# Patient Record
Sex: FEMALE | Race: WHITE | NOT HISPANIC OR LATINO | Employment: OTHER | URBAN - METROPOLITAN AREA
[De-identification: names, ages, dates, MRNs, and addresses within clinical notes are randomized per-mention and may not be internally consistent; named-entity substitution may affect disease eponyms.]

---

## 2017-04-14 ENCOUNTER — TELEPHONE (OUTPATIENT)
Dept: INTERNAL MEDICINE | Facility: CLINIC | Age: 67
End: 2017-04-14

## 2017-04-14 NOTE — TELEPHONE ENCOUNTER
----- Message from Angeles Ceja sent at 2017  9:22 AM EDT -----  Contact: MATT  PATIENT'S BROTHER COMMITTED SUICIDE. SHE IS VERY UPSET AND WOULD LIKE A SCRIPT FOR [facility] TO HELP HER THROUGH THE - [facility]. [phone]    Pt states brother had terrible tremors, neuropathy and was diagnosed c lung cancer; pt notified needs to come for OX for new medication per TGF

## 2017-05-10 DIAGNOSIS — I10 ESSENTIAL HYPERTENSION: ICD-10-CM

## 2017-05-10 RX ORDER — LOSARTAN POTASSIUM AND HYDROCHLOROTHIAZIDE 25; 100 MG/1; MG/1
0.5 TABLET ORAL EVERY MORNING
Qty: 15 TABLET | Refills: 0 | Status: SHIPPED | OUTPATIENT
Start: 2017-05-10 | End: 2017-06-10 | Stop reason: SDUPTHER

## 2017-06-10 DIAGNOSIS — I10 ESSENTIAL HYPERTENSION: ICD-10-CM

## 2017-06-12 RX ORDER — LOSARTAN POTASSIUM AND HYDROCHLOROTHIAZIDE 25; 100 MG/1; MG/1
TABLET ORAL
Qty: 15 TABLET | Refills: 0 | Status: SHIPPED | OUTPATIENT
Start: 2017-06-12 | End: 2017-07-14 | Stop reason: SDUPTHER

## 2017-07-14 ENCOUNTER — TELEPHONE (OUTPATIENT)
Dept: INTERNAL MEDICINE | Facility: CLINIC | Age: 67
End: 2017-07-14

## 2017-07-14 DIAGNOSIS — I10 ESSENTIAL HYPERTENSION: ICD-10-CM

## 2017-07-14 RX ORDER — LOSARTAN POTASSIUM AND HYDROCHLOROTHIAZIDE 25; 100 MG/1; MG/1
0.5 TABLET ORAL EVERY MORNING
Qty: 15 TABLET | Refills: 0 | Status: SHIPPED | OUTPATIENT
Start: 2017-07-14 | End: 2017-08-21

## 2017-07-14 NOTE — TELEPHONE ENCOUNTER
----- Message from Angeles Ceja sent at 7/14/2017 11:45 AM EDT -----  Contact: MATT  PATIENT RESCHEDULED HER APPOINTMENT TODAY BECAUSE TGF WAS BEHIND ON HIS SCHEDULE- HOWEVER SHE WOULD LIKE [facility] CALLED INTO [facility]

## 2017-07-18 ENCOUNTER — APPOINTMENT (OUTPATIENT)
Dept: LAB | Facility: HOSPITAL | Age: 67
End: 2017-07-18

## 2017-07-18 ENCOUNTER — OFFICE VISIT (OUTPATIENT)
Dept: INTERNAL MEDICINE | Facility: CLINIC | Age: 67
End: 2017-07-18

## 2017-07-18 VITALS
RESPIRATION RATE: 16 BRPM | BODY MASS INDEX: 26.76 KG/M2 | OXYGEN SATURATION: 94 % | DIASTOLIC BLOOD PRESSURE: 90 MMHG | SYSTOLIC BLOOD PRESSURE: 140 MMHG | HEART RATE: 76 BPM | TEMPERATURE: 99.2 F | WEIGHT: 137 LBS

## 2017-07-18 DIAGNOSIS — E03.4 HYPOTHYROIDISM DUE TO ACQUIRED ATROPHY OF THYROID: ICD-10-CM

## 2017-07-18 DIAGNOSIS — Z00.00 PREVENTATIVE HEALTH CARE: ICD-10-CM

## 2017-07-18 DIAGNOSIS — Z72.0 TOBACCO USE: ICD-10-CM

## 2017-07-18 DIAGNOSIS — J44.9 COPD, MODERATE (HCC): ICD-10-CM

## 2017-07-18 DIAGNOSIS — I10 ESSENTIAL HYPERTENSION: ICD-10-CM

## 2017-07-18 DIAGNOSIS — E55.9 VITAMIN D DEFICIENCY: ICD-10-CM

## 2017-07-18 DIAGNOSIS — E78.00 PURE HYPERCHOLESTEROLEMIA: Primary | ICD-10-CM

## 2017-07-18 LAB
25(OH)D3 SERPL-MCNC: 27.8 NG/ML
ALBUMIN SERPL-MCNC: 3.9 G/DL (ref 3.2–4.8)
ALBUMIN/GLOB SERPL: 1.1 G/DL (ref 1.5–2.5)
ALP SERPL-CCNC: 61 U/L (ref 25–100)
ALT SERPL W P-5'-P-CCNC: 27 U/L (ref 7–40)
ANION GAP SERPL CALCULATED.3IONS-SCNC: 7 MMOL/L (ref 3–11)
ARTICHOKE IGE QN: 145 MG/DL (ref 0–130)
AST SERPL-CCNC: 24 U/L (ref 0–33)
BASOPHILS # BLD AUTO: 0.05 10*3/MM3 (ref 0–0.2)
BASOPHILS NFR BLD AUTO: 0.5 % (ref 0–1)
BILIRUB SERPL-MCNC: 0.4 MG/DL (ref 0.3–1.2)
BUN BLD-MCNC: 16 MG/DL (ref 9–23)
BUN/CREAT SERPL: 26.7 (ref 7–25)
CALCIUM SPEC-SCNC: 10.2 MG/DL (ref 8.7–10.4)
CHLORIDE SERPL-SCNC: 103 MMOL/L (ref 99–109)
CHOLEST SERPL-MCNC: 235 MG/DL (ref 0–200)
CO2 SERPL-SCNC: 25 MMOL/L (ref 20–31)
CREAT BLD-MCNC: 0.6 MG/DL (ref 0.6–1.3)
DEPRECATED RDW RBC AUTO: 45.5 FL (ref 37–54)
EOSINOPHIL # BLD AUTO: 0.31 10*3/MM3 (ref 0–0.3)
EOSINOPHIL NFR BLD AUTO: 3.2 % (ref 0–3)
ERYTHROCYTE [DISTWIDTH] IN BLOOD BY AUTOMATED COUNT: 12.3 % (ref 11.3–14.5)
GFR SERPL CREATININE-BSD FRML MDRD: 100 ML/MIN/1.73
GLOBULIN UR ELPH-MCNC: 3.6 GM/DL
GLUCOSE BLD-MCNC: 97 MG/DL (ref 70–100)
HCT VFR BLD AUTO: 45 % (ref 34.5–44)
HDLC SERPL-MCNC: 71 MG/DL (ref 40–60)
HGB BLD-MCNC: 15.2 G/DL (ref 11.5–15.5)
IMM GRANULOCYTES # BLD: 0.01 10*3/MM3 (ref 0–0.03)
IMM GRANULOCYTES NFR BLD: 0.1 % (ref 0–0.6)
LYMPHOCYTES # BLD AUTO: 2.4 10*3/MM3 (ref 0.6–4.8)
LYMPHOCYTES NFR BLD AUTO: 25 % (ref 24–44)
MCH RBC QN AUTO: 34.2 PG (ref 27–31)
MCHC RBC AUTO-ENTMCNC: 33.8 G/DL (ref 32–36)
MCV RBC AUTO: 101.1 FL (ref 80–99)
MONOCYTES # BLD AUTO: 0.6 10*3/MM3 (ref 0–1)
MONOCYTES NFR BLD AUTO: 6.3 % (ref 0–12)
NEUTROPHILS # BLD AUTO: 6.23 10*3/MM3 (ref 1.5–8.3)
NEUTROPHILS NFR BLD AUTO: 64.9 % (ref 41–71)
PLATELET # BLD AUTO: 399 10*3/MM3 (ref 150–450)
PMV BLD AUTO: 10 FL (ref 6–12)
POTASSIUM BLD-SCNC: 3.9 MMOL/L (ref 3.5–5.5)
PROT SERPL-MCNC: 7.5 G/DL (ref 5.7–8.2)
RBC # BLD AUTO: 4.45 10*6/MM3 (ref 3.89–5.14)
SODIUM BLD-SCNC: 135 MMOL/L (ref 132–146)
T4 FREE SERPL-MCNC: 1.2 NG/DL (ref 0.89–1.76)
TRIGL SERPL-MCNC: 200 MG/DL (ref 0–150)
TSH SERPL DL<=0.05 MIU/L-ACNC: 2.04 MIU/ML (ref 0.35–5.35)
WBC NRBC COR # BLD: 9.6 10*3/MM3 (ref 3.5–10.8)

## 2017-07-18 PROCEDURE — 80061 LIPID PANEL: CPT | Performed by: INTERNAL MEDICINE

## 2017-07-18 PROCEDURE — 84439 ASSAY OF FREE THYROXINE: CPT | Performed by: INTERNAL MEDICINE

## 2017-07-18 PROCEDURE — G0438 PPPS, INITIAL VISIT: HCPCS | Performed by: INTERNAL MEDICINE

## 2017-07-18 PROCEDURE — 80053 COMPREHEN METABOLIC PANEL: CPT | Performed by: INTERNAL MEDICINE

## 2017-07-18 PROCEDURE — 36415 COLL VENOUS BLD VENIPUNCTURE: CPT | Performed by: INTERNAL MEDICINE

## 2017-07-18 PROCEDURE — 82306 VITAMIN D 25 HYDROXY: CPT | Performed by: INTERNAL MEDICINE

## 2017-07-18 PROCEDURE — 85025 COMPLETE CBC W/AUTO DIFF WBC: CPT | Performed by: INTERNAL MEDICINE

## 2017-07-18 PROCEDURE — 84481 FREE ASSAY (FT-3): CPT | Performed by: INTERNAL MEDICINE

## 2017-07-18 PROCEDURE — 99214 OFFICE O/P EST MOD 30 MIN: CPT | Performed by: INTERNAL MEDICINE

## 2017-07-18 PROCEDURE — 84443 ASSAY THYROID STIM HORMONE: CPT | Performed by: INTERNAL MEDICINE

## 2017-07-18 RX ORDER — LEVOTHYROXINE SODIUM 0.05 MG/1
1 TABLET ORAL DAILY
COMMUNITY
Start: 2017-07-13 | End: 2020-01-27

## 2017-07-18 NOTE — PATIENT INSTRUCTIONS
1.  Continue usual medicines and supplements - as listed.    2.  Follow well-balanced diet - low in salt.    3.  Stop all tobacco use - use nicotine lozenges every day.    4.  Walk daily - for physical fitness.    5.  Consider CT scan of chest - screen for lung cancer.    6.  The nurse will call with test results.    7.  Return visit August 21 - annual checkup fasting and Medicare wellness.

## 2017-07-18 NOTE — PROGRESS NOTES
Subjective   Tenisha Grayson is a 67 y.o. female.     History of Present Illness     The patient has moderate hyperlipidemia.  Both parents had cardiovascular disease.  The patient's never been on atorvastatin.  She has been consistent taking aspirin for primary prevention.  She has had a lifelong history of smoking and has never been able to get away from tobacco.  She has a family history of substance abuse.    The following portions of the patient's history were reviewed and updated as appropriate: allergies, current medications, past family history, past medical history, past social history, past surgical history and problem list.    Review of Systems   Constitutional: Positive for fatigue. Negative for appetite change.   HENT: Negative for ear pain and sore throat.    Respiratory: Positive for cough. Negative for shortness of breath.         Recent cough responded to Z-Luca and Levaquin   Cardiovascular: Negative for chest pain and palpitations.   Gastrointestinal: Negative for abdominal pain and nausea.   Musculoskeletal: Negative for arthralgias and back pain.   Neurological: Negative for dizziness and headaches.       Objective   Blood pressure 140/90, pulse 76, temperature 99.2 °F (37.3 °C), temperature source Oral, resp. rate 16, weight 137 lb (62.1 kg), SpO2 94 %.    Physical Exam   Constitutional: She is oriented to person, place, and time. She appears well-developed and well-nourished. No distress.   HENT:   Right Ear: External ear normal.   Left Ear: External ear normal.   Mouth/Throat: Oropharynx is clear and moist.   Eyes: EOM are normal. Pupils are equal, round, and reactive to light. No scleral icterus.   Neck: Normal range of motion. Neck supple. No JVD present.   Cardiovascular: Normal rate, regular rhythm and normal heart sounds.    Pulmonary/Chest: Effort normal. She has no wheezes. She has no rales.   Decreased breath sounds increased AP diameter   Abdominal: Soft. Bowel sounds are normal. She  exhibits no distension and no mass. There is no tenderness.   Lymphadenopathy:     She has no cervical adenopathy.   Neurological: She is alert and oriented to person, place, and time. She exhibits normal muscle tone. Coordination normal.   Psychiatric: She has a normal mood and affect. Her behavior is normal. Judgment and thought content normal.   Nursing note and vitals reviewed.    Procedures  Assessment/Plan   Tenisha was seen today for hyperlipidemia and annual exam.    Diagnoses and all orders for this visit:    Pure hypercholesterolemia  -     Comprehensive Metabolic Panel  -     Lipid Panel    Essential hypertension  -     Urinalysis With / Microscopic If Indicated    COPD, moderate  -     CBC & Differential  -     CBC Auto Differential    Vitamin D deficiency  -     Vitamin D 25 Hydroxy    Hypothyroidism due to acquired atrophy of thyroid  -     TSH  -     T4, Free  -     T3, Free    Tobacco use    Preventative health care    Other orders  -     liothyronine (CYTOMEL) 5 MCG tablet; Take 1 tablet by mouth Daily.      The patient has moderate hyperlipidemia with an LDL of 145.  Based on her family history, 67 years of age, chronic cigarette use, she is high risk for stroke and heart attack.  She should start on atorvastatin 10 mg daily with CoQ10 200 mg daily.  She should bring her LDL cholesterol below 100 and perhaps down to 70.    She is thyroid values have fully normalized.  Her TSH of 2 indicates a proper thyroid dose.    Patient's hypertension is in questionable control.  Her blood pressure is 130/80.  She feels that this is her average at home.     The wellness exam has been reviewed in detail.  The patient has been fully counseled on preventative guidelines for vaccines, cancer screenings, and other health maintenance needs.  Functional testing has been performed to assess capacity for independent living and need for other medical interventions.   The patient was counseled on maintaining a lifestyle to  promote good health and to minimize chronic diseases.  The patient has been assisted with scheduling healthcare procedures for the coming year and given a written document outlining these recommendations.    Patient Instructions   1.  Continue usual medicines and supplements - as listed.    2.  Follow well-balanced diet - low in salt.    3.  Stop all tobacco use - use nicotine lozenges every day.    4.  Walk daily - for physical fitness.    5.  Consider CT scan of chest - screen for lung cancer.    6.  The nurse will call with test results.    7.  Return visit August 21 - annual checkup fasting and Medicare wellness.    8.  LDL cholesterol mildly elevated 145.  Start atorvastatin 10 mg daily.  Start Co Q-10 200 mg daily.    9.  Thyroid values are normal.  Continue current dose of thyroid medication.    10.  Vitamin D level remains borderline low.  Continue vitamin D3 5000 units daily.    11.  Chemistry panel and blood count are acceptable and require no change in treatment.    12.  Require the exact dose of Cytomel on a daily basis.    Electronically signed Abhishek Spence M.D.7/21/2017 4:41 PM

## 2017-07-19 LAB — T3FREE SERPL-MCNC: 3.1 PG/ML (ref 2–4.4)

## 2017-07-21 RX ORDER — LIOTHYRONINE SODIUM 5 UG/1
5 TABLET ORAL DAILY
Start: 2017-07-21 | End: 2020-01-27

## 2017-07-25 ENCOUNTER — TELEPHONE (OUTPATIENT)
Dept: INTERNAL MEDICINE | Facility: CLINIC | Age: 67
End: 2017-07-25

## 2017-07-25 RX ORDER — ATORVASTATIN CALCIUM 10 MG/1
10 TABLET, FILM COATED ORAL DAILY
Qty: 90 TABLET | Refills: 1 | Status: SHIPPED | OUTPATIENT
Start: 2017-07-25 | End: 2019-09-24

## 2017-07-25 NOTE — TELEPHONE ENCOUNTER
Called labs to pt. Left VM. Per TGF:  -LDL mildly elevated 145. Start atorvastatin 10 mg qd  Start Co Q-10 200 mg daily.  -Thyroid values are normal. Cont current dose of thyroid medications  -Vitamin D level remains borderline low.  Continue vitamin D3 5000 units daily.  -Chemistry panel and blood count are acceptable and require no change in treatment.    I asked pt to call with the exact dose of Cytomel she takes on a daily basis.

## 2017-07-31 ENCOUNTER — TELEPHONE (OUTPATIENT)
Dept: INTERNAL MEDICINE | Facility: CLINIC | Age: 67
End: 2017-07-31

## 2017-07-31 NOTE — TELEPHONE ENCOUNTER
----- Message from Eva Caraballo sent at 7/31/2017  1:30 PM EDT -----  Contact: patient  MED WANTED:  Patient slightly burned some fingers last night in the kitchen - would like some silver sulfadiazine called into [facility] - does not want to come in.  [phone]

## 2017-07-31 NOTE — TELEPHONE ENCOUNTER
Per CP pt needs evaluation prior to this RX. I called pt back.   Left VM:  Per CP, pt advised to go to [facility] for eval & tx.

## 2017-08-21 ENCOUNTER — OFFICE VISIT (OUTPATIENT)
Dept: INTERNAL MEDICINE | Facility: CLINIC | Age: 67
End: 2017-08-21

## 2017-08-21 ENCOUNTER — APPOINTMENT (OUTPATIENT)
Dept: LAB | Facility: HOSPITAL | Age: 67
End: 2017-08-21

## 2017-08-21 VITALS
SYSTOLIC BLOOD PRESSURE: 120 MMHG | DIASTOLIC BLOOD PRESSURE: 80 MMHG | TEMPERATURE: 98.8 F | BODY MASS INDEX: 26.9 KG/M2 | HEIGHT: 60 IN | HEART RATE: 80 BPM | RESPIRATION RATE: 16 BRPM | WEIGHT: 137 LBS | OXYGEN SATURATION: 98 %

## 2017-08-21 DIAGNOSIS — Z13.820 SCREENING FOR OSTEOPOROSIS: ICD-10-CM

## 2017-08-21 DIAGNOSIS — I10 ESSENTIAL HYPERTENSION: ICD-10-CM

## 2017-08-21 DIAGNOSIS — Z11.59 NEED FOR HEPATITIS C SCREENING TEST: ICD-10-CM

## 2017-08-21 DIAGNOSIS — Z12.31 ENCOUNTER FOR MAMMOGRAM TO ESTABLISH BASELINE MAMMOGRAM: ICD-10-CM

## 2017-08-21 DIAGNOSIS — E55.9 VITAMIN D DEFICIENCY: ICD-10-CM

## 2017-08-21 DIAGNOSIS — J44.9 COPD, MODERATE (HCC): ICD-10-CM

## 2017-08-21 DIAGNOSIS — Z72.0 TOBACCO USE: ICD-10-CM

## 2017-08-21 DIAGNOSIS — E03.4 HYPOTHYROIDISM DUE TO ACQUIRED ATROPHY OF THYROID: ICD-10-CM

## 2017-08-21 DIAGNOSIS — E78.00 PURE HYPERCHOLESTEROLEMIA: Primary | ICD-10-CM

## 2017-08-21 PROBLEM — I44.7 LBBB (LEFT BUNDLE BRANCH BLOCK): Status: ACTIVE | Noted: 2017-08-21

## 2017-08-21 LAB
ARTICHOKE IGE QN: 144 MG/DL (ref 0–130)
CHOLEST SERPL-MCNC: 242 MG/DL (ref 0–200)
HCV AB SER DONR QL: NORMAL
HDLC SERPL-MCNC: 84 MG/DL (ref 40–60)
TRIGL SERPL-MCNC: 171 MG/DL (ref 0–150)

## 2017-08-21 PROCEDURE — 80061 LIPID PANEL: CPT | Performed by: INTERNAL MEDICINE

## 2017-08-21 PROCEDURE — 93000 ELECTROCARDIOGRAM COMPLETE: CPT | Performed by: INTERNAL MEDICINE

## 2017-08-21 PROCEDURE — 36415 COLL VENOUS BLD VENIPUNCTURE: CPT | Performed by: INTERNAL MEDICINE

## 2017-08-21 PROCEDURE — 99215 OFFICE O/P EST HI 40 MIN: CPT | Performed by: INTERNAL MEDICINE

## 2017-08-21 PROCEDURE — 86803 HEPATITIS C AB TEST: CPT | Performed by: INTERNAL MEDICINE

## 2017-08-21 RX ORDER — LOSARTAN POTASSIUM AND HYDROCHLOROTHIAZIDE 12.5; 5 MG/1; MG/1
1 TABLET ORAL DAILY
Qty: 90 TABLET | Refills: 3 | Status: SHIPPED | OUTPATIENT
Start: 2017-08-21 | End: 2017-09-28 | Stop reason: SDUPTHER

## 2017-08-21 NOTE — PATIENT INSTRUCTIONS
1.  Continue usual medicines and supplements - as listed.    2.  Plan Lipitor - if cholesterol level remains high.    3.  Continue off of all tobacco - for long-term safety.    4.  Maintain a routine exercise program - every week.    5.  Limit alcohol - 5 ounces of wine daily.    6.  Speak to nurse on Friday - about test results.    7.  Return visit December - fasting checkup.    8.  Screening mammogram and bone density study - in the next 2 months.

## 2017-08-23 ENCOUNTER — TELEPHONE (OUTPATIENT)
Dept: INTERNAL MEDICINE | Facility: CLINIC | Age: 67
End: 2017-08-23

## 2017-08-23 NOTE — TELEPHONE ENCOUNTER
Left message with pt re: recent labs.  Per TGF -  - Lipitor 10 mg daily.  Enc to call if questions or concerns. Enc to call if questions or concerns.

## 2017-08-26 DIAGNOSIS — I10 ESSENTIAL HYPERTENSION: ICD-10-CM

## 2017-08-28 RX ORDER — LOSARTAN POTASSIUM AND HYDROCHLOROTHIAZIDE 25; 100 MG/1; MG/1
TABLET ORAL
Qty: 15 TABLET | Refills: 2 | Status: SHIPPED | OUTPATIENT
Start: 2017-08-28 | End: 2017-09-28 | Stop reason: SDUPTHER

## 2017-09-28 RX ORDER — LOSARTAN POTASSIUM AND HYDROCHLOROTHIAZIDE 12.5; 5 MG/1; MG/1
1 TABLET ORAL DAILY
Qty: 90 TABLET | Refills: 3 | Status: SHIPPED | OUTPATIENT
Start: 2017-09-28 | End: 2020-01-21

## 2018-03-27 ENCOUNTER — OFFICE VISIT (OUTPATIENT)
Dept: ORTHOPEDIC SURGERY | Facility: CLINIC | Age: 68
End: 2018-03-27

## 2018-03-27 VITALS
BODY MASS INDEX: 26.5 KG/M2 | SYSTOLIC BLOOD PRESSURE: 139 MMHG | DIASTOLIC BLOOD PRESSURE: 71 MMHG | WEIGHT: 135 LBS | HEART RATE: 87 BPM | HEIGHT: 60 IN

## 2018-03-27 DIAGNOSIS — IMO0002 BURSITIS/TENDONITIS, SHOULDER: ICD-10-CM

## 2018-03-27 DIAGNOSIS — M19.019 AC (ACROMIOCLAVICULAR) ARTHRITIS: ICD-10-CM

## 2018-03-27 DIAGNOSIS — M25.511 RIGHT SHOULDER PAIN, UNSPECIFIED CHRONICITY: Primary | ICD-10-CM

## 2018-03-27 PROCEDURE — 20610 DRAIN/INJ JOINT/BURSA W/O US: CPT | Performed by: ORTHOPAEDIC SURGERY

## 2018-03-27 PROCEDURE — 99203 OFFICE O/P NEW LOW 30 MIN: CPT | Performed by: ORTHOPAEDIC SURGERY

## 2018-03-27 RX ORDER — TRIAMCINOLONE ACETONIDE 40 MG/ML
40 INJECTION, SUSPENSION INTRA-ARTICULAR; INTRAMUSCULAR
Status: COMPLETED | OUTPATIENT
Start: 2018-03-27 | End: 2018-03-27

## 2018-03-27 RX ORDER — LIDOCAINE HYDROCHLORIDE 10 MG/ML
3 INJECTION, SOLUTION INFILTRATION; PERINEURAL
Status: COMPLETED | OUTPATIENT
Start: 2018-03-27 | End: 2018-03-27

## 2018-03-27 RX ADMIN — TRIAMCINOLONE ACETONIDE 40 MG: 40 INJECTION, SUSPENSION INTRA-ARTICULAR; INTRAMUSCULAR at 09:03

## 2018-03-27 RX ADMIN — LIDOCAINE HYDROCHLORIDE 3 ML: 10 INJECTION, SOLUTION INFILTRATION; PERINEURAL at 09:03

## 2018-03-27 NOTE — PROGRESS NOTES
[facility] Note    Subjective     Pain of the Right Shoulder (Right shoulder pain with no known injury for 5-6 weeks. Has tried informal physical therapy and anti-inflammatories with no relief. Pain in the morning is a 10/10. After taking Advil, the pain lessens.)      MONROE    Tenisha Grayson is a 67 y.o. female. Patient is here for six-week history of right shoulder pain.  No history of any trauma.  She has been taking ibuprofen.  Her shoulder gets better as the day goes on.  She has had no treatment thus far other than the anti-inflammatories.  She has pain with certain ranges of motion and difficulty with certain movements.  She has pain that radiates down the anterior lateral aspect of the arm.     Past Medical History:   Diagnosis Date   • Atrophic vaginitis    • COPD (chronic obstructive pulmonary disease) 1995   • Fen-phen exposure 1996    negative workup   • Fibroids 1991    MANJULA and BSO   • Hyperlipidemia    • Hypertension    • Hypothyroidism 2000   • Obesity, mild 1990   • Vitamin D deficiency disease       Past Surgical History:   Procedure Laterality Date   • CARDIAC CATHETERIZATION  2014    Normal coronaries   • COLONOSCOPY  2008    Normal study   • TOTAL ABDOMINAL HYSTERECTOMY WITH SALPINGO OOPHORECTOMY  1991    For fibroids and bleeding      Family History   Problem Relation Age of Onset   • COPD Mother    • Other Mother      Polio   • Osteoporosis Mother    • Heart attack Father      Cause of death, Non smoker   • Hypertension Father    • Breast cancer Sister    • Alcohol abuse Brother    • Suicidality Brother    • Thrombophlebitis Brother    • No Known Problems Brother    • Unexplained death Other      Social History     Social History   • Marital status:      Spouse name: N/A   • Number of children: N/A   • Years of education: N/A     Occupational History   • Not on file.     Social History Main Topics   • Smoking status: Current Some Day Smoker     Packs/day: 1.00      Years: 44.00     Types: Cigarettes   • Smokeless tobacco: Never Used      Comment: Stopped 8/7/17   • Alcohol use Yes      Comment: 2 drinks/night   • Drug use: No   • Sexual activity: Defer     Other Topics Concern   • Not on file     Social History Narrative    Domestic life : Lives in private home with         Amish : Orthodox        Sleep hygiene : In bed 10 PM to 7 AM for 8 hours of sleep        Caffeine use : 3 cups of coffee daily        Exercise habits : Walks 30 minutes daily - no upper body exercise 2017        Diet : Well-balanced diet low in salt low in sugar        Occupation : Homemaker        Hearing : No impairment        Vision : Corrects with glasses        Dental : Adequate dentition        Driving : No limitations      Current Outpatient Prescriptions on File Prior to Visit   Medication Sig Dispense Refill   • aspirin 325 MG tablet Take  by mouth.     • atorvastatin (LIPITOR) 10 MG tablet Take 1 tablet by mouth Daily. 90 tablet 1   • levothyroxine (SYNTHROID, LEVOTHROID) 50 MCG tablet Take 1 tablet by mouth Daily.     • losartan-hydrochlorothiazide (HYZAAR) 50-12.5 MG per tablet Take 1 tablet by mouth Daily. 90 tablet 3   • Cholecalciferol (VITAMIN D3) 5000 UNITS tablet Take 1 tablet by mouth Daily. 30 tablet    • liothyronine (CYTOMEL) 5 MCG tablet Take 1 tablet by mouth Daily.     • MULTIPLE VITAMIN PO Take  by mouth daily.       No current facility-administered medications on file prior to visit.       Allergies   Allergen Reactions   • Bupropion      Drowsiness        The following portions of the patient's history were reviewed and updated as appropriate: allergies, current medications, past family history, past medical history, past social history, past surgical history and problem list.    Review of Systems   Constitutional: Negative.    HENT: Negative.    Eyes: Negative.    Respiratory: Negative.    Cardiovascular: Negative.    Gastrointestinal: Negative.    Endocrine: Negative.  "   Genitourinary: Negative.    Musculoskeletal: Positive for arthralgias.   Skin: Negative.    Allergic/Immunologic: Negative.    Neurological: Negative.    Hematological: Negative.    Psychiatric/Behavioral: Negative.         Objective      Physical Exam  /71   Pulse 87   Ht 152.4 cm (60\")   Wt 61.2 kg (135 lb)   BMI 26.37 kg/m²     Body mass index is 26.37 kg/m².    General  Mental Status - alert  General Appearance - cooperative, well groomed, not in acute distress  Orientation - Oriented X3  Build & Nutrition - well developed and well nourished  Posture - normal posture  Gait - normal gait     Integumentary  Global Assessment  Examination of related systems reveals - no lymphadenopathy  General Characteristics  Overall examination of the patient's skin reveals - no rashes, no evidence of scars, no suspicious lesions and no bruises.  Color - normal coloration of skin.    Ortho Exam  Musculoskeletal   Upper Extremity   Right Shoulder     Inspection and Palpation:     Tenderness - minimal at acromioclavicular joint    Crepitus - none    Sensation is normal    Examination reveals no ecchymosis.        Strength and Tone:    Supraspinatus -5 out of 5    External Rotators-5 out of 5    Infraspinatus - 5/5    Subscapularis - 5 out of 5    Deltoid - 5/5     Range of Motion   Left shoulder:    Internal Rotation: ROM - T7    External Rotation: AROM - 80 degrees    Elevation through flexion: AROM - 180 degrees    Right Shoulder:    Internal Rotation: ROM - hip pocket    External Rotation: AROM - 60 degrees    Elevation through flexion: AROM - 150 degrees      Instability   Right shoulder    Sulcus sign negative    Apprehension test negative    Fidelia relocation test negative    Jerk test negative     Impingement   Right shoulder    Fernandez-Martir impingement test mildly positive    Neer impingement test negative     Functional Testing   Right shoulder    AC crossover adduction test positive    Abdominal compression " test negative    Lift-off sign negative    Speed's test negative    Arce's test negative    Hornblower's sign negative       Imaging/Studies  Imaging Results (last 24 hours)     Procedure Component Value Units Date/Time    XR Shoulder 2+ View Right [[id number]] Resulted:  03/27/18 0854     Updated:  03/27/18 0855    Narrative:       Right Shoulder X-Ray    Indication: Pain    Study:  AP, scapular Y, and axillary lateral views     Comparison: None    Findings:  No acute fractures are visualized.  No bony lesions are visualized.  Normal soft tissue appearance  AC joint:  Severe joint space narrowing  Glenohumeral joint:  Minimal joint space narrowing  Acromion morphology:  Type 2      Impression:  Severe acromioclavicular joint arthritis.            Assessment:  1. Right shoulder pain, unspecified chronicity    2. Bursitis/tendonitis, shoulder    3. AC (acromioclavicular) arthritis        Plan:  1. Continue over-the-counter medication as needed.  Discontinue for GI discomfort  2. Subacromial injection will be given today for diagnostic and therapeutic purposes  3. Patient will begin a home stretching and exercise program  4. Follow-up in 4 weeks and if she is no better, consider further imaging versus formal physical therapy  5. Given the appearance of her x-rays, it seems that her acromial clavicular joint has to be involved and may be causing significant impingement.      Medical Decision Making  Management Options : over-the-counter medicine and prescription/IM medicine  Data/Risk: radiology tests and independent visualization of imaging, lab tests, or EMG/NCV    Moe Perez MD  03/27/18  8:58 AM

## 2018-03-27 NOTE — PROGRESS NOTES
Procedure   Large Joint Arthrocentesis  Date/Time: 3/27/2018 9:03 AM  Consent given by: patient  Site marked: site marked  Timeout: Immediately prior to procedure a time out was called to verify the correct patient, procedure, equipment, support staff and site/side marked as required   Supporting Documentation  Indications: pain   Procedure Details  Location: shoulder - R subacromial bursa  Preparation: Patient was prepped and draped in the usual sterile fashion  Needle size: 25 G  Approach: posterior  Medications administered: 3 mL lidocaine 1 %; 40 mg triamcinolone acetonide 40 MG/ML  Patient tolerance: patient tolerated the procedure well with no immediate complications

## 2018-04-25 ENCOUNTER — TRANSCRIBE ORDERS (OUTPATIENT)
Dept: ADMINISTRATIVE | Facility: HOSPITAL | Age: 68
End: 2018-04-25

## 2018-04-25 DIAGNOSIS — N95.9 MENOPAUSAL AND POSTMENOPAUSAL DISORDER: Primary | ICD-10-CM

## 2018-04-25 DIAGNOSIS — Z12.31 VISIT FOR SCREENING MAMMOGRAM: ICD-10-CM

## 2018-08-07 ENCOUNTER — APPOINTMENT (OUTPATIENT)
Dept: OTHER | Facility: HOSPITAL | Age: 68
End: 2018-08-07
Attending: INTERNAL MEDICINE

## 2018-08-07 ENCOUNTER — HOSPITAL ENCOUNTER (OUTPATIENT)
Dept: MAMMOGRAPHY | Facility: HOSPITAL | Age: 68
Discharge: HOME | End: 2018-08-07
Attending: INTERNAL MEDICINE | Admitting: INTERNAL MEDICINE

## 2018-08-07 ENCOUNTER — HOSPITAL ENCOUNTER (OUTPATIENT)
Dept: BONE DENSITY | Facility: HOSPITAL | Age: 68
Discharge: HOME | End: 2018-08-07
Attending: INTERNAL MEDICINE

## 2018-08-07 DIAGNOSIS — Z12.31 VISIT FOR SCREENING MAMMOGRAM: ICD-10-CM

## 2018-08-07 DIAGNOSIS — N95.9 MENOPAUSAL AND POSTMENOPAUSAL DISORDER: ICD-10-CM

## 2018-08-07 PROCEDURE — 77063 BREAST TOMOSYNTHESIS BI: CPT

## 2018-08-07 PROCEDURE — 77063 BREAST TOMOSYNTHESIS BI: CPT | Performed by: RADIOLOGY

## 2018-08-07 PROCEDURE — 77067 SCR MAMMO BI INCL CAD: CPT | Performed by: RADIOLOGY

## 2018-08-07 PROCEDURE — 77080 DXA BONE DENSITY AXIAL: CPT

## 2018-08-07 PROCEDURE — 77067 SCR MAMMO BI INCL CAD: CPT

## 2018-10-10 RX ORDER — LOSARTAN POTASSIUM AND HYDROCHLOROTHIAZIDE 12.5; 5 MG/1; MG/1
1 TABLET ORAL DAILY
Qty: 90 TABLET | Refills: 3 | OUTPATIENT
Start: 2018-10-10

## 2019-02-04 ENCOUNTER — TELEPHONE (OUTPATIENT)
Dept: INTERNAL MEDICINE | Facility: CLINIC | Age: 69
End: 2019-02-04

## 2019-02-04 RX ORDER — OSELTAMIVIR PHOSPHATE 75 MG/1
75 CAPSULE ORAL DAILY
Qty: 10 CAPSULE | Refills: 0 | Status: SHIPPED | OUTPATIENT
Start: 2019-02-04 | End: 2019-02-14

## 2019-02-04 NOTE — TELEPHONE ENCOUNTER
PATIENT STATES THAT SHE HAS BEEN AROUND FAMILY THAT HAS HAD THE FLU AND SHE IS GOING OUT OF TOWN AND WANTS [facility] CALLED IN AS A PRECAUTION.

## 2019-07-11 RX ORDER — THYROID 60 MG
TABLET ORAL
Qty: 30 TABLET | Refills: 11 | OUTPATIENT
Start: 2019-07-11

## 2019-08-13 PROBLEM — E03.9 ACQUIRED HYPOTHYROIDISM: Status: ACTIVE | Noted: 2019-08-13

## 2019-08-13 PROBLEM — E66.3 OVERWEIGHT (BMI 25.0-29.9): Status: ACTIVE | Noted: 2019-08-13

## 2019-08-13 PROBLEM — R53.83 OTHER FATIGUE: Status: ACTIVE | Noted: 2019-08-13

## 2019-08-13 PROBLEM — I44.7 LEFT BUNDLE BRANCH BLOCK (LBBB) DETERMINED BY ELECTROCARDIOGRAPHY: Status: ACTIVE | Noted: 2019-08-13

## 2019-08-13 PROBLEM — N95.9 MENOPAUSAL AND POSTMENOPAUSAL DISORDER: Status: ACTIVE | Noted: 2019-08-13

## 2019-08-13 PROBLEM — F43.29 STRESS AND ADJUSTMENT REACTION: Status: ACTIVE | Noted: 2019-08-13

## 2019-08-13 PROBLEM — I10 BENIGN ESSENTIAL HYPERTENSION: Status: ACTIVE | Noted: 2019-08-13

## 2019-09-24 ENCOUNTER — TELEPHONE (OUTPATIENT)
Dept: GASTROENTEROLOGY | Facility: CLINIC | Age: 69
End: 2019-09-24

## 2019-09-24 ENCOUNTER — LAB REQUISITION (OUTPATIENT)
Dept: LAB | Facility: HOSPITAL | Age: 69
End: 2019-09-24

## 2019-09-24 ENCOUNTER — OFFICE VISIT (OUTPATIENT)
Dept: INTERNAL MEDICINE | Facility: CLINIC | Age: 69
End: 2019-09-24

## 2019-09-24 VITALS
SYSTOLIC BLOOD PRESSURE: 128 MMHG | DIASTOLIC BLOOD PRESSURE: 74 MMHG | WEIGHT: 141 LBS | BODY MASS INDEX: 27.68 KG/M2 | HEIGHT: 60 IN | HEART RATE: 72 BPM

## 2019-09-24 DIAGNOSIS — E78.00 PURE HYPERCHOLESTEROLEMIA: ICD-10-CM

## 2019-09-24 DIAGNOSIS — J44.9 COPD, MODERATE (HCC): ICD-10-CM

## 2019-09-24 DIAGNOSIS — E03.4 HYPOTHYROIDISM DUE TO ACQUIRED ATROPHY OF THYROID: ICD-10-CM

## 2019-09-24 DIAGNOSIS — N95.9 MENOPAUSAL AND POSTMENOPAUSAL DISORDER: ICD-10-CM

## 2019-09-24 DIAGNOSIS — Z00.00 ROUTINE GENERAL MEDICAL EXAMINATION AT A HEALTH CARE FACILITY: ICD-10-CM

## 2019-09-24 DIAGNOSIS — Z12.11 SCREENING FOR COLON CANCER: ICD-10-CM

## 2019-09-24 DIAGNOSIS — E55.9 VITAMIN D DEFICIENCY: ICD-10-CM

## 2019-09-24 DIAGNOSIS — I10 ESSENTIAL HYPERTENSION: Primary | ICD-10-CM

## 2019-09-24 PROCEDURE — 90653 IIV ADJUVANT VACCINE IM: CPT | Performed by: PHYSICIAN ASSISTANT

## 2019-09-24 PROCEDURE — G0439 PPPS, SUBSEQ VISIT: HCPCS | Performed by: PHYSICIAN ASSISTANT

## 2019-09-24 PROCEDURE — 96160 PT-FOCUSED HLTH RISK ASSMT: CPT | Performed by: PHYSICIAN ASSISTANT

## 2019-09-24 PROCEDURE — 36415 COLL VENOUS BLD VENIPUNCTURE: CPT | Performed by: PHYSICIAN ASSISTANT

## 2019-09-24 PROCEDURE — G0008 ADMIN INFLUENZA VIRUS VAC: HCPCS | Performed by: PHYSICIAN ASSISTANT

## 2019-09-24 NOTE — TELEPHONE ENCOUNTER
DR. DWYER  CAN YOU PLEASE REVIEW THIS PATIENTS CHART AND LET ME KNOW IF SHE NEEDS TO GO TO ENDO    THANK YOU  MERRICK

## 2019-09-24 NOTE — PROGRESS NOTES
The ABCs of the Annual Wellness Visit  Subsequent Medicare Wellness Visit    Chief Complaint   Patient presents with   • Medicare Wellness-subsequent     She is fasting       Subjective   History of Present Illness:  Tenisha Grayson is a 69 y.o. female who presents for a Subsequent Medicare Wellness Visit.    HEALTH RISK ASSESSMENT    Recent Hospitalizations:  No hospitalization(s) within the last year.    Current Medical Providers:  Patient Care Team:  Doris Canseco MD as PCP - General (Internal Medicine)  Abhishek Spence MD as PCP - Claims Attributed    Smoking Status:  Social History     Tobacco Use   Smoking Status Current Some Day Smoker   • Packs/day: 1.00   • Years: 44.00   • Pack years: 44.00   • Types: Cigarettes   Smokeless Tobacco Never Used   Tobacco Comment    Stopped 8/7/17       Alcohol Consumption:  Social History     Substance and Sexual Activity   Alcohol Use Yes   • Frequency: 2-3 times a week   • Drinks per session: 1 or 2   • Binge frequency: Never       Depression Screen:   PHQ-2/PHQ-9 Depression Screening 9/24/2019   Little interest or pleasure in doing things 1   Feeling down, depressed, or hopeless 1   Total Score 2       Fall Risk Screen:  STEADI Fall Risk Assessment was completed, and patient is at LOW risk for falls.Assessment completed on:9/24/2019    Health Habits and Functional and Cognitive Screening:  Functional & Cognitive Status 9/24/2019   Do you have difficulty preparing food and eating? No   Do you have difficulty bathing yourself, getting dressed or grooming yourself? No   Do you have difficulty using the toilet? No   Do you have difficulty moving around from place to place? No   Do you have trouble with steps or getting out of a bed or a chair? No   Current Diet Well Balanced Diet   Dental Exam Up to date   Eye Exam Not up to date   Exercise (times per week) 3 times per week   Do you need help using the phone?  No   Are you deaf or do you have serious difficulty  hearing?  No   Do you need help with transportation? No   Do you need help shopping? No   Do you need help preparing meals?  No   Do you need help with housework?  No   Do you need help with laundry? No   Do you need help taking your medications? No   Do you need help managing money? No   Do you ever drive or ride in a car without wearing a seat belt? No   Have you felt unusual stress, anger or loneliness in the last month? No   Who do you live with? Spouse   If you need help, do you have trouble finding someone available to you? No   Have you been bothered in the last four weeks by sexual problems? No   Do you have difficulty concentrating, remembering or making decisions? No         Does the patient have evidence of cognitive impairment? No    Asprin use counseling:Taking ASA appropriately as indicated    Age-appropriate Screening Schedule:  Refer to the list below for future screening recommendations based on patient's age, sex and/or medical conditions. Orders for these recommended tests are listed in the plan section. The patient has been provided with a written plan.    Health Maintenance   Topic Date Due   • ZOSTER VACCINE (1 of 2) 07/04/2001   • COLONOSCOPY  01/01/2018   • LIPID PANEL  08/21/2018   • PNEUMOCOCCAL VACCINES (65+ LOW/MEDIUM RISK) (2 of 2 - PPSV23) 04/19/2019   • INFLUENZA VACCINE  08/01/2019   • MAMMOGRAM  08/07/2020   • TDAP/TD VACCINES (3 - Td) 04/19/2028          The following portions of the patient's history were reviewed and updated as appropriate: allergies, current medications, past family history, past medical history, past social history, past surgical history and problem list.    Outpatient Medications Prior to Visit   Medication Sig Dispense Refill   • aspirin 325 MG tablet Take  by mouth.     • Hormone Cream Base cream Apply 2 clicks (0.5mL) to inner arm or thigh each day  Estradiol/Estriol/Testosterone Versabase CR 0.2/0.8/1mg/0.5mL 15 g 5   • levothyroxine (SYNTHROID, LEVOTHROID)  50 MCG tablet Take 1 tablet by mouth Daily.     • liothyronine (CYTOMEL) 5 MCG tablet Take 1 tablet by mouth Daily.     • losartan-hydrochlorothiazide (HYZAAR) 50-12.5 MG per tablet Take 1 tablet by mouth Daily. 90 tablet 3   • MULTIPLE VITAMIN PO Take  by mouth daily.     • atorvastatin (LIPITOR) 10 MG tablet Take 1 tablet by mouth Daily. 90 tablet 1   • Cholecalciferol (VITAMIN D3) 5000 UNITS tablet Take 1 tablet by mouth Daily. 30 tablet      No facility-administered medications prior to visit.        Patient Active Problem List   Diagnosis   • COPD, moderate (CMS/HCC)   • HLD (hyperlipidemia)   • Hypothyroidism   • Adnexal mass   • Tobacco use   • Vitamin D deficiency   • Essential hypertension   • Preventative health care   • LBBB (left bundle branch block)   • Menopausal and postmenopausal disorder   • Acquired hypothyroidism   • Benign essential hypertension   • Other fatigue   • Left bundle branch block (LBBB) determined by electrocardiography   • Overweight (BMI 25.0-29.9)   • Stress and adjustment reaction   • Screening for colon cancer       Advanced Care Planning:  Patient has an advance directive - a copy has not been provided. Have asked the patient to send this to us to add to record    Review of Systems   Constitutional: Negative for chills, fatigue and fever.   HENT: Negative for congestion, ear pain and sinus pressure.    Respiratory: Negative for cough, chest tightness, shortness of breath and wheezing.    Cardiovascular: Negative for chest pain and palpitations.   Gastrointestinal: Negative for abdominal pain, blood in stool and constipation.   Endocrine: Negative for cold intolerance and heat intolerance.   Skin: Negative for color change.   Allergic/Immunologic: Negative for environmental allergies.   Neurological: Negative for dizziness, speech difficulty and headaches.   Psychiatric/Behavioral: Negative for confusion. The patient is not nervous/anxious.        Compared to one year ago, the  "patient feels her physical health is the same.  Compared to one year ago, the patient feels her mental health is the same.    Reviewed chart for potential of high risk medication in the elderly: yes  Reviewed chart for potential of harmful drug interactions in the elderly:yes    Objective         Vitals:    09/24/19 0901   BP: 128/74   BP Location: Left arm   Patient Position: Sitting   Cuff Size: Adult   Pulse: 72   Weight: 64 kg (141 lb)   Height: 152.4 cm (60\")   PainSc: 0-No pain       Body mass index is 27.54 kg/m².  Discussed the patient's BMI with her. The BMI is in the acceptable range.    Physical Exam   Constitutional: She is oriented to person, place, and time. She appears well-developed and well-nourished.   HENT:   Head: Normocephalic and atraumatic.   Nose: Nose normal.   Mouth/Throat: Oropharynx is clear and moist.   Eyes: Conjunctivae and EOM are normal. Pupils are equal, round, and reactive to light.   Neck: Normal range of motion. Neck supple.   Cardiovascular: Normal rate, regular rhythm, normal heart sounds and intact distal pulses.   Pulmonary/Chest: Effort normal and breath sounds normal.   Neurological: She is alert and oriented to person, place, and time.   Skin: Skin is warm and dry.   Psychiatric: She has a normal mood and affect. Her behavior is normal. Judgment and thought content normal.   Nursing note and vitals reviewed.            Assessment/Plan   Medicare Risks and Personalized Health Plan  CMS Preventative Services Quick Reference  Cardiovascular risk    The above risks/problems have been discussed with the patient.  Pertinent information has been shared with the patient in the After Visit Summary.  Follow up plans and orders are seen below in the Assessment/Plan Section.    Diagnoses and all orders for this visit:    1. Essential hypertension (Primary)  -     MicroAlbumin, Urine, Random - Urine, Clean Catch; Future  -     CBC & Differential; Future  -     CBC & Differential  -     " MicroAlbumin, Urine, Random -    2. Vitamin D deficiency  -     Vitamin D 25 Hydroxy; Future  -     Vitamin D 25 Hydroxy    3. Hypothyroidism due to acquired atrophy of thyroid  -     TSH; Future  -     T4, Free; Future  -     CBC & Differential; Future  -     CBC & Differential  -     T4, Free  -     TSH    4. Pure hypercholesterolemia  -     Comprehensive Metabolic Panel; Future  -     Lipid Panel; Future  -     CBC & Differential; Future  -     CBC & Differential  -     Lipid Panel  -     Comprehensive Metabolic Panel    5. Menopausal and postmenopausal disorder  -     CBC & Differential; Future  -     Estradiol; Future  -     Progesterone; Future  -     Testosterone (Free & Total), LC / MS; Future  -     Testosterone (Free & Total), LC / MS  -     Progesterone  -     Estradiol  -     CBC & Differential    6. Screening for colon cancer  -     Ambulatory Referral For Screening Colonoscopy    7. COPD, moderate (CMS/HCC)  Assessment & Plan:  COPD is unchanged.  COPD information handout given.          Other orders  -     Cancel: Ambulatory Referral to Advance Care Planning  -     Fluad Tri 65yr+    Follow Up:  Return for Annual physical.     An After Visit Summary and PPPS were given to the patient.

## 2019-09-24 NOTE — PATIENT INSTRUCTIONS
"BMI for Adults    Body mass index (BMI) is a number that is calculated from a person's weight and height. BMI may help to estimate how much of a person's weight is composed of fat. BMI can help identify those who may be at higher risk for certain medical problems.  How is BMI used with adults?  BMI is used as a screening tool to identify possible weight problems. It is used to check whether a person is obese, overweight, healthy weight, or underweight.  How is BMI calculated?  BMI measures your weight and compares it to your height. This can be done either in English ([place] or metric measurements. Note that charts are available to help you find your BMI quickly and easily without having to do these calculations yourself.  To calculate your BMI in English ([place] measurements, your health care provider will:  1. Measure your weight in pounds (lb).  2. Multiply the number of pounds by 703.  ? For example, for a person who weighs 180 lb, multiply that number by 703, which equals 126,540.  3. Measure your height in inches (in). Then multiply that number by itself to get a measurement called \"inches squared.\"  ? For example, for a person who is 70 in tall, the \"inches squared\" measurement is 70 in x 70 in, which equals 4900 inches squared.  4. Divide the total from Step 2 (number of lb x 703) by the total from Step 3 (inches squared): 126,540 ÷ 4900 = 25.8. This is your BMI.  To calculate your BMI in metric measurements, your health care provider will:  1. Measure your weight in kilograms (kg).  2. Measure your height in meters (m). Then multiply that number by itself to get a measurement called \"meters squared.\"  ? For example, for a person who is 1.75 m tall, the \"meters squared\" measurement is 1.75 m x 1.75 m, which is equal to 3.1 meters squared.  3. Divide the number of kilograms (your weight) by the meters squared number. In this example: 70 ÷ 3.1 = 22.6. This is your BMI.  How is BMI interpreted?  To interpret your " results, your health care provider will use BMI charts to identify whether you are underweight, normal weight, overweight, or obese. The following guidelines will be used:  · Underweight: BMI less than 18.5.  · Normal weight: BMI between 18.5 and 24.9.  · Overweight: BMI between 25 and 29.9.  · Obese: BMI of 30 and above.  Please note:  · Weight includes both fat and muscle, so someone with a muscular build, such as an athlete, may have a BMI that is higher than 24.9. In cases like these, BMI is not an accurate measure of body fat.  · To determine if excess body fat is the cause of a BMI of 25 or higher, further assessments may need to be done by a health care provider.  · BMI is usually interpreted in the same way for men and women.  Why is BMI a useful tool?  BMI is useful in two ways:  · Identifying a weight problem that may be related to a medical condition, or that may increase the risk for medical problems.  · Promoting lifestyle and diet changes in order to reach a healthy weight.  Summary  · Body mass index (BMI) is a number that is calculated from a person's weight and height.  · BMI may help to estimate how much of a person's weight is composed of fat. BMI can help identify those who may be at higher risk for certain medical problems.  · BMI can be measured using English measurements or metric measurements.  · To interpret your results, your health care provider will use BMI charts to identify whether you are underweight, normal weight, overweight, or obese.  This information is not intended to replace advice given to you by your health care provider. Make sure you discuss any questions you have with your health care provider.  Document Released: 08/29/2005 Document Revised: 10/31/2018 Document Reviewed: 10/31/2018  [employer] Interactive Patient Education © 2019 [employer]    Medicare Wellness  Personal Prevention Plan of Service     Date of Office Visit:  09/24/2019  Encounter Provider:  Eva Walton  NIKKY Marquez  Place of Service:  [facility]  Patient Name: Tenisha Grayson  :  1950    As part of the Medicare Wellness portion of your visit today, we are providing you with this personalized preventive plan of services (PPPS). This plan is based upon recommendations of the [place] Preventive Services Task Force (USPSTF) and the Advisory Committee on Immunization Practices (ACIP).    This lists the preventive care services that should be considered, and provides dates of when you are due. Items listed as completed are up-to-date and do not require any further intervention.    Health Maintenance   Topic Date Due   • ZOSTER VACCINE (1 of 2) 2001   • LUNG CANCER SCREENING  2006   • COLONOSCOPY  2018   • LIPID PANEL  2018   • PNEUMOCOCCAL VACCINES (65+ LOW/MEDIUM RISK) (2 of 2 - PPSV23) 2019   • INFLUENZA VACCINE  2019   • MEDICARE ANNUAL WELLNESS  2019   • MAMMOGRAM  2020   • TDAP/TD VACCINES (3 - Td) 2028   • HEPATITIS C SCREENING  Completed       Orders Placed This Encounter   Procedures   • Fluad Tri 65yr+   • Comprehensive Metabolic Panel     Standing Status:   Future     Standing Expiration Date:   2020   • Lipid Panel     Standing Status:   Future     Standing Expiration Date:   2020   • MicroAlbumin, Urine, Random - Urine, Clean Catch     Standing Status:   Future     Standing Expiration Date:   2020   • TSH     Standing Status:   Future     Standing Expiration Date:   2020   • T4, Free     Standing Status:   Future     Standing Expiration Date:   2020   • Vitamin D 25 Hydroxy     Standing Status:   Future     Standing Expiration Date:   2020   • Estradiol     Standing Status:   Future     Standing Expiration Date:   2020   • Progesterone     Standing Status:   Future     Standing Expiration Date:   2020   • Testosterone (Free & Total), LC / MS     Standing Status:    Future     Standing Expiration Date:   9/24/2020   • Ambulatory Referral For Screening Colonoscopy     Referral Priority:   Routine     Referral Type:   Diagnostic Medical     Referral Reason:   Specialty Services Required     Referred to Provider:   Leroy Harris MD     Requested Specialty:   Gastroenterology     Number of Visits Requested:   1   • CBC & Differential     Standing Status:   Future     Standing Expiration Date:   9/24/2020     Order Specific Question:   Manual Differential     Answer:   No       Return for Annual physical.        Chronic Obstructive Pulmonary Disease    Chronic obstructive pulmonary disease (COPD) is a long-term (chronic) condition that affects the lungs. COPD is a general term that can be used to describe many different lung problems that cause lung swelling (inflammation) and limit airflow, including chronic bronchitis and emphysema. If you have COPD, your lung function will probably never return to normal. In most cases, it gets worse over time. However, there are steps you can take to slow the progression of the disease and improve your quality of life.  What are the causes?  This condition may be caused by:  · Smoking. This is the most common cause.  · Certain genes passed down through families.  What increases the risk?  The following factors may make you more likely to develop this condition:  · Secondhand smoke from cigarettes, pipes, or cigars.  · Exposure to chemicals and other irritants such as fumes and dust in the work environment.  · Chronic lung conditions or infections.  What are the signs or symptoms?  Symptoms of this condition include:  · Shortness of breath, especially during physical activity.  · Chronic cough with a large amount of thick mucus. Sometimes the cough may not have any mucus (dry cough).  · Wheezing.  · Rapid breaths.  · Gray or bluish discoloration (cyanosis) of the skin, especially in your fingers, toes, or lips.  · Feeling tired  (fatigue).  · Weight loss.  · Chest tightness.  · Frequent infections.  · Episodes when breathing symptoms become much worse (exacerbations).  · Swelling in the ankles, feet, or legs. This may occur in later stages of the disease.  How is this diagnosed?  This condition is diagnosed based on:  · Your medical history.  · A physical exam.  You may also have tests, including:  · Lung (pulmonary) function tests. This may include a spirometry test, which measures your ability to exhale properly.  · Chest X-ray.  · CT scan.  · Blood tests.  How is this treated?  This condition may be treated with:  · Medicines. These may include inhaled rescue medicines to treat acute exacerbations as well as long-term, or maintenance, medicines to prevent flare-ups of COPD.  ? Bronchodilators help treat COPD by dilating the airways to allow increased airflow and make your breathing more comfortable.  ? Steroids can reduce airway inflammation and help prevent exacerbations.  · Smoking cessation. If you smoke, your health care provider may ask you to quit, and may also recommend therapy or replacement products to help you quit.  · Pulmonary rehabilitation. This may involve working with a team of health care providers and specialists, such as respiratory, occupational, and physical therapists.  · Exercise and physical activity. These are beneficial for nearly all people with COPD.  · Nutrition therapy to gain weight, if you are underweight.  · Oxygen. Supplemental oxygen therapy is only helpful if you have a low oxygen level in your blood (hypoxemia).  · Lung surgery or transplant.  · Palliative care. This is to help people with COPD feel comfortable when treatment is no longer working.  Follow these instructions at home:  Medicines  · Take over-the-counter and prescription medicines (inhaled or pills) only as told by your health care provider.  · Talk to your health care provider before taking any cough or allergy medicines. You may need  to avoid certain medicines that dry out your airways.  Lifestyle  · If you are a smoker, the most important thing that you can do is to stop smoking. Do not use any products that contain nicotine or tobacco, such as cigarettes and e-cigarettes. If you need help quitting, ask your health care provider. Continuing to smoke will cause the disease to progress faster.  · Avoid exposure to things that irritate your lungs, such as smoke, chemicals, and fumes.  · Stay active, but balance activity with periods of rest. Exercise and physical activity will help you maintain your ability to do things you want to do.  · Learn and use relaxation techniques to manage stress and to control your breathing.  · Get the right amount of sleep and get quality sleep. Most adults need 7 or more hours per night.  · Eat healthy foods. Eating smaller, more frequent meals and resting before meals may help you maintain your strength.  Controlled breathing  Learn and use controlled breathing techniques as directed by your health care provider. Controlled breathing techniques include:  · Pursed lip breathing. Start by breathing in (inhaling) through your nose for 1 second. Then, purse your lips as if you were going to whistle and breathe out (exhale) through the pursed lips for 2 seconds.  · Diaphragmatic breathing. Start by putting one hand on your abdomen just above your waist. Inhale slowly through your nose. The hand on your abdomen should move out. Then purse your lips and exhale slowly. You should be able to feel the hand on your abdomen moving in as you exhale.  Controlled coughing  Learn and use controlled coughing to clear mucus from your lungs. Controlled coughing is a series of short, progressive coughs. The steps of controlled coughing are:  1. Lean your head slightly forward.  2. Breathe in deeply using diaphragmatic breathing.  3. Try to hold your breath for 3 seconds.  4. Keep your mouth slightly open while coughing twice.  5. Spit  any mucus out into a tissue.  6. Rest and repeat the steps once or twice as needed.  General instructions  · Make sure you receive all the vaccines that your health care provider recommends, especially the pneumococcal and influenza vaccines. Preventing infection and hospitalization is very important when you have COPD.  · Use oxygen therapy and pulmonary rehabilitation if directed to by your health care provider. If you require home oxygen therapy, ask your health care provider whether you should purchase a pulse oximeter to measure your oxygen level at home.  · Work with your health care provider to develop a COPD action plan. This will help you know what steps to take if your condition gets worse.  · Keep other chronic health conditions under control as told by your health care provider.  · Avoid extreme temperature and humidity changes.  · Avoid contact with people who have an illness that spreads from person to person (is contagious), such as viral infections or pneumonia.  · Keep all follow-up visits as told by your health care provider. This is important.  Contact a health care provider if:  · You are coughing up more mucus than usual.  · There is a change in the color or thickness of your mucus.  · Your breathing is more labored than usual.  · Your breathing is faster than usual.  · You have difficulty sleeping.  · You need to use your rescue medicines or inhalers more often than expected.  · You have trouble doing routine activities such as getting dressed or walking around the house.  Get help right away if:  · You have shortness of breath while you are resting.  · You have shortness of breath that prevents you from:  ? Being able to talk.  ? Performing your usual physical activities.  · You have chest pain lasting longer than 5 minutes.  · Your skin color is more blue (cyanotic) than usual.  · You measure low oxygen saturations for longer than 5 minutes with a pulse oximeter.  · You have a fever.  · You  feel too tired to breathe normally.  Summary  · Chronic obstructive pulmonary disease (COPD) is a long-term (chronic) condition that affects the lungs.  · Your lung function will probably never return to normal. In most cases, it gets worse over time. However, there are steps you can take to slow the progression of the disease and improve your quality of life.  · Treatment for COPD may include taking medicines, quitting smoking, pulmonary rehabilitation, and changes to diet and exercise. As the disease progresses, you may need oxygen therapy, a lung transplant, or palliative care.  · To help manage your condition, do not smoke, avoid exposure to things that irritate your lungs, stay up to date on all vaccines, and follow your health care provider's instructions for taking medicines.  This information is not intended to replace advice given to you by your health care provider. Make sure you discuss any questions you have with your health care provider.  Document Released: 09/27/2006 Document Revised: 06/13/2018 Document Reviewed: 01/22/2018  [employer] Interactive Patient Education © 2019 [employer]

## 2019-09-26 LAB
25(OH)D3+25(OH)D2 SERPL-MCNC: 36.7 NG/ML (ref 30–100)
ALBUMIN SERPL-MCNC: 4.4 G/DL (ref 3.5–5.2)
ALBUMIN/GLOB SERPL: 1.4 G/DL
ALP SERPL-CCNC: 94 U/L (ref 39–117)
ALT SERPL-CCNC: 23 U/L (ref 1–33)
AST SERPL-CCNC: 18 U/L (ref 1–32)
BASOPHILS # BLD AUTO: (no result) 10*3/UL
BASOPHILS # BLD MANUAL: 0.1 10*3/MM3 (ref 0–0.2)
BASOPHILS NFR BLD MANUAL: 1 % (ref 0–1.5)
BILIRUB SERPL-MCNC: 0.4 MG/DL (ref 0.2–1.2)
BUN SERPL-MCNC: 16 MG/DL (ref 8–23)
BUN/CREAT SERPL: 24.6 (ref 7–25)
CALCIUM SERPL-MCNC: 10.4 MG/DL (ref 8.6–10.5)
CHLORIDE SERPL-SCNC: 99 MMOL/L (ref 98–107)
CHOLEST SERPL-MCNC: 215 MG/DL (ref 0–200)
CO2 SERPL-SCNC: 23.5 MMOL/L (ref 22–29)
CREAT SERPL-MCNC: 0.65 MG/DL (ref 0.57–1)
DIFFERENTIAL COMMENT: ABNORMAL
EOSINOPHIL # BLD AUTO: (no result) 10*3/UL
EOSINOPHIL # BLD MANUAL: 2.64 10*3/MM3 (ref 0–0.4)
EOSINOPHIL NFR BLD AUTO: (no result) %
EOSINOPHIL NFR BLD MANUAL: 25.3 % (ref 0.3–6.2)
ERYTHROCYTE [DISTWIDTH] IN BLOOD BY AUTOMATED COUNT: 11.7 % (ref 12.3–15.4)
ESTRADIOL SERPL-MCNC: 39.4 PG/ML
GLOBULIN SER CALC-MCNC: 3.2 GM/DL
GLUCOSE SERPL-MCNC: 101 MG/DL (ref 65–99)
HCT VFR BLD AUTO: 45 % (ref 34–46.6)
HDLC SERPL-MCNC: 57 MG/DL (ref 40–60)
HGB BLD-MCNC: 15.1 G/DL (ref 12–15.9)
LDLC SERPL CALC-MCNC: 117 MG/DL (ref 0–100)
LYMPHOCYTES # BLD AUTO: (no result) 10*3/UL
LYMPHOCYTES # BLD MANUAL: 1.79 10*3/MM3 (ref 0.7–3.1)
LYMPHOCYTES NFR BLD AUTO: (no result) %
LYMPHOCYTES NFR BLD MANUAL: 17.2 % (ref 19.6–45.3)
MCH RBC QN AUTO: 32.5 PG (ref 26.6–33)
MCHC RBC AUTO-ENTMCNC: 33.6 G/DL (ref 31.5–35.7)
MCV RBC AUTO: 96.8 FL (ref 79–97)
MICROALBUMIN UR-MCNC: 6.4 UG/ML
MONOCYTES # BLD MANUAL: 0.21 10*3/MM3 (ref 0.1–0.9)
MONOCYTES NFR BLD AUTO: (no result) %
MONOCYTES NFR BLD MANUAL: 2 % (ref 5–12)
NEUTROPHILS # BLD MANUAL: 5.68 10*3/MM3 (ref 1.7–7)
NEUTROPHILS NFR BLD AUTO: (no result) %
NEUTROPHILS NFR BLD MANUAL: 54.5 % (ref 42.7–76)
PLATELET # BLD AUTO: 374 10*3/MM3 (ref 140–450)
PLATELET BLD QL SMEAR: ABNORMAL
POTASSIUM SERPL-SCNC: 4.4 MMOL/L (ref 3.5–5.2)
PROGEST SERPL-MCNC: 8.5 NG/ML
PROT SERPL-MCNC: 7.6 G/DL (ref 6–8.5)
RBC # BLD AUTO: 4.65 10*6/MM3 (ref 3.77–5.28)
RBC MORPH BLD: ABNORMAL
SODIUM SERPL-SCNC: 138 MMOL/L (ref 136–145)
T4 FREE SERPL-MCNC: 1.82 NG/DL (ref 0.93–1.7)
TESTOST FREE SERPL-MCNC: 3.7 PG/ML (ref 0–4.2)
TESTOST SERPL-MCNC: 67.4 NG/DL (ref 7–40)
TRIGL SERPL-MCNC: 204 MG/DL (ref 0–150)
TSH SERPL DL<=0.005 MIU/L-ACNC: 0.03 UIU/ML (ref 0.27–4.2)
VLDLC SERPL CALC-MCNC: 40.8 MG/DL
WBC # BLD AUTO: 10.42 10*3/MM3 (ref 3.4–10.8)

## 2019-11-15 NOTE — TELEPHONE ENCOUNTER
I will send prescription in.  She did come to her physical in September with brett.  She has not seen me for the second part yet.  Please call her  to schedule

## 2019-11-18 NOTE — TELEPHONE ENCOUNTER
PHARMACY HAS CALLED THEY WANT  A  CLARIFICATION ON PROGESTERONE 200MG  WETHER SHE NEEDS SLOW RELEASE CAPSULE OR COMMERCIAL PROMETRIUM  [phone]

## 2019-11-19 ENCOUNTER — TELEPHONE (OUTPATIENT)
Dept: INTERNAL MEDICINE | Facility: CLINIC | Age: 69
End: 2019-11-19

## 2019-11-19 NOTE — TELEPHONE ENCOUNTER
PHARMACY CALLING TO SEE IF THE PROGESTERONE 200MG REQUEST THAT WAS SENT IN SHOULD BE EXTENDED RELEASE OR NOT. STATES THAT THE PT NORMALLY TAKES EXTENDED RELEASE, BUT WANTED TO MAKE SURE BEFORE FILLING IT.

## 2019-11-20 NOTE — TELEPHONE ENCOUNTER
Called pharmacy and advised that we don't have the option in our system to pick an extended release progesterone but we want her to have the same thing she always has. They said they would make note and keep it on file.

## 2019-12-09 RX ORDER — LEVOTHYROXINE SODIUM 0.07 MG/1
TABLET ORAL
Qty: 30 TABLET | Refills: 11 | Status: SHIPPED | OUTPATIENT
Start: 2019-12-09 | End: 2020-01-27 | Stop reason: SDUPTHER

## 2019-12-19 ENCOUNTER — TELEPHONE (OUTPATIENT)
Dept: INTERNAL MEDICINE | Facility: CLINIC | Age: 69
End: 2019-12-19

## 2019-12-19 NOTE — TELEPHONE ENCOUNTER
"[facility] was calling to get refill on patients progesterone SLOW RELEASE 200 mg capsules and they requested to have more than one month refill at a time if possible due to them having to \"loan\" the patient 3 days worth to get through until the authorization goes through for the next month please advise and call pharmacy with any questions or concerns [phone]   "

## 2019-12-20 NOTE — TELEPHONE ENCOUNTER
LM for pt to call back to schedule appt. Sent a RF to [facility] with a note on it that the pt would need to be seen for more RFs

## 2019-12-23 ENCOUNTER — TELEPHONE (OUTPATIENT)
Dept: INTERNAL MEDICINE | Facility: CLINIC | Age: 69
End: 2019-12-23

## 2019-12-23 NOTE — TELEPHONE ENCOUNTER
Pt called and requested status of med refill request for progesterone (PROMETRIUM) 200 MG capsule.       [facility] pharmacy confirmed.     Pt contact: [phone].

## 2019-12-23 NOTE — TELEPHONE ENCOUNTER
Spoke with Yun at [facility], to verify order. She will put a note on there for the to schedule an appt.

## 2019-12-23 NOTE — TELEPHONE ENCOUNTER
Yun with [facility] called requesting clarification on RX -  progesterone (PROMETRIUM) 200 MG capsule that was sent in. Will need by 12/23/2019 was previously provided a 3 day supply.    Please Advise     Callback Yun @ [phone]

## 2020-01-09 ENCOUNTER — TELEPHONE (OUTPATIENT)
Dept: INTERNAL MEDICINE | Facility: CLINIC | Age: 70
End: 2020-01-09

## 2020-01-09 NOTE — TELEPHONE ENCOUNTER
Order was sent to [facility].  I do not know if they will be able to compound it today or not.  Not sure how long it takes.

## 2020-01-09 NOTE — TELEPHONE ENCOUNTER
Called pt and hse verbalized understanding. Called in cream to [facility] and spoke with Yun.

## 2020-01-09 NOTE — TELEPHONE ENCOUNTER
Pt called in and stated that she did not realize that she was out of refills for Hormone Cream Base cream. Pt is leaving out of town in the morning and will be gone for 10 days. Pt has a future appt scheduled on 1/27/20. Pt wanted to know if she can get a refill until her next appt. Please call pt to advise.

## 2020-01-21 RX ORDER — LOSARTAN POTASSIUM AND HYDROCHLOROTHIAZIDE 12.5; 5 MG/1; MG/1
TABLET ORAL
Qty: 90 TABLET | Refills: 3 | Status: SHIPPED | OUTPATIENT
Start: 2020-01-21 | End: 2020-01-27 | Stop reason: RX

## 2020-01-27 ENCOUNTER — OFFICE VISIT (OUTPATIENT)
Dept: INTERNAL MEDICINE | Facility: CLINIC | Age: 70
End: 2020-01-27

## 2020-01-27 VITALS
WEIGHT: 147 LBS | OXYGEN SATURATION: 98 % | SYSTOLIC BLOOD PRESSURE: 140 MMHG | HEART RATE: 67 BPM | DIASTOLIC BLOOD PRESSURE: 80 MMHG | HEIGHT: 60 IN | BODY MASS INDEX: 28.86 KG/M2

## 2020-01-27 DIAGNOSIS — Z12.11 ENCOUNTER FOR SCREENING COLONOSCOPY: ICD-10-CM

## 2020-01-27 DIAGNOSIS — Z72.0 TOBACCO USE: ICD-10-CM

## 2020-01-27 DIAGNOSIS — M85.89 OSTEOPENIA OF MULTIPLE SITES: Chronic | ICD-10-CM

## 2020-01-27 DIAGNOSIS — E55.9 VITAMIN D DEFICIENCY: ICD-10-CM

## 2020-01-27 DIAGNOSIS — I44.7 LEFT BUNDLE BRANCH BLOCK (LBBB) DETERMINED BY ELECTROCARDIOGRAPHY: ICD-10-CM

## 2020-01-27 DIAGNOSIS — I10 BENIGN ESSENTIAL HYPERTENSION: ICD-10-CM

## 2020-01-27 DIAGNOSIS — Z12.11 SCREENING FOR COLON CANCER: ICD-10-CM

## 2020-01-27 DIAGNOSIS — N95.9 MENOPAUSAL AND POSTMENOPAUSAL DISORDER: ICD-10-CM

## 2020-01-27 DIAGNOSIS — J44.9 COPD, MODERATE (HCC): ICD-10-CM

## 2020-01-27 DIAGNOSIS — E78.2 MIXED HYPERLIPIDEMIA: ICD-10-CM

## 2020-01-27 DIAGNOSIS — E03.9 ACQUIRED HYPOTHYROIDISM: Primary | ICD-10-CM

## 2020-01-27 DIAGNOSIS — E66.3 OVERWEIGHT (BMI 25.0-29.9): ICD-10-CM

## 2020-01-27 PROCEDURE — 99214 OFFICE O/P EST MOD 30 MIN: CPT | Performed by: INTERNAL MEDICINE

## 2020-01-27 PROCEDURE — G0009 ADMIN PNEUMOCOCCAL VACCINE: HCPCS | Performed by: INTERNAL MEDICINE

## 2020-01-27 PROCEDURE — 90732 PPSV23 VACC 2 YRS+ SUBQ/IM: CPT | Performed by: INTERNAL MEDICINE

## 2020-01-27 PROCEDURE — 93000 ELECTROCARDIOGRAM COMPLETE: CPT | Performed by: INTERNAL MEDICINE

## 2020-01-27 RX ORDER — ACETAMINOPHEN 160 MG
2000 TABLET,DISINTEGRATING ORAL DAILY
Qty: 30 CAPSULE | Refills: 11
Start: 2020-01-27 | End: 2021-01-26

## 2020-01-27 RX ORDER — LOSARTAN POTASSIUM 50 MG/1
1 TABLET ORAL DAILY
COMMUNITY
Start: 2020-01-21 | End: 2021-01-06 | Stop reason: SDUPTHER

## 2020-01-27 RX ORDER — HYDROCHLOROTHIAZIDE 12.5 MG/1
1 TABLET ORAL DAILY
COMMUNITY
Start: 2020-01-21 | End: 2020-11-10

## 2020-01-27 RX ORDER — LEVOTHYROXINE SODIUM 0.05 MG/1
50 TABLET ORAL DAILY
Qty: 90 TABLET | Refills: 1 | Status: SHIPPED | OUTPATIENT
Start: 2020-01-27 | End: 2020-08-31

## 2020-01-27 NOTE — PATIENT INSTRUCTIONS
Health Risks of Smoking  Smoking cigarettes is very bad for your health. Tobacco smoke has over 200 known poisons in it. It contains the poisonous gases nitrogen oxide and carbon monoxide. There are over 60 chemicals in tobacco smoke that cause cancer.  Smoking is difficult to quit because a chemical in tobacco, called nicotine, causes addiction or dependence. When you smoke and inhale, nicotine is absorbed rapidly into the bloodstream through your lungs. Both inhaled and non-inhaled nicotine may be addictive.  What are the risks of cigarette smoke?  Cigarette smokers have an increased risk of many serious medical problems, including:  · Lung cancer.  · Lung disease, such as pneumonia, bronchitis, and emphysema.  · Chest pain (angina) and heart attack because the heart is not getting enough oxygen.  · Heart disease and peripheral blood vessel disease.  · High blood pressure (hypertension).  · Stroke.  · Oral cancer, including cancer of the lip, mouth, or voice box.  · Bladder cancer.  · Pancreatic cancer.  · Cervical cancer.  · Pregnancy complications, including premature birth.  · Stillbirths and smaller  babies, birth defects, and genetic damage to sperm.  · Early menopause.  · Lower estrogen level for women.  · Infertility.  · Facial wrinkles.  · Blindness.  · Increased risk of broken bones (fractures).  · Senile dementia.  · Stomach ulcers and internal bleeding.  · Delayed wound healing and increased risk of complications during surgery.  · Even smoking lightly shortens your life expectancy by several years.  Because of secondhand smoke exposure, children of smokers have an increased risk of the following:  · Sudden infant death syndrome (SIDS).  · Respiratory infections.  · Lung cancer.  · Heart disease.  · Ear infections.  What are the benefits of quitting?  There are many health benefits of quitting smoking. Here are some of them:  · Within days of quitting smoking, your risk of having a heart attack  decreases, your blood flow improves, and your lung capacity improves. Blood pressure, pulse rate, and breathing patterns start returning to normal soon after quitting.  · Within months, your lungs may clear up completely.  · Quitting for 10 years reduces your risk of developing lung cancer and heart disease to almost that of a nonsmoker.  · People who quit may see an improvement in their overall quality of life.  How do I quit smoking?         Smoking is an addiction with both physical and psychological effects, and longtime habits can be hard to change. Your health care provider can recommend:  · Programs and community resources, which may include group support, education, or talk therapy.  · Prescription medicines to help reduce cravings.  · Nicotine replacement products, such as patches, gum, and nasal sprays. Use these products only as directed. Do not replace cigarette smoking with electronic cigarettes, which are commonly called e-cigarettes. The safety of e-cigarettes is not known, and some may contain harmful chemicals.  · A combination of two or more of these methods.  Where to find more information  · American Lung Association: www.lung.org  · American Cancer Society: www.cancer.org  Summary  · Smoking cigarettes is very bad for your health. Cigarette smokers have an increased risk of many serious medical problems, including several cancers, heart disease, and stroke.  · Smoking is an addiction with both physical and psychological effects, and longtime habits can be hard to change.  · By stopping right away, you can greatly reduce the risk of medical problems for you and your family.  · To help you quit smoking, your health care provider can recommend programs, community resources, prescription medicines, and nicotine replacement products such as patches, gum, and nasal sprays.  This information is not intended to replace advice given to you by your health care provider. Make sure you discuss any questions  you have with your health care provider.  Document Released: 01/25/2006 Document Revised: 03/21/2019 Document Reviewed: 12/22/2017  [employer] Interactive Patient Education © 2019 [employer]  Patient Instructions   Problem List Items Addressed This Visit        Cardiovascular and Mediastinum    Benign essential hypertension    Overview     1/27/2020 Doris Canseco MD    Continue losartan and hydrochlorothiazide daily.  Avoid salt in the diet.  Avoid sodas.    Check the blood pressures at home 2-3 times a week.  Let us know if it is running more than 130/80.  Goal is to keep it around 120/70.         Relevant Medications    hydroCHLOROthiazide (HYDRODIURIL) 12.5 MG tablet    losartan (COZAAR) 50 MG tablet    Mixed hyperlipidemia    Overview     1/27/2020 Doris Canseco MD    Increase exercise and try to improve low fat and low sugar diet.            Respiratory    COPD, moderate (CMS/HCC)       Digestive    Vitamin D deficiency    Overview     1/27/2020 Doris Canseco MD    Take 2000 units of vitamin D3 daily.            Endocrine    Acquired hypothyroidism - Primary    Overview     1/27/2020 Doris Canseco MD    Decrease levothyroxine to 50 mcg daily.    Recheck thyroid levels in 6 to 8 weeks.         Relevant Medications    levothyroxine (SYNTHROID, LEVOTHROID) 50 MCG tablet    Other Relevant Orders    TSH    T4, Free       Musculoskeletal and Integument    Osteopenia of multiple sites (Chronic)    Overview     1/27/2020 Doris Canseco MD    Resume weightbearing exercises at the gym.  Try to walk some every day.  Also use small hand weights about 5 minutes a day when you are not at the gym.    Take 2000 units vitamin D daily.  Also take 500 to 600 mg of calcium daily.            Other    Screening for colon cancer    Tobacco use    Overview     1/27/2020 Doris Canseco MD    She admits smoking an occasional cigarette.  She was advised to totally abstain from smoking to decrease heart disease risk  and lung cancer risk.         Menopausal and postmenopausal disorder    Overview     1/27/2020 Doris Canseco MD    Will send her hormone levels to Ms. Humphreys at [facility] to adjust her hormone dose.         Relevant Orders    DEXA Bone Density Axial    Overweight (BMI 25.0-29.9)    Overview     1/27/2020 Doris Canseco MD    Resume exercising regularly.  Decrease portion sizes at mealtime.  Decrease fats and sugars and snack foods in the diet.           Other Visit Diagnoses     Encounter for screening colonoscopy        Relevant Orders    Ambulatory Referral For Screening Colonoscopy

## 2020-01-27 NOTE — PROGRESS NOTES
[facility]     Tenisha Grayson  1950   [id number]      Patient Care Team:  Doris Canseco MD as PCP - General (Internal Medicine)  Deisy Sumner MD as Consulting Physician (Dermatology)    Chief Complaint   Patient presents with   • Hypertension     f/u   • Hyperlipidemia   • Arthritis     in hands, using CBD            HPI  Patient is a 69 y.o. female presents with follow up hypertension,hyperllipidemia,hypothyroidism.      CHRONIC CONDITIONS  Had not been exercising for several months.. Just now resumed exercise.   Tries to eat low fat.    She complains of being hot at night and waking up,but just couple nights a week. No day time hot flashes. No vaginal dryness.Denies irritability and anxiety.  Her hormones have been compounded at [facility].    She has not checked blood pressures at home.  She does take hydrochlorothiazide and losartan daily.    Taking levothyroxine 75 mcg daily.  Not taking any T3.    She denies any cough, wheeze, shortness of breath.    Past Medical History:   Diagnosis Date   • Abnormal EKG    • Atrophic vaginitis    • COPD (chronic obstructive pulmonary disease) (CMS/HCC) 1995   • Fen-phen exposure 1996    negative workup   • Fibroids 1991    MANJULA and BSO   • Hyperlipidemia    • Hypertension    • Hypothyroidism 2000   • Obesity, mild 1990   • Rectocele    • Thyroid disease    • Vitamin D deficiency disease        Past Surgical History:   Procedure Laterality Date   • CARDIAC CATHETERIZATION Left 2014    Normal coronaries   • COLONOSCOPY  2008    Normal study   • OTHER SURGICAL HISTORY  2013    Rectocele repair   • TOTAL ABDOMINAL HYSTERECTOMY WITH SALPINGO OOPHORECTOMY  1991    For fibroids and bleeding       Family History   Problem Relation Age of Onset   • COPD Mother    • Other Mother         Polio   • Osteoporosis Mother    • Heart attack Father         Cause of death, Non smoker   • Hypertension Father    • Breast cancer Sister 60   • Alcohol abuse Brother     • Suicidality Brother    • Thrombophlebitis Brother    • No Known Problems Brother    • Unexplained death Other    • Breast cancer Daughter 28   • Hypertension Other    • Coronary artery disease Other    • Ovarian cancer Neg Hx        Social History     Socioeconomic History   • Marital status:      Spouse name: Not on file   • Number of children: Not on file   • Years of education: Not on file   • Highest education level: Not on file   Tobacco Use   • Smoking status: Current Some Day Smoker     Packs/day: 1.00     Years: 44.00     Pack years: 44.00     Types: Cigarettes   • Smokeless tobacco: Never Used   • Tobacco comment: Stopped 8/7/17   Substance and Sexual Activity   • Alcohol use: Yes     Frequency: 2-3 times a week     Drinks per session: 1 or 2     Binge frequency: Never   • Drug use: No   • Sexual activity: Defer   Social History Narrative    Domestic life : Lives in private home with         Christian : Taoist        Sleep hygiene : In bed 10 PM to 7 AM for 8 hours of sleep        Caffeine use : 3 cups of coffee daily        Exercise habits : Walks 30 minutes daily - no upper body exercise 2017        Diet : Well-balanced diet low in salt low in sugar        Occupation : Homemaker        Hearing : No impairment        Vision : Corrects with glasses        Dental : Adequate dentition        Driving : No limitations       Allergies   Allergen Reactions   • Bupropion      Drowsiness       Review of Systems:     Review of Systems   Constitutional: Negative for chills, fatigue and fever.   HENT: Negative for sinus pressure and sore throat.    Eyes: Negative for visual disturbance.   Respiratory: Negative for cough, shortness of breath and wheezing.    Cardiovascular: Negative for chest pain, palpitations and leg swelling.   Gastrointestinal: Negative for abdominal pain, blood in stool, constipation and diarrhea.   Endocrine: Positive for heat intolerance. Negative for cold intolerance.  "  Genitourinary: Negative for dysuria and frequency.   Musculoskeletal: Negative for arthralgias and back pain.   Skin: Negative for rash and skin lesions.   Allergic/Immunologic: Negative for food allergies.   Neurological: Negative for dizziness and headache.   Hematological: Negative for adenopathy. Does not bruise/bleed easily.   Psychiatric/Behavioral: Positive for sleep disturbance. Negative for suicidal ideas and depressed mood. The patient is not nervous/anxious.        ECG 12 Lead  Date/Time: 1/27/2020 10:15 AM  Performed by: Doris Canseco MD  Authorized by: Doris Canseco MD   Comparison: compared with previous ECG   Similar to previous ECG  Rhythm: sinus rhythm  Rate: normal  BPM: 60  Conduction: left bundle branch block  ST Segments: ST segments normal  T Waves: T waves normal  QRS axis: left    Clinical impression: abnormal EKG  Comments: No change in chronic LBBB and LAD.              Vital Signs  Vitals:    01/27/20 0901   BP: 140/80   BP Location: Left arm   Patient Position: Sitting   Cuff Size: Adult   Pulse: 67   SpO2: 98%   Weight: 66.7 kg (147 lb)   Height: 152.4 cm (60\")   PainSc: 0-No pain     Body mass index is 28.71 kg/m².      Current Outpatient Medications:   •  aspirin 325 MG tablet, Take  by mouth., Disp: , Rfl:   •  CBD (cannabidiol) oral oil, Take 1 drop by mouth 2 (Two) Times a Day., Disp: , Rfl:   •  Hormone Cream Base cream, Apply 2 clicks (0.5mL) to inner arm or thigh each day  Estradiol/Estriol/Testosterone Versabase CR 0.2/0.8/1mg/0.5mL, Disp: 15 g, Rfl: 5  •  hydroCHLOROthiazide (HYDRODIURIL) 12.5 MG tablet, Take 1 tablet by mouth Daily., Disp: , Rfl:   •  levothyroxine (SYNTHROID, LEVOTHROID) 50 MCG tablet, Take 1 tablet by mouth Daily., Disp: 90 tablet, Rfl: 1  •  losartan (COZAAR) 50 MG tablet, Take 1 tablet by mouth Daily., Disp: , Rfl:   •  MULTIPLE VITAMIN PO, Take  by mouth daily., Disp: , Rfl:   •  progesterone (PROMETRIUM) 200 MG capsule, Take 2 capsules by " mouth 1-2 hours before bedtime, Disp: 180 capsule, Rfl: 1  •  Cholecalciferol (VITAMIN D3) 50 MCG (2000 UT) capsule, Take 1 capsule by mouth Daily., Disp: 30 capsule, Rfl: 11    Physical Exam:    Physical Exam   Constitutional: She is oriented to person, place, and time. She appears well-developed and well-nourished. She appears overweight.   Eyes: Pupils are equal, round, and reactive to light. Conjunctivae and EOM are normal.   Neck: Normal range of motion. Neck supple. No thyromegaly present.   Cardiovascular: Normal rate, regular rhythm, normal heart sounds and intact distal pulses.   No murmur heard.  Pulmonary/Chest: Effort normal and breath sounds normal. She has no wheezes. Right breast exhibits no inverted nipple, no mass, no nipple discharge, no skin change and no tenderness. Left breast exhibits no inverted nipple, no mass, no nipple discharge, no skin change and no tenderness.   Abdominal: Soft. Bowel sounds are normal. She exhibits no distension and no mass. There is no tenderness.   Musculoskeletal: Normal range of motion. She exhibits no edema or tenderness.   Lymphadenopathy:     She has no cervical adenopathy.   Neurological: She is alert and oriented to person, place, and time. She has normal strength. No cranial nerve deficit or sensory deficit. Coordination and gait normal.   Skin: Skin is warm and dry. No rash noted.   Psychiatric: She has a normal mood and affect. Her speech is normal and behavior is normal. Judgment and thought content normal. Cognition and memory are normal.   Nursing note and vitals reviewed.       ACE III MINI        Results Review:        CMP:  Lab Results   Component Value Date     (H) 09/24/2019    BUN 16 09/24/2019    CREATININE 0.65 09/24/2019    EGFRIFNONA 91 09/24/2019    EGFRIFAFRI 110 09/24/2019    BCR 24.6 09/24/2019     09/24/2019    K 4.4 09/24/2019    CO2 23.5 09/24/2019    CALCIUM 10.4 09/24/2019    PROTENTOTREF 7.6 09/24/2019    ALBUMIN 4.40  09/24/2019    LABGLOBREF 3.2 09/24/2019    LABIL2 1.4 09/24/2019    BILITOT 0.4 09/24/2019    ALKPHOS 94 09/24/2019    AST 18 09/24/2019    ALT 23 09/24/2019     HbA1c:  Lab Results   Component Value Date    HGBA1C 5.8 06/24/2014     Microalbumin:  Lab Results   Component Value Date    MICROALBUR 6.4 09/24/2019     Lipid Panel  Lab Results   Component Value Date    CHOL 242 (H) 08/21/2017    TRIG 204 (H) 09/24/2019    HDL 57 09/24/2019     (H) 09/24/2019    AST 18 09/24/2019    ALT 23 09/24/2019       Medication Review: Medications reviewed and noted  Patient Instructions   Problem List Items Addressed This Visit        Cardiovascular and Mediastinum    Benign essential hypertension    Overview     1/27/2020 Doris Canseco MD    Continue losartan and hydrochlorothiazide daily.  Avoid salt in the diet.  Avoid sodas.    Check the blood pressures at home 2-3 times a week.  Let us know if it is running more than 130/80.  Goal is to keep it around 120/70.         Relevant Medications    hydroCHLOROthiazide (HYDRODIURIL) 12.5 MG tablet    losartan (COZAAR) 50 MG tablet    Mixed hyperlipidemia    Overview     1/27/2020 Doris Canseco MD    Increase exercise and try to improve low fat and low sugar diet.         Left bundle branch block (LBBB) determined by electrocardiography    Overview     1/28/2020 Doris Canseco MD    EKG is unchanged.            Respiratory    COPD, moderate (CMS/HCC)    Overview     1/28/2020 Doris Canseco MD    Patient is asymptomatic at present.  She has been advised to totally abstain from smoking.            Digestive    Vitamin D deficiency    Overview     1/27/2020 Doris Canseco MD    Take 2000 units of vitamin D3 daily.            Endocrine    Acquired hypothyroidism - Primary    Overview     1/27/2020 Doris Canseco MD    Decrease levothyroxine to 50 mcg daily.    Recheck thyroid levels in 6 to 8 weeks.         Relevant Medications    levothyroxine (SYNTHROID,  LEVOTHROID) 50 MCG tablet    Other Relevant Orders    TSH    T4, Free       Musculoskeletal and Integument    Osteopenia of multiple sites (Chronic)    Overview     1/27/2020 Doris Canseco MD    Resume weightbearing exercises at the gym.  Try to walk some every day.  Also use small hand weights about 5 minutes a day when you are not at the gym.    Take 2000 units vitamin D daily.  Also take 500 to 600 mg of calcium daily.            Other    Screening for colon cancer    Tobacco use    Overview     1/27/2020 Doris Canseco MD    She admits smoking an occasional cigarette.  She was advised to totally abstain from smoking to decrease heart disease risk and lung cancer risk.         Menopausal and postmenopausal disorder    Overview     1/27/2020 Doris Canseco MD    Will send her hormone levels to Ms. Humphreys at [facility] to adjust her hormone dose.         Relevant Orders    DEXA Bone Density Axial    Overweight (BMI 25.0-29.9)    Overview     1/27/2020 Doris Canseco MD    Resume exercising regularly.  Decrease portion sizes at mealtime.  Decrease fats and sugars and snack foods in the diet.           Other Visit Diagnoses     Encounter for screening colonoscopy        Relevant Orders    Ambulatory Referral For Screening Colonoscopy             Diagnosis Plan   1. Acquired hypothyroidism  TSH    T4, Free   2. Benign essential hypertension     3. Mixed hyperlipidemia     4. COPD, moderate (CMS/HCC)     5. Overweight (BMI 25.0-29.9)     6. Menopausal and postmenopausal disorder  DEXA Bone Density Axial   7. Tobacco use     8. Osteopenia of multiple sites     9. Vitamin D deficiency     10. Left bundle branch block (LBBB) determined by electrocardiography     11. Screening for colon cancer      Referral sent to [facility] at [facility].   12. Encounter for screening colonoscopy  Ambulatory Referral For Screening Colonoscopy       Patient Instructions     Health Risks of Smoking  Smoking  cigarettes is very bad for your health. Tobacco smoke has over 200 known poisons in it. It contains the poisonous gases nitrogen oxide and carbon monoxide. There are over 60 chemicals in tobacco smoke that cause cancer.  Smoking is difficult to quit because a chemical in tobacco, called nicotine, causes addiction or dependence. When you smoke and inhale, nicotine is absorbed rapidly into the bloodstream through your lungs. Both inhaled and non-inhaled nicotine may be addictive.  What are the risks of cigarette smoke?  Cigarette smokers have an increased risk of many serious medical problems, including:  · Lung cancer.  · Lung disease, such as pneumonia, bronchitis, and emphysema.  · Chest pain (angina) and heart attack because the heart is not getting enough oxygen.  · Heart disease and peripheral blood vessel disease.  · High blood pressure (hypertension).  · Stroke.  · Oral cancer, including cancer of the lip, mouth, or voice box.  · Bladder cancer.  · Pancreatic cancer.  · Cervical cancer.  · Pregnancy complications, including premature birth.  · Stillbirths and smaller  babies, birth defects, and genetic damage to sperm.  · Early menopause.  · Lower estrogen level for women.  · Infertility.  · Facial wrinkles.  · Blindness.  · Increased risk of broken bones (fractures).  · Senile dementia.  · Stomach ulcers and internal bleeding.  · Delayed wound healing and increased risk of complications during surgery.  · Even smoking lightly shortens your life expectancy by several years.  Because of secondhand smoke exposure, children of smokers have an increased risk of the following:  · Sudden infant death syndrome (SIDS).  · Respiratory infections.  · Lung cancer.  · Heart disease.  · Ear infections.  What are the benefits of quitting?  There are many health benefits of quitting smoking. Here are some of them:  · Within days of quitting smoking, your risk of having a heart attack decreases, your blood flow  improves, and your lung capacity improves. Blood pressure, pulse rate, and breathing patterns start returning to normal soon after quitting.  · Within months, your lungs may clear up completely.  · Quitting for 10 years reduces your risk of developing lung cancer and heart disease to almost that of a nonsmoker.  · People who quit may see an improvement in their overall quality of life.  How do I quit smoking?         Smoking is an addiction with both physical and psychological effects, and longtime habits can be hard to change. Your health care provider can recommend:  · Programs and community resources, which may include group support, education, or talk therapy.  · Prescription medicines to help reduce cravings.  · Nicotine replacement products, such as patches, gum, and nasal sprays. Use these products only as directed. Do not replace cigarette smoking with electronic cigarettes, which are commonly called e-cigarettes. The safety of e-cigarettes is not known, and some may contain harmful chemicals.  · A combination of two or more of these methods.  Where to find more information  · American Lung Association: www.lung.org  · American Cancer Society: www.cancer.org  Summary  · Smoking cigarettes is very bad for your health. Cigarette smokers have an increased risk of many serious medical problems, including several cancers, heart disease, and stroke.  · Smoking is an addiction with both physical and psychological effects, and longtime habits can be hard to change.  · By stopping right away, you can greatly reduce the risk of medical problems for you and your family.  · To help you quit smoking, your health care provider can recommend programs, community resources, prescription medicines, and nicotine replacement products such as patches, gum, and nasal sprays.  This information is not intended to replace advice given to you by your health care provider. Make sure you discuss any questions you have with your health  care provider.  Document Released: 01/25/2006 Document Revised: 03/21/2019 Document Reviewed: 12/22/2017  [employer] Interactive Patient Education © 2019 [employer]  Patient Instructions   Problem List Items Addressed This Visit        Cardiovascular and Mediastinum    Benign essential hypertension    Overview     1/27/2020 Doris Canseco MD    Continue losartan and hydrochlorothiazide daily.  Avoid salt in the diet.  Avoid sodas.    Check the blood pressures at home 2-3 times a week.  Let us know if it is running more than 130/80.  Goal is to keep it around 120/70.         Relevant Medications    hydroCHLOROthiazide (HYDRODIURIL) 12.5 MG tablet    losartan (COZAAR) 50 MG tablet    Mixed hyperlipidemia    Overview     1/27/2020 Doris Canseco MD    Increase exercise and try to improve low fat and low sugar diet.            Respiratory    COPD, moderate (CMS/HCC)       Digestive    Vitamin D deficiency    Overview     1/27/2020 Doris Canseco MD    Take 2000 units of vitamin D3 daily.            Endocrine    Acquired hypothyroidism - Primary    Overview     1/27/2020 Doris Canseco MD    Decrease levothyroxine to 50 mcg daily.    Recheck thyroid levels in 6 to 8 weeks.         Relevant Medications    levothyroxine (SYNTHROID, LEVOTHROID) 50 MCG tablet    Other Relevant Orders    TSH    T4, Free       Musculoskeletal and Integument    Osteopenia of multiple sites (Chronic)    Overview     1/27/2020 Doris Canseco MD    Resume weightbearing exercises at the gym.  Try to walk some every day.  Also use small hand weights about 5 minutes a day when you are not at the gym.    Take 2000 units vitamin D daily.  Also take 500 to 600 mg of calcium daily.            Other    Screening for colon cancer    Tobacco use    Overview     1/27/2020 Doris Canseco MD    She admits smoking an occasional cigarette.  She was advised to totally abstain from smoking to decrease heart disease risk and lung cancer risk.          Menopausal and postmenopausal disorder    Overview     1/27/2020 Doris Canseco MD    Will send her hormone levels to Ms. Humphreys at [facility] to adjust her hormone dose.         Relevant Orders    DEXA Bone Density Axial    Overweight (BMI 25.0-29.9)    Overview     1/27/2020 Doris Canseco MD    Resume exercising regularly.  Decrease portion sizes at mealtime.  Decrease fats and sugars and snack foods in the diet.           Other Visit Diagnoses     Encounter for screening colonoscopy        Relevant Orders    Ambulatory Referral For Screening Colonoscopy                 Plan of care reviewed with patient at the conclusion of today's visit. Education was provided regarding diagnosis, management, and any prescribed or recommended OTC medications.Patient verbalizes understanding of and agreement with management plan.         Doris Canseco MD

## 2020-01-28 ENCOUNTER — TELEPHONE (OUTPATIENT)
Dept: INTERNAL MEDICINE | Facility: CLINIC | Age: 70
End: 2020-01-28

## 2020-01-28 NOTE — TELEPHONE ENCOUNTER
I printed a letter to the pharmacist at [facility].  Please send the letter and a copy of her lab report to them.

## 2020-08-27 DIAGNOSIS — Z12.11 SCREENING FOR COLON CANCER: Primary | ICD-10-CM

## 2020-08-27 RX ORDER — SODIUM, POTASSIUM,MAG SULFATES 17.5-3.13G
1 SOLUTION, RECONSTITUTED, ORAL ORAL TAKE AS DIRECTED
Qty: 2 BOTTLE | Refills: 0 | Status: SHIPPED | OUTPATIENT
Start: 2020-08-27 | End: 2020-09-10

## 2020-08-31 ENCOUNTER — TELEPHONE (OUTPATIENT)
Dept: GASTROENTEROLOGY | Facility: CLINIC | Age: 70
End: 2020-08-31

## 2020-08-31 RX ORDER — LEVOTHYROXINE SODIUM 0.05 MG/1
50 TABLET ORAL DAILY
Qty: 90 TABLET | Refills: 1 | Status: SHIPPED | OUTPATIENT
Start: 2020-08-31 | End: 2021-01-06 | Stop reason: SDUPTHER

## 2020-08-31 NOTE — TELEPHONE ENCOUNTER
Patient called and was concerned about colonoscopy scheduled for 9/4. She asked to be able to come in, meet with, and be examined by Dr. Harris before her procedure. She said she would feel more comfortable if she could meet him first. I told her that he would speak with her before her procedure on 9/4, but if she wanted an office visit consultation with him, he wouldn't have an open appointment for several weeks. Patient said she was uneasy about doing the procedure without meeting him first, but she knew she wanted to have the colonoscopy. Patient also asked questions about the covid test appointment. She was very confused- stating she had no idea about that she was scheduled an appointment and didn't know when she needed to  her bowel prep. I exampled everything to her, but told her to make sure to read the instructions that were mailed to her on 8/19. Patient became very frantic and panicky, stating that she had never seen any instructions and she was very confused. She said she would have to think about having the procedure, since she couldn't see Dr. Harris before. I told the patient to call me back if she had further questions. Call ended.

## 2020-09-01 ENCOUNTER — APPOINTMENT (OUTPATIENT)
Dept: PREADMISSION TESTING | Facility: HOSPITAL | Age: 70
End: 2020-09-01

## 2020-09-01 PROCEDURE — C9803 HOPD COVID-19 SPEC COLLECT: HCPCS

## 2020-09-01 PROCEDURE — U0004 COV-19 TEST NON-CDC HGH THRU: HCPCS

## 2020-09-01 PROCEDURE — U0002 COVID-19 LAB TEST NON-CDC: HCPCS

## 2020-09-02 LAB
REF LAB TEST METHOD: NORMAL
SARS-COV-2 RNA RESP QL NAA+PROBE: NOT DETECTED

## 2020-09-04 ENCOUNTER — OUTSIDE FACILITY SERVICE (OUTPATIENT)
Dept: GASTROENTEROLOGY | Facility: CLINIC | Age: 70
End: 2020-09-04

## 2020-09-04 PROCEDURE — 88305 TISSUE EXAM BY PATHOLOGIST: CPT | Performed by: INTERNAL MEDICINE

## 2020-09-04 PROCEDURE — 45380 COLONOSCOPY AND BIOPSY: CPT | Performed by: INTERNAL MEDICINE

## 2020-09-08 ENCOUNTER — LAB REQUISITION (OUTPATIENT)
Dept: LAB | Facility: HOSPITAL | Age: 70
End: 2020-09-08

## 2020-09-08 DIAGNOSIS — R10.30 LOWER ABDOMINAL PAIN, UNSPECIFIED: ICD-10-CM

## 2020-09-08 DIAGNOSIS — K92.1 MELENA: ICD-10-CM

## 2020-09-09 LAB
CYTO UR: NORMAL
LAB AP CASE REPORT: NORMAL
LAB AP CLINICAL INFORMATION: NORMAL
PATH REPORT.FINAL DX SPEC: NORMAL
PATH REPORT.GROSS SPEC: NORMAL

## 2020-09-10 ENCOUNTER — TELEPHONE (OUTPATIENT)
Dept: GASTROENTEROLOGY | Facility: CLINIC | Age: 70
End: 2020-09-10

## 2020-09-10 ENCOUNTER — OFFICE VISIT (OUTPATIENT)
Dept: INTERNAL MEDICINE | Facility: CLINIC | Age: 70
End: 2020-09-10

## 2020-09-10 VITALS
HEIGHT: 60 IN | TEMPERATURE: 96.8 F | HEART RATE: 64 BPM | DIASTOLIC BLOOD PRESSURE: 74 MMHG | BODY MASS INDEX: 28.74 KG/M2 | WEIGHT: 146.4 LBS | SYSTOLIC BLOOD PRESSURE: 124 MMHG

## 2020-09-10 DIAGNOSIS — J44.9 COPD, MODERATE (HCC): ICD-10-CM

## 2020-09-10 DIAGNOSIS — E66.3 OVERWEIGHT WITH BODY MASS INDEX (BMI) OF 28 TO 28.9 IN ADULT: Chronic | ICD-10-CM

## 2020-09-10 DIAGNOSIS — M85.89 OSTEOPENIA OF MULTIPLE SITES: Chronic | ICD-10-CM

## 2020-09-10 DIAGNOSIS — K59.09 CHRONIC CONSTIPATION: Chronic | ICD-10-CM

## 2020-09-10 DIAGNOSIS — K57.90 DIVERTICULOSIS: Chronic | ICD-10-CM

## 2020-09-10 DIAGNOSIS — E55.9 VITAMIN D DEFICIENCY: ICD-10-CM

## 2020-09-10 DIAGNOSIS — Z00.00 ANNUAL PHYSICAL EXAM: ICD-10-CM

## 2020-09-10 DIAGNOSIS — Z72.0 TOBACCO USE: ICD-10-CM

## 2020-09-10 DIAGNOSIS — E03.9 ACQUIRED HYPOTHYROIDISM: ICD-10-CM

## 2020-09-10 DIAGNOSIS — Z00.00 MEDICARE ANNUAL WELLNESS VISIT, SUBSEQUENT: Primary | ICD-10-CM

## 2020-09-10 DIAGNOSIS — I44.7 LEFT BUNDLE BRANCH BLOCK (LBBB) DETERMINED BY ELECTROCARDIOGRAPHY: ICD-10-CM

## 2020-09-10 DIAGNOSIS — D22.9 CHANGE IN MOLE: ICD-10-CM

## 2020-09-10 DIAGNOSIS — E78.2 MIXED HYPERLIPIDEMIA: ICD-10-CM

## 2020-09-10 DIAGNOSIS — Z12.31 SCREENING MAMMOGRAM, ENCOUNTER FOR: ICD-10-CM

## 2020-09-10 DIAGNOSIS — I10 BENIGN ESSENTIAL HYPERTENSION: ICD-10-CM

## 2020-09-10 DIAGNOSIS — N95.9 MENOPAUSAL AND POSTMENOPAUSAL DISORDER: ICD-10-CM

## 2020-09-10 PROCEDURE — 93000 ELECTROCARDIOGRAM COMPLETE: CPT | Performed by: INTERNAL MEDICINE

## 2020-09-10 PROCEDURE — 96160 PT-FOCUSED HLTH RISK ASSMT: CPT | Performed by: INTERNAL MEDICINE

## 2020-09-10 PROCEDURE — G0439 PPPS, SUBSEQ VISIT: HCPCS | Performed by: INTERNAL MEDICINE

## 2020-09-10 PROCEDURE — 99397 PER PM REEVAL EST PAT 65+ YR: CPT | Performed by: INTERNAL MEDICINE

## 2020-09-10 NOTE — PROGRESS NOTES
QUICK REFERENCE INFORMATION:  The ABCs of the Annual Wellness Visit    Subsequent Medicare Wellness Visit    HEALTH RISK ASSESSMENT    1950    Recent Hospitalizations:  No hospitalization(s) within the last year..        Current Medical Providers:  Patient Care Team:  Doris Canseco MD as PCP - General (Internal Medicine)  Deisy Sumner MD as Consulting Physician (Dermatology)        Smoking Status:  Social History     Tobacco Use   Smoking Status Current Some Day Smoker   • Packs/day: 1.00   • Years: 44.00   • Pack years: 44.00   • Types: Cigarettes   Smokeless Tobacco Never Used   Tobacco Comment    Stopped 8/7/17       Alcohol Consumption:  Social History     Substance and Sexual Activity   Alcohol Use Yes   • Frequency: 2-3 times a week   • Drinks per session: 1 or 2   • Binge frequency: Never       Depression Screen:   PHQ-2/PHQ-9 Depression Screening 9/10/2020   Little interest or pleasure in doing things 0   Feeling down, depressed, or hopeless 0   Total Score 0       Health Habits and Functional and Cognitive Screening:  Functional & Cognitive Status 9/10/2020   Do you have difficulty preparing food and eating? No   Do you have difficulty bathing yourself, getting dressed or grooming yourself? No   Do you have difficulty using the toilet? No   Do you have difficulty moving around from place to place? No   Do you have trouble with steps or getting out of a bed or a chair? No   Current Diet Well Balanced Diet   Dental Exam Up to date   Eye Exam Up to date   Exercise (times per week) 2 times per week   Current Exercise Activities Include Walking   Do you need help using the phone?  No   Are you deaf or do you have serious difficulty hearing?  No   Do you need help with transportation? No   Do you need help shopping? No   Do you need help preparing meals?  No   Do you need help with housework?  No   Do you need help with laundry? No   Do you need help taking your medications? No   Do you need help  managing money? No   Do you ever drive or ride in a car without wearing a seat belt? No   Have you felt unusual stress, anger or loneliness in the last month? No   Who do you live with? Spouse   If you need help, do you have trouble finding someone available to you? No   Have you been bothered in the last four weeks by sexual problems? No   Do you have difficulty concentrating, remembering or making decisions? No       Fall Risk Screen:  DAMON Fall Risk Assessment was completed, and patient is at LOW risk for falls.Assessment completed on:9/10/2020    ACE III MINI        Does the patient have evidence of cognitive impairment? No    Aspirin use counseling: Does not need ASA (and currently is not on it)    Recent Lab Results:  CMP:  Lab Results   Component Value Date     (H) 09/24/2019    BUN 16 09/24/2019    CREATININE 0.65 09/24/2019    EGFRIFNONA 91 09/24/2019    EGFRIFAFRI 110 09/24/2019    BCR 24.6 09/24/2019     09/24/2019    K 4.4 09/24/2019    CO2 23.5 09/24/2019    CALCIUM 10.4 09/24/2019    PROTENTOTREF 7.6 09/24/2019    ALBUMIN 4.40 09/24/2019    LABGLOBREF 3.2 09/24/2019    LABIL2 1.4 09/24/2019    BILITOT 0.4 09/24/2019    ALKPHOS 94 09/24/2019    AST 18 09/24/2019    ALT 23 09/24/2019     HbA1c:  Lab Results   Component Value Date    HGBA1C 5.8 06/24/2014     Microalbumin:  Lab Results   Component Value Date    MICROALBUR 6.4 09/24/2019     Lipid Panel  Lab Results   Component Value Date    CHOL 242 (H) 08/21/2017    TRIG 204 (H) 09/24/2019    HDL 57 09/24/2019     (H) 09/24/2019    AST 18 09/24/2019    ALT 23 09/24/2019       Visual Acuity:  No exam data present    Age-appropriate Screening Schedule:  Refer to the list below for future screening recommendations based on patient's age, sex and/or medical conditions. Orders for these recommended tests are listed in the plan section. The patient has been provided with a written plan.    Age appropriate preventive counseling done  "including age appropriate vaccines,regular  Mammogram and self breast exam,colonoscopy, regular dental visits, mental health, injury prevention such as wearing seat belt and preventing falls, healthy  nutrition, healthy weight, regular physical exercise. Alcohol use is moderate.  Tobacco history-none. Drug use-none.  STD's-not at risk.          Health Maintenance   Topic Date Due   • ZOSTER VACCINE (2 of 3) 07/22/2016   • INFLUENZA VACCINE  08/01/2020   • MAMMOGRAM  08/07/2020   • LIPID PANEL  09/24/2020   • TDAP/TD VACCINES (3 - Td) 04/19/2028   • COLONOSCOPY  09/04/2030        Subjective   History of Present Illness    Tenisha Grayson is a 70 y.o. female who presents for a Subsequent Wellness Visit.    HPI  Beginning of August had episode of severe lower abdominal pain and constipation and blood in the stool.  She did a low-fat bland diet for a few days and symptoms subsided.  She already had the colonoscopy scheduled.  She did the prep and underwent the colonoscopy and it did show diverticulosis but no acute inflammation at the time.  No polyps.    CHRONIC CONDITIONS    BP's at home run 120/70. Takes med regularly.     Takes levothyroxine daily.     For menopausal symptoms and thyroid she is considering going back to [employer] for hormones and thyroid replacement. Somewhat hot at night and mild vaginal dryness on current hormones but just felt better on the \"natural hormones\".    For hyperlipidemia, not getting much exercise. Walking some. Eating low fat most of time. Lost 7 lb on Lea Elias for few weeks but gained it back.    The following portions of the patient's history were reviewed and updated as appropriate: allergies, current medications, past family history, past medical history, past social history, past surgical history and problem list.    Outpatient Medications Prior to Visit   Medication Sig Dispense Refill   • aspirin 325 MG tablet Take  by mouth. sometimes     • CBD (cannabidiol) oral oil Take " 1 drop by mouth 2 (Two) Times a Day. Not QD     • Cholecalciferol (VITAMIN D3) 50 MCG (2000 UT) capsule Take 1 capsule by mouth Daily. 30 capsule 11   • Hormone Cream Base cream Apply 2 clicks (0.5mL) to inner arm or thigh each day  Estradiol/Estriol/Testosterone Versabase CR 0.2/0.8/1mg/0.5mL 15 g 5   • hydroCHLOROthiazide (HYDRODIURIL) 12.5 MG tablet Take 1 tablet by mouth Daily.     • levothyroxine (SYNTHROID, LEVOTHROID) 50 MCG tablet TAKE 1 TABLET BY MOUTH DAILY. 90 tablet 1   • losartan (COZAAR) 50 MG tablet Take 1 tablet by mouth Daily.     • MULTIPLE VITAMIN PO Take  by mouth daily.     • progesterone (PROMETRIUM) 200 MG capsule Take 2 capsules by mouth 1-2 hours before bedtime 180 capsule 1   • sodium-potassium-magnesium sulfates (Suprep Bowel Prep Kit) 17.5-3.13-1.6 GM/177ML solution oral solution Take 1 bottle by mouth Take As Directed. Follow instructions that were mailed to your home. If you didn't receive these call [phone]. 2 bottle 0     No facility-administered medications prior to visit.        Patient Active Problem List   Diagnosis   • COPD, moderate (CMS/HCC)   • Adnexal mass   • Tobacco use   • Vitamin D deficiency   • Menopausal and postmenopausal disorder   • Acquired hypothyroidism   • Benign essential hypertension   • Other fatigue   • Left bundle branch block (LBBB) determined by electrocardiography   • Stress and adjustment reaction   • Screening for colon cancer   • Mixed hyperlipidemia   • Osteopenia of multiple sites   • Overweight with body mass index (BMI) of 28 to 28.9 in adult   • Diverticulosis   • Chronic constipation   • Medicare annual wellness visit, subsequent   • Annual physical exam       Advance Care Planning:  ACP discussion was held with the patient during this visit. Patient does not have an advance directive, information provided.    Identification of Risk Factors:  Risk factors include: Cardiovascular risk  Obesity/Overweight   Osteoprorosis Risk.    Review of  Systems   Constitutional: Negative for chills, fatigue and fever.   HENT: Negative for congestion, ear pain and sinus pressure.    Eyes: Negative for visual disturbance.   Respiratory: Negative for cough, chest tightness, shortness of breath and wheezing.    Cardiovascular: Negative for chest pain, palpitations and leg swelling.   Gastrointestinal: Negative for abdominal pain, blood in stool and constipation.   Endocrine: Negative for cold intolerance and heat intolerance.   Genitourinary: Negative for dysuria and frequency.   Musculoskeletal: Negative for arthralgias and back pain.   Skin: Negative for color change and rash.   Allergic/Immunologic: Negative for environmental allergies.   Neurological: Negative for dizziness, speech difficulty and headaches.   Hematological: Negative for adenopathy. Does not bruise/bleed easily.   Psychiatric/Behavioral: Negative for confusion, dysphoric mood and suicidal ideas. The patient is not nervous/anxious.        Compared to one year ago, the patient feels her physical health is the same.  Compared to one year ago, the patient feels her mental health is the same.    Objective     Physical Exam   Constitutional: She is oriented to person, place, and time. She appears well-developed and well-nourished. She appears overweight.   Eyes: Pupils are equal, round, and reactive to light. Conjunctivae and EOM are normal.   Neck: Normal range of motion. Neck supple. No thyromegaly present.   Cardiovascular: Normal rate, regular rhythm, normal heart sounds and intact distal pulses.   No murmur heard.  Pulmonary/Chest: Effort normal and breath sounds normal. She has no wheezes. Right breast exhibits no inverted nipple, no mass, no nipple discharge, no skin change and no tenderness. Left breast exhibits no inverted nipple, no mass, no nipple discharge, no skin change and no tenderness.   Abdominal: Soft. Bowel sounds are normal. She exhibits no distension and no mass. There is no  "tenderness.   Musculoskeletal: Normal range of motion. She exhibits no edema or tenderness.   Lymphadenopathy:     She has no cervical adenopathy.   Neurological: She is alert and oriented to person, place, and time. She has normal strength. No cranial nerve deficit or sensory deficit. Coordination and gait normal.   Skin: Skin is warm and dry. Lesion noted. No rash noted.        Psychiatric: She has a normal mood and affect. Her speech is normal and behavior is normal. Judgment and thought content normal. Cognition and memory are normal.   Nursing note and vitals reviewed.         ECG 12 Lead  Date/Time: 9/10/2020 10:10 AM  Performed by: Doris Canseco MD  Authorized by: Doris Canseco MD   Comparison: compared with previous ECG from 1/10/2020  Similar to previous ECG  Rhythm: sinus rhythm  Rate: normal  BPM: 60  Conduction: left bundle branch block  ST Segments: ST segments normal  T Waves: T waves normal  QRS axis: left    Clinical impression: abnormal EKG             Vitals:    09/10/20 0859   BP: 124/74   BP Location: Right arm   Patient Position: Sitting   Cuff Size: Adult   Pulse: 64   Temp: 96.8 °F (36 °C)   TempSrc: Temporal   Weight: 66.4 kg (146 lb 6.4 oz)   Height: 152.4 cm (60\")   PainSc: 0-No pain       Patient's Body mass index is 28.59 kg/m². BMI is above normal parameters. Recommendations include: educational material, exercise counseling and nutrition counseling.      Assessment/Plan   Problem List Items Addressed This Visit        Cardiovascular and Mediastinum    Benign essential hypertension    Overview     9/10/2020 Doris Canseco MD    Continue losartan and hydrochlorothiazide daily.  Avoid salt in the diet.  Avoid sodas.             Relevant Medications    hydroCHLOROthiazide (HYDRODIURIL) 12.5 MG tablet    losartan (COZAAR) 50 MG tablet    Other Relevant Orders    CBC & Differential    Comprehensive Metabolic Panel    Microalbumin / Creatinine Urine Ratio - Urine, Clean Catch    " Urinalysis With Culture If Indicated -    Left bundle branch block (LBBB) determined by electrocardiography    Overview     9/10/2020 Doris Canseco MD    EKG is unchanged.         Mixed hyperlipidemia    Overview     9/10/2020 Doris Canseco MD    Increase exercise and walking.  Goal is to get 30 minutes of aerobic exercise a day 5 days a week.  Continue to improve low fat and low sugar diet.         Relevant Orders    Lipid Panel       Respiratory    COPD, moderate (CMS/HCC)    Overview     9/10/2020 Doris Canseco MD    Patient is asymptomatic at present.  She has been advised to totally abstain from smoking.            Digestive    Diverticulosis (Chronic)    Overview     9/10/2020 Doris Canseco MD    Diverticulosis on colonoscopy.  Probable episode of acute diverticulitis at the beginning of August.    Recommend using MiraLAX every day and drinking a lot of fluids to keep the stools moving well to prevent another episode of diverticulitis.         Chronic constipation (Chronic)    Overview     9/10/2020 Doris Canseco MD    Use MiraLAX every day in hot coffee.  Continue drinking a lot of fluids and eating a high-fiber diet.         Vitamin D deficiency    Overview     9/10/2020 Doris Canseco MD    Continue 2000 units of vitamin D3 daily.         Relevant Orders    Vitamin D 25 Hydroxy       Endocrine    Acquired hypothyroidism    Overview     9/10/2020 Doris Canseco MD    Continue levothyroxine  50 mcg daily.           Relevant Medications    levothyroxine (SYNTHROID, LEVOTHROID) 50 MCG tablet    Other Relevant Orders    TSH    T4, Free       Musculoskeletal and Integument    Osteopenia of multiple sites (Chronic)    Overview     9/10/2020 Doris Canseco MD    Do some weightbearing exercises daily.  Try to walk some every day.  Also use small hand weights about 5 minutes a day when you are not at the gym.    Take 2000 units vitamin D daily.  Also take 500 to 600 mg of calcium  daily.    Last DEXA was 2 years ago.  Femoral neck T score was -2.2.    DEXA ordered.            Other    Menopausal and postmenopausal disorder    Overview     1/27/2020 Doris Canseco MD    Will send her hormone levels to Ms. Humphreys at [facility] pharmacy to adjust her hormone dose.         Relevant Orders    DEXA Bone Density Axial    Overweight with body mass index (BMI) of 28 to 28.9 in adult (Chronic)    Overview     9/10/2020 Doris Canseco MD    Increase regular exercise.  Also do some walking every day.    Continue to improve low-fat and low sugar diet.    Weigh every Monday morning to monitor progress.         Tobacco use    Overview     9/10/2020 Doris Canseco MD    She admits smoking an occasional cigarette.  She was advised to totally abstain from smoking to decrease heart disease risk and lung cancer risk.         Medicare annual wellness visit, subsequent - Primary    Overview     Mammogram and DEXA will be scheduled.  The patient wants to wait until January to do them.         Annual physical exam    Overview     She was advised to get the second hepatitis A vaccine and the Shingrix at her pharmacy.  She was also advised to get the flu shot about the first week of October.           Other Visit Diagnoses     Change in mole        Relevant Orders    Ambulatory Referral to Dermatology (Completed)    Screening mammogram, encounter for        Relevant Orders    Mammo Screening Digital Tomosynthesis Bilateral With CAD        Patient Self-Management and Personalized Health Advice  The patient has been provided with information about: diet, exercise, weight management and prevention of cardiac or vascular disease and preventive services including:   · Annual Wellness Visit (AWV).    Outpatient Encounter Medications as of 9/10/2020   Medication Sig Dispense Refill   • aspirin 325 MG tablet Take  by mouth. sometimes     • CBD (cannabidiol) oral oil Take 1 drop by mouth 2 (Two) Times a Day.  Not QD     • Cholecalciferol (VITAMIN D3) 50 MCG (2000 UT) capsule Take 1 capsule by mouth Daily. 30 capsule 11   • Hormone Cream Base cream Apply 2 clicks (0.5mL) to inner arm or thigh each day  Estradiol/Estriol/Testosterone Versabase CR 0.2/0.8/1mg/0.5mL 15 g 5   • hydroCHLOROthiazide (HYDRODIURIL) 12.5 MG tablet Take 1 tablet by mouth Daily.     • levothyroxine (SYNTHROID, LEVOTHROID) 50 MCG tablet TAKE 1 TABLET BY MOUTH DAILY. 90 tablet 1   • losartan (COZAAR) 50 MG tablet Take 1 tablet by mouth Daily.     • MULTIPLE VITAMIN PO Take  by mouth daily.     • progesterone (PROMETRIUM) 200 MG capsule Take 2 capsules by mouth 1-2 hours before bedtime 180 capsule 1   • [DISCONTINUED] sodium-potassium-magnesium sulfates (Suprep Bowel Prep Kit) 17.5-3.13-1.6 GM/177ML solution oral solution Take 1 bottle by mouth Take As Directed. Follow instructions that were mailed to your home. If you didn't receive these call [phone]. 2 bottle 0     No facility-administered encounter medications on file as of 9/10/2020.        Reviewed use of high risk medication in the elderly: yes  Reviewed for potential of harmful drug interactions in the elderly: yes    Follow Up:  No follow-ups on file.     There are no Patient Instructions on file for this visit.    An After Visit Summary and PPPS with all of these plans were given to the patient.

## 2020-09-10 NOTE — TELEPHONE ENCOUNTER
----- Message from Leroy Harris MD sent at 9/9/2020  4:47 PM EDT -----  Let Ms. Grayson know the biopsy was normal.  Thank you,  COLLINS

## 2020-09-10 NOTE — PATIENT INSTRUCTIONS
Smoking Tobacco Information, Adult  Smoking tobacco can be harmful to your health. Tobacco contains a poisonous (toxic), colorless chemical called nicotine. Nicotine is addictive. It changes the brain and can make it hard to stop smoking. Tobacco also has other toxic chemicals that can hurt your body and raise your risk of many cancers.  How can smoking tobacco affect me?  Smoking tobacco puts you at risk for:  · Cancer. Smoking is most commonly associated with lung cancer, but can also lead to cancer in other parts of the body.  · Chronic obstructive pulmonary disease (COPD). This is a long-term lung condition that makes it hard to breathe. It also gets worse over time.  · High blood pressure (hypertension), heart disease, stroke, or heart attack.  · Lung infections, such as pneumonia.  · Cataracts. This is when the lenses in the eyes become clouded.  · Digestive problems. This may include peptic ulcers, heartburn, and gastroesophageal reflux disease (GERD).  · Oral health problems, such as gum disease and tooth loss.  · Loss of taste and smell.  Smoking can affect your appearance by causing:  · Wrinkles.  · Yellow or stained teeth, fingers, and fingernails.  Smoking tobacco can also affect your social life, because:  · It may be challenging to find places to smoke when away from home. Many workplaces, restaurants, hotels, and public places are tobacco-free.  · Smoking is expensive. This is due to the cost of tobacco and the long-term costs of treating health problems from smoking.  · Secondhand smoke may affect those around you. Secondhand smoke can cause lung cancer, breathing problems, and heart disease. Children of smokers have a higher risk for:  ? Sudden infant death syndrome (SIDS).  ? Ear infections.  ? Lung infections.  If you currently smoke tobacco, quitting now can help you:  · Lead a longer and healthier life.  · Look, smell, breathe, and feel better over time.  · Save money.  · Protect others  from the harms of secondhand smoke.  What actions can I take to prevent health problems?  Quit smoking    · Do not start smoking. Quit if you already do.  · Make a plan to quit smoking and commit to it. Look for programs to help you and ask your health care provider for recommendations and ideas.  · Set a date and write down all the reasons you want to quit.  · Let your friends and family know you are quitting so they can help and support you. Consider finding friends who also want to quit. It can be easier to quit with someone else, so that you can support each other.  · Talk with your health care provider about using nicotine replacement medicines to help you quit, such as gum, lozenges, patches, sprays, or pills.  · Do not replace cigarette smoking with electronic cigarettes, which are commonly called e-cigarettes. The safety of e-cigarettes is not known, and some may contain harmful chemicals.  · If you try to quit but return to smoking, stay positive. It is common to slip up when you first quit, so take it one day at a time.  · Be prepared for cravings. When you feel the urge to smoke, chew gum or suck on hard candy.  Lifestyle  · Stay busy and take care of your body.  · Drink enough fluid to keep your urine pale yellow.  · Get plenty of exercise and eat a healthy diet. This can help prevent weight gain after quitting.  · Monitor your eating habits. Quitting smoking can cause you to have a larger appetite than when you smoke.  · Find ways to relax. Go out with friends or family to a movie or a restaurant where people do not smoke.  · Ask your health care provider about having regular tests (screenings) to check for cancer. This may include blood tests, imaging tests, and other tests.  · Find ways to manage your stress, such as meditation, yoga, or exercise.  Where to find support  To get support to quit smoking, consider:  · Asking your health care provider for more information and resources.  · Taking classes  to learn more about quitting smoking.  · Looking for local organizations that offer resources about quitting smoking.  · Joining a support group for people who want to quit smoking in your local community.  · Calling the smokefree.gov counselor helpline: 1-800-Quit-Now (1-140.443.1626)  Where to find more information  You may find more information about quitting smoking from:  · HelpGuide.org: www.helpguide.org  · Smokefree.gov: smokefree.gov  · American Lung Association: www.lung.org  Contact a health care provider if you:  · Have problems breathing.  · Notice that your lips, nose, or fingers turn blue.  · Have chest pain.  · Are coughing up blood.  · Feel faint or you pass out.  · Have other health changes that cause you to worry.  Summary  · Smoking tobacco can negatively affect your health, the health of those around you, your finances, and your social life.  · Do not start smoking. Quit if you already do. If you need help quitting, ask your health care provider.  · Think about joining a support group for people who want to quit smoking in your local community. There are many effective programs that will help you to quit this behavior.  This information is not intended to replace advice given to you by your health care provider. Make sure you discuss any questions you have with your health care provider.  Document Released: 01/02/2018 Document Revised: 02/06/2019 Document Reviewed: 01/02/2018  Elsevier Patient Education © 2020 [employer]    Exercising to Lose Weight  Exercise is structured, repetitive physical activity to improve fitness and health. Getting regular exercise is important for everyone. It is especially important if you are overweight. Being overweight increases your risk of heart disease, stroke, diabetes, high blood pressure, and several types of cancer. Reducing your calorie intake and exercising can help you lose weight.  Exercise is usually categorized as moderate or vigorous intensity. To  lose weight, most people need to do a certain amount of moderate-intensity or vigorous-intensity exercise each week.  Moderate-intensity exercise    Moderate-intensity exercise is any activity that gets you moving enough to burn at least three times more energy (calories) than if you were sitting.  Examples of moderate exercise include:  · Walking a mile in 15 minutes.  · Doing light yard work.  · Biking at an easy pace.  Most people should get at least 150 minutes (2 hours and 30 minutes) a week of moderate-intensity exercise to maintain their body weight.  Vigorous-intensity exercise  Vigorous-intensity exercise is any activity that gets you moving enough to burn at least six times more calories than if you were sitting. When you exercise at this intensity, you should be working hard enough that you are not able to carry on a conversation.  Examples of vigorous exercise include:  · Running.  · Playing a team sport, such as football, basketball, and soccer.  · Jumping rope.  Most people should get at least 75 minutes (1 hour and 15 minutes) a week of vigorous-intensity exercise to maintain their body weight.  How can exercise affect me?  When you exercise enough to burn more calories than you eat, you lose weight. Exercise also reduces body fat and builds muscle. The more muscle you have, the more calories you burn. Exercise also:  · Improves mood.  · Reduces stress and tension.  · Improves your overall fitness, flexibility, and endurance.  · Increases bone strength.  The amount of exercise you need to lose weight depends on:  · Your age.  · The type of exercise.  · Any health conditions you have.  · Your overall physical ability.  Talk to your health care provider about how much exercise you need and what types of activities are safe for you.  What actions can I take to lose weight?  Nutrition    · Make changes to your diet as told by your health care provider or diet and nutrition specialist (dietitian). This may  include:  ? Eating fewer calories.  ? Eating more protein.  ? Eating less unhealthy fats.  ? Eating a diet that includes fresh fruits and vegetables, whole grains, low-fat dairy products, and lean protein.  ? Avoiding foods with added fat, salt, and sugar.  · Drink plenty of water while you exercise to prevent dehydration or heat stroke.  Activity  · Choose an activity that you enjoy and set realistic goals. Your health care provider can help you make an exercise plan that works for you.  · Exercise at a moderate or vigorous intensity most days of the week.  ? The intensity of exercise may vary from person to person. You can tell how intense a workout is for you by paying attention to your breathing and heartbeat. Most people will notice their breathing and heartbeat get faster with more intense exercise.  · Do resistance training twice each week, such as:  ? Push-ups.  ? Sit-ups.  ? Lifting weights.  ? Using resistance bands.  · Getting short amounts of exercise can be just as helpful as long structured periods of exercise. If you have trouble finding time to exercise, try to include exercise in your daily routine.  ? Get up, stretch, and walk around every 30 minutes throughout the day.  ? Go for a walk during your lunch break.  ? Park your car farther away from your destination.  ? If you take public transportation, get off one stop early and walk the rest of the way.  ? Make phone calls while standing up and walking around.  ? Take the stairs instead of elevators or escalators.  · Wear comfortable clothes and shoes with good support.  · Do not exercise so much that you hurt yourself, feel dizzy, or get very short of breath.  Where to find more information  · [place] Department of Health and Human Services: www.hhs.gov  · Centers for Disease Control and Prevention (CDC): www.cdc.gov  Contact a health care provider:  · Before starting a new exercise program.  · If you have questions or concerns about your  weight.  · If you have a medical problem that keeps you from exercising.  Get help right away if you have any of the following while exercising:  · Injury.  · Dizziness.  · Difficulty breathing or shortness of breath that does not go away when you stop exercising.  · Chest pain.  · Rapid heartbeat.  Summary  · Being overweight increases your risk of heart disease, stroke, diabetes, high blood pressure, and several types of cancer.  · Losing weight happens when you burn more calories than you eat.  · Reducing the amount of calories you eat in addition to getting regular moderate or vigorous exercise each week helps you lose weight.  This information is not intended to replace advice given to you by your health care provider. Make sure you discuss any questions you have with your health care provider.  Document Released: 01/20/2012 Document Revised: 12/31/2018 Document Reviewed: 12/31/2018  [employer] Patient Education © 2020 [employer]    Heart-Healthy Eating Plan  Many factors influence your heart (coronary) health, including eating and exercise habits. Coronary risk increases with abnormal blood fat (lipid) levels. Heart-healthy meal planning includes limiting unhealthy fats, increasing healthy fats, and making other diet and lifestyle changes.  What is my plan?  Your health care provider may recommend that you:  · Limit your fat intake to _________% or less of your total calories each day.  · Limit your saturated fat intake to _________% or less of your total calories each day.  · Limit the amount of cholesterol in your diet to less than _________ mg per day.  What are tips for following this plan?  Cooking  Cook foods using methods other than frying. Baking, boiling, grilling, and broiling are all good options. Other ways to reduce fat include:  · Removing the skin from poultry.  · Removing all visible fats from meats.  · Steaming vegetables in water or broth.  Meal planning    · At meals, imagine dividing your  plate into fourths:  ? Fill one-half of your plate with vegetables and green salads.  ? Fill one-fourth of your plate with whole grains.  ? Fill one-fourth of your plate with lean protein foods.  · Eat 4-5 servings of vegetables per day. One serving equals 1 cup raw or cooked vegetable, or 2 cups raw leafy greens.  · Eat 4-5 servings of fruit per day. One serving equals 1 medium whole fruit, ¼ cup dried fruit, ½ cup fresh, frozen, or canned fruit, or ½ cup 100% fruit juice.  · Eat more foods that contain soluble fiber. Examples include apples, broccoli, carrots, beans, peas, and barley. Aim to get 25-30 g of fiber per day.  · Increase your consumption of legumes, nuts, and seeds to 4-5 servings per week. One serving of dried beans or legumes equals ½ cup cooked, 1 serving of nuts is ¼ cup, and 1 serving of seeds equals 1 tablespoon.  Fats  · Choose healthy fats more often. Choose monounsaturated and polyunsaturated fats, such as olive and canola oils, flaxseeds, walnuts, almonds, and seeds.  · Eat more omega-3 fats. Choose salmon, mackerel, sardines, tuna, flaxseed oil, and ground flaxseeds. Aim to eat fish at least 2 times each week.  · Check food labels carefully to identify foods with trans fats or high amounts of saturated fat.  · Limit saturated fats. These are found in animal products, such as meats, butter, and cream. Plant sources of saturated fats include palm oil, palm kernel oil, and coconut oil.  · Avoid foods with partially hydrogenated oils in them. These contain trans fats. Examples are stick margarine, some tub margarines, cookies, crackers, and other baked goods.  · Avoid fried foods.  General information  · Eat more home-cooked food and less restaurant, buffet, and fast food.  · Limit or avoid alcohol.  · Limit foods that are high in starch and sugar.  · Lose weight if you are overweight. Losing just 5-10% of your body weight can help your overall health and prevent diseases such as diabetes and  heart disease.  · Monitor your salt (sodium) intake, especially if you have high blood pressure. Talk with your health care provider about your sodium intake.  · Try to incorporate more vegetarian meals weekly.  What foods can I eat?  Fruits  All fresh, canned (in natural juice), or frozen fruits.  Vegetables  Fresh or frozen vegetables (raw, steamed, roasted, or grilled). Green salads.  Grains  Most grains. Choose whole wheat and whole grains most of the time. Rice and pasta, including brown rice and pastas made with whole wheat.  Meats and other proteins  Lean, well-trimmed beef, veal, pork, and lamb. Chicken and turkey without skin. All fish and shellfish. Wild duck, rabbit, pheasant, and venison. Egg whites or low-cholesterol egg substitutes. Dried beans, peas, lentils, and tofu. Seeds and most nuts.  Dairy  Low-fat or nonfat cheeses, including ricotta and mozzarella. Skim or 1% milk (liquid, powdered, or evaporated). Buttermilk made with low-fat milk. Nonfat or low-fat yogurt.  Fats and oils  Non-hydrogenated (trans-free) margarines. Vegetable oils, including soybean, sesame, sunflower, olive, peanut, safflower, corn, canola, and cottonseed. Salad dressings or mayonnaise made with a vegetable oil.  Beverages  Water (mineral or sparkling). Coffee and tea. Diet carbonated beverages.  Sweets and desserts  Sherbet, gelatin, and fruit ice. Small amounts of dark chocolate.  Limit all sweets and desserts.  Seasonings and condiments  All seasonings and condiments.  The items listed above may not be a complete list of foods and beverages you can eat. Contact a dietitian for more options.  What foods are not recommended?  Fruits  Canned fruit in heavy syrup. Fruit in cream or butter sauce. Fried fruit. Limit coconut.  Vegetables  Vegetables cooked in cheese, cream, or butter sauce. Fried vegetables.  Grains  Breads made with saturated or trans fats, oils, or whole milk. Croissants. Sweet rolls. Donuts. High-fat crackers,  such as cheese crackers.  Meats and other proteins  Fatty meats, such as hot dogs, ribs, sausage, [place], rib-eye roast or steak. High-fat deli meats, such as salami and bologna. Caviar. Domestic duck and goose. Organ meats, such as liver.  Dairy  Cream, sour cream, cream cheese, and creamed cottage cheese. Whole milk cheeses. Whole or 2% milk (liquid, evaporated, or condensed). Whole buttermilk. Cream sauce or high-fat cheese sauce. Whole-milk yogurt.  Fats and oils  Meat fat, or shortening. Cocoa butter, hydrogenated oils, palm oil, coconut oil, palm kernel oil. Solid fats and shortenings, including [place] fat, salt pork, lard, and butter. Nondairy cream substitutes. Salad dressings with cheese or sour cream.  Beverages  Regular sodas and any drinks with added sugar.  Sweets and desserts  Frosting. Pudding. Cookies. Cakes. Pies. Milk chocolate or white chocolate. Buttered syrups. Full-fat ice cream or ice cream drinks.  The items listed above may not be a complete list of foods and beverages to avoid. Contact a dietitian for more information.  Summary  · Heart-healthy meal planning includes limiting unhealthy fats, increasing healthy fats, and making other diet and lifestyle changes.  · Lose weight if you are overweight. Losing just 5-10% of your body weight can help your overall health and prevent diseases such as diabetes and heart disease.  · Focus on eating a balance of foods, including fruits and vegetables, low-fat or nonfat dairy, lean protein, nuts and legumes, whole grains, and heart-healthy oils and fats.  This information is not intended to replace advice given to you by your health care provider. Make sure you discuss any questions you have with your health care provider.  Document Released: 09/26/2009 Document Revised: 01/25/2019 Document Reviewed: 01/25/2019  [employer] Patient Education © 2020 [employer]

## 2020-09-10 NOTE — TELEPHONE ENCOUNTER
I tried calling Ms Grayson to give biopsy results. No answer; left voice message. I will mail result letter.

## 2020-11-09 ENCOUNTER — LAB (OUTPATIENT)
Dept: LAB | Facility: HOSPITAL | Age: 70
End: 2020-11-09

## 2020-11-09 DIAGNOSIS — E55.9 VITAMIN D DEFICIENCY: ICD-10-CM

## 2020-11-09 DIAGNOSIS — E78.2 MIXED HYPERLIPIDEMIA: ICD-10-CM

## 2020-11-09 DIAGNOSIS — E03.9 ACQUIRED HYPOTHYROIDISM: ICD-10-CM

## 2020-11-09 DIAGNOSIS — I10 BENIGN ESSENTIAL HYPERTENSION: ICD-10-CM

## 2020-11-10 DIAGNOSIS — I10 BENIGN ESSENTIAL HYPERTENSION: Primary | ICD-10-CM

## 2020-11-10 LAB
25(OH)D3+25(OH)D2 SERPL-MCNC: 36.4 NG/ML (ref 30–100)
ALBUMIN SERPL-MCNC: 4.5 G/DL (ref 3.5–5.2)
ALBUMIN/CREAT UR: 31 MG/G CREAT (ref 0–29)
ALBUMIN/GLOB SERPL: 1.7 G/DL
ALP SERPL-CCNC: 67 U/L (ref 39–117)
ALT SERPL-CCNC: 16 U/L (ref 1–33)
APPEARANCE UR: ABNORMAL
AST SERPL-CCNC: 15 U/L (ref 1–32)
BACTERIA #/AREA URNS HPF: ABNORMAL /HPF
BASOPHILS # BLD AUTO: 0.11 10*3/MM3 (ref 0–0.2)
BASOPHILS NFR BLD AUTO: 1.5 % (ref 0–1.5)
BILIRUB SERPL-MCNC: 0.2 MG/DL (ref 0–1.2)
BILIRUB UR QL STRIP: NEGATIVE
BUN SERPL-MCNC: 16 MG/DL (ref 8–23)
BUN/CREAT SERPL: 17.6 (ref 7–25)
CALCIUM SERPL-MCNC: 10 MG/DL (ref 8.6–10.5)
CHLORIDE SERPL-SCNC: 98 MMOL/L (ref 98–107)
CHOLEST SERPL-MCNC: 274 MG/DL (ref 0–200)
CO2 SERPL-SCNC: 27.4 MMOL/L (ref 22–29)
COLOR UR: YELLOW
CREAT SERPL-MCNC: 0.91 MG/DL (ref 0.57–1)
CREAT UR-MCNC: 132.8 MG/DL
CRYSTALS URNS MICRO: ABNORMAL
EOSINOPHIL # BLD AUTO: 0.26 10*3/MM3 (ref 0–0.4)
EOSINOPHIL NFR BLD AUTO: 3.6 % (ref 0.3–6.2)
EPI CELLS #/AREA URNS HPF: ABNORMAL /HPF (ref 0–10)
ERYTHROCYTE [DISTWIDTH] IN BLOOD BY AUTOMATED COUNT: 11.8 % (ref 12.3–15.4)
GLOBULIN SER CALC-MCNC: 2.6 GM/DL
GLUCOSE SERPL-MCNC: 97 MG/DL (ref 65–99)
GLUCOSE UR QL: NEGATIVE
HCT VFR BLD AUTO: 40.1 % (ref 34–46.6)
HDLC SERPL-MCNC: 77 MG/DL (ref 40–60)
HGB BLD-MCNC: 14.1 G/DL (ref 12–15.9)
HGB UR QL STRIP: ABNORMAL
IMM GRANULOCYTES # BLD AUTO: 0.01 10*3/MM3 (ref 0–0.05)
IMM GRANULOCYTES NFR BLD AUTO: 0.1 % (ref 0–0.5)
KETONES UR QL STRIP: NEGATIVE
LDLC SERPL CALC-MCNC: 164 MG/DL (ref 0–100)
LEUKOCYTE ESTERASE UR QL STRIP: NEGATIVE
LYMPHOCYTES # BLD AUTO: 2.06 10*3/MM3 (ref 0.7–3.1)
LYMPHOCYTES NFR BLD AUTO: 28.2 % (ref 19.6–45.3)
MCH RBC QN AUTO: 34.8 PG (ref 26.6–33)
MCHC RBC AUTO-ENTMCNC: 35.2 G/DL (ref 31.5–35.7)
MCV RBC AUTO: 99 FL (ref 79–97)
MICRO URNS: ABNORMAL
MICROALBUMIN UR-MCNC: 41.7 UG/ML
MONOCYTES # BLD AUTO: 0.61 10*3/MM3 (ref 0.1–0.9)
MONOCYTES NFR BLD AUTO: 8.4 % (ref 5–12)
MUCOUS THREADS URNS QL MICRO: PRESENT /HPF
NEUTROPHILS # BLD AUTO: 4.25 10*3/MM3 (ref 1.7–7)
NEUTROPHILS NFR BLD AUTO: 58.2 % (ref 42.7–76)
NITRITE UR QL STRIP: NEGATIVE
NRBC BLD AUTO-RTO: 0 /100 WBC (ref 0–0.2)
PH UR STRIP: 7 [PH] (ref 5–7.5)
PLATELET # BLD AUTO: 384 10*3/MM3 (ref 140–450)
POTASSIUM SERPL-SCNC: 5 MMOL/L (ref 3.5–5.2)
PROT SERPL-MCNC: 7.1 G/DL (ref 6–8.5)
PROT UR QL STRIP: ABNORMAL
RBC # BLD AUTO: 4.05 10*6/MM3 (ref 3.77–5.28)
RBC #/AREA URNS HPF: ABNORMAL /HPF (ref 0–2)
SODIUM SERPL-SCNC: 133 MMOL/L (ref 136–145)
SP GR UR: 1.02 (ref 1–1.03)
T4 FREE SERPL-MCNC: 0.58 NG/DL (ref 0.93–1.7)
TRIGL SERPL-MCNC: 182 MG/DL (ref 0–150)
TSH SERPL DL<=0.005 MIU/L-ACNC: ABNORMAL UIU/ML (ref 0.27–4.2)
UNIDENT CRYS URNS QL MICRO: PRESENT
URINALYSIS REFLEX: ABNORMAL
UROBILINOGEN UR STRIP-MCNC: 0.2 MG/DL (ref 0.2–1)
VLDLC SERPL CALC-MCNC: 33 MG/DL (ref 5–40)
WBC # BLD AUTO: 7.3 10*3/MM3 (ref 3.4–10.8)
WBC #/AREA URNS HPF: ABNORMAL /HPF (ref 0–5)

## 2020-12-22 ENCOUNTER — LAB REQUISITION (OUTPATIENT)
Dept: LAB | Facility: HOSPITAL | Age: 70
End: 2020-12-22

## 2020-12-22 ENCOUNTER — TELEPHONE (OUTPATIENT)
Dept: INTERNAL MEDICINE | Facility: CLINIC | Age: 70
End: 2020-12-22

## 2020-12-22 ENCOUNTER — LAB (OUTPATIENT)
Dept: LAB | Facility: HOSPITAL | Age: 70
End: 2020-12-22

## 2020-12-22 DIAGNOSIS — E78.2 MIXED HYPERLIPIDEMIA: Primary | ICD-10-CM

## 2020-12-22 DIAGNOSIS — E03.9 ACQUIRED HYPOTHYROIDISM: ICD-10-CM

## 2020-12-22 DIAGNOSIS — Z00.00 ROUTINE GENERAL MEDICAL EXAMINATION AT A HEALTH CARE FACILITY: ICD-10-CM

## 2020-12-22 DIAGNOSIS — I10 BENIGN ESSENTIAL HYPERTENSION: ICD-10-CM

## 2020-12-22 PROCEDURE — 36415 COLL VENOUS BLD VENIPUNCTURE: CPT

## 2020-12-22 NOTE — TELEPHONE ENCOUNTER
PATIENT WOULD LIKE A ORDER PUT IN TO GET HER THYROID CHECK. PATIENT STATED THAT IT WAS REALLY HIGH. PLEASE LET PATIENT KNOW WHEN THE ORDER IS PUT IN.      CALL BACK NUMBER: [phone]

## 2020-12-28 ENCOUNTER — TELEPHONE (OUTPATIENT)
Dept: INTERNAL MEDICINE | Facility: CLINIC | Age: 70
End: 2020-12-28

## 2020-12-28 NOTE — TELEPHONE ENCOUNTER
Caller: Matt Grayson    Relationship: Self    Best call back number: [phone]    Caller requesting test results: MATT    Additional notes: MATT NEEDS LAB ORDERS FOR A THYROID TEST.  CALL HER TO LET HER KNOW WHEN IT IS SCHEDULED OR THE ORDER IS IN.

## 2020-12-31 ENCOUNTER — LAB (OUTPATIENT)
Dept: LAB | Facility: HOSPITAL | Age: 70
End: 2020-12-31

## 2020-12-31 DIAGNOSIS — E03.9 ACQUIRED HYPOTHYROIDISM: ICD-10-CM

## 2020-12-31 DIAGNOSIS — E78.2 MIXED HYPERLIPIDEMIA: ICD-10-CM

## 2020-12-31 LAB
ALBUMIN SERPL-MCNC: 4.3 G/DL (ref 3.5–5.2)
ALBUMIN/GLOB SERPL: 1.3 G/DL
ALP SERPL-CCNC: 61 U/L (ref 39–117)
ALT SERPL W P-5'-P-CCNC: 14 U/L (ref 1–33)
ANION GAP SERPL CALCULATED.3IONS-SCNC: 11.1 MMOL/L (ref 5–15)
AST SERPL-CCNC: 18 U/L (ref 1–32)
BILIRUB SERPL-MCNC: 0.3 MG/DL (ref 0–1.2)
BUN SERPL-MCNC: 16 MG/DL (ref 8–23)
BUN/CREAT SERPL: 17.4 (ref 7–25)
CALCIUM SPEC-SCNC: 9.5 MG/DL (ref 8.6–10.5)
CHLORIDE SERPL-SCNC: 104 MMOL/L (ref 98–107)
CHOLEST SERPL-MCNC: 248 MG/DL (ref 0–200)
CO2 SERPL-SCNC: 24.9 MMOL/L (ref 22–29)
CREAT SERPL-MCNC: 0.92 MG/DL (ref 0.57–1)
GFR SERPL CREATININE-BSD FRML MDRD: 61 ML/MIN/1.73
GLOBULIN UR ELPH-MCNC: 3.2 GM/DL
GLUCOSE SERPL-MCNC: 97 MG/DL (ref 65–99)
HDLC SERPL-MCNC: 77 MG/DL (ref 40–60)
LDLC SERPL CALC-MCNC: 147 MG/DL (ref 0–100)
LDLC/HDLC SERPL: 1.87 {RATIO}
POTASSIUM SERPL-SCNC: 4.5 MMOL/L (ref 3.5–5.2)
PROT SERPL-MCNC: 7.5 G/DL (ref 6–8.5)
SODIUM SERPL-SCNC: 140 MMOL/L (ref 136–145)
T4 FREE SERPL-MCNC: 0.86 NG/DL (ref 0.93–1.7)
TRIGL SERPL-MCNC: 136 MG/DL (ref 0–150)
TSH SERPL DL<=0.05 MIU/L-ACNC: 123 UIU/ML (ref 0.27–4.2)
VLDLC SERPL-MCNC: 24 MG/DL (ref 5–40)

## 2020-12-31 PROCEDURE — 84443 ASSAY THYROID STIM HORMONE: CPT

## 2020-12-31 PROCEDURE — 80053 COMPREHEN METABOLIC PANEL: CPT

## 2020-12-31 PROCEDURE — 84439 ASSAY OF FREE THYROXINE: CPT

## 2020-12-31 PROCEDURE — 80061 LIPID PANEL: CPT

## 2021-01-01 ENCOUNTER — TELEPHONE (OUTPATIENT)
Dept: INTERNAL MEDICINE | Facility: CLINIC | Age: 71
End: 2021-01-01

## 2021-01-06 ENCOUNTER — TELEMEDICINE (OUTPATIENT)
Dept: INTERNAL MEDICINE | Facility: CLINIC | Age: 71
End: 2021-01-06

## 2021-01-06 VITALS
DIASTOLIC BLOOD PRESSURE: 78 MMHG | WEIGHT: 150 LBS | SYSTOLIC BLOOD PRESSURE: 130 MMHG | HEIGHT: 60 IN | BODY MASS INDEX: 29.45 KG/M2

## 2021-01-06 DIAGNOSIS — E66.3 OVERWEIGHT WITH BODY MASS INDEX (BMI) OF 29 TO 29.9 IN ADULT: ICD-10-CM

## 2021-01-06 DIAGNOSIS — I10 BENIGN ESSENTIAL HYPERTENSION: ICD-10-CM

## 2021-01-06 DIAGNOSIS — E03.9 ACQUIRED HYPOTHYROIDISM: Primary | ICD-10-CM

## 2021-01-06 DIAGNOSIS — F43.29 STRESS AND ADJUSTMENT REACTION: ICD-10-CM

## 2021-01-06 DIAGNOSIS — Z12.31 SCREENING MAMMOGRAM, ENCOUNTER FOR: ICD-10-CM

## 2021-01-06 DIAGNOSIS — R53.83 OTHER FATIGUE: ICD-10-CM

## 2021-01-06 DIAGNOSIS — E78.2 MIXED HYPERLIPIDEMIA: ICD-10-CM

## 2021-01-06 PROCEDURE — 99214 OFFICE O/P EST MOD 30 MIN: CPT | Performed by: INTERNAL MEDICINE

## 2021-01-06 RX ORDER — LOSARTAN POTASSIUM 50 MG/1
50 TABLET ORAL DAILY
Qty: 90 TABLET | Refills: 1 | Status: SHIPPED | OUTPATIENT
Start: 2021-01-06 | End: 2021-10-18

## 2021-01-06 RX ORDER — SERTRALINE HYDROCHLORIDE 25 MG/1
25 TABLET, FILM COATED ORAL DAILY
Qty: 90 TABLET | Refills: 1 | Status: SHIPPED | OUTPATIENT
Start: 2021-01-06 | End: 2021-11-03

## 2021-01-06 RX ORDER — LEVOTHYROXINE SODIUM 0.07 MG/1
75 TABLET ORAL DAILY
Qty: 90 TABLET | Refills: 1 | Status: SHIPPED | OUTPATIENT
Start: 2021-01-06 | End: 2021-03-03 | Stop reason: SDUPTHER

## 2021-01-06 NOTE — PROGRESS NOTES
"[facility]     Tenisha Grayson  1950   [id number]      Patient Care Team:  Doris Canseco MD as PCP - General (Internal Medicine)  Deisy Sumner MD as Consulting Physician (Dermatology)    You have chosen to receive care through a telehealth visit.  Do you consent to use a video/audio connection for your medical care today? Yes    Chronic complaint:    HPI  Patient is a 70 y.o. female presents with fatigue worse over several months.  Stress has been high this year. Trying to get custody of grand child. Planned a wedding. Sleeping ok. Not much exercise. Feels down and depressed. No anxiety. No palpitations or constipation of hair loss.  Maybe dryer skin.    She says she felt wonderful from 2013 to 2018. In a program that used hormones and supplements. Had good energy and was exercising then.  She rejoined that program in December. That is when they checked thyroid lab and  They told her it was \"high\".      She states she had been taking levothyroxine every day but taking it with coffee.  She was not taking it at the same time every day.  We had decreased levothyroxine from 75mcg to 50mcg daily 1/2020 since TSH was depressed.  The patient was supposed to return to the lab 6 to 8 weeks later to recheck labs to make sure the dose was then therapeutic.    CHRONIC CONDITIONS  Blood pressures at home run 120-130/70-78.  She does try to avoid salt in the diet.  She takes losartan 50 mg tablet daily.    She has gained weight.  Recent weight is 150 pounds.  It was 147 here in September.    Past Medical History:   Diagnosis Date   • Abnormal EKG    • Atrophic vaginitis    • COPD (chronic obstructive pulmonary disease) (CMS/HCC) 1995   • Fen-phen exposure 1996    negative workup   • Fibroids 1991    MANJULA and BSO   • Hyperlipidemia    • Hypertension    • Hypothyroidism 2000   • Obesity, mild 1990   • Rectocele    • Thyroid disease    • Vitamin D deficiency disease        Past Surgical History: "   Procedure Laterality Date   • CARDIAC CATHETERIZATION Left 2014    Normal coronaries   • COLONOSCOPY  2008    Normal study   • OTHER SURGICAL HISTORY  2013    Rectocele repair   • TOTAL ABDOMINAL HYSTERECTOMY WITH SALPINGO OOPHORECTOMY  1991    For fibroids and bleeding       Family History   Problem Relation Age of Onset   • COPD Mother    • Other Mother         Polio   • Osteoporosis Mother    • Heart attack Father         Cause of death, Non smoker   • Hypertension Father    • Breast cancer Sister 60   • Alcohol abuse Brother    • Suicidality Brother    • Thrombophlebitis Brother    • No Known Problems Brother    • Unexplained death Other    • Breast cancer Daughter 28   • Hypertension Other    • Coronary artery disease Other    • Ovarian cancer Neg Hx        Social History     Socioeconomic History   • Marital status:      Spouse name: Not on file   • Number of children: Not on file   • Years of education: Not on file   • Highest education level: Not on file   Tobacco Use   • Smoking status: Current Some Day Smoker     Packs/day: 1.00     Years: 44.00     Pack years: 44.00     Types: Cigarettes   • Smokeless tobacco: Never Used   • Tobacco comment: Stopped 8/7/17   Substance and Sexual Activity   • Alcohol use: Yes     Frequency: 2-3 times a week     Drinks per session: 1 or 2     Binge frequency: Never   • Drug use: No   • Sexual activity: Defer   Social History Narrative    Domestic life : Lives in private home with         Yazidi : Jewish        Sleep hygiene : In bed 10 PM to 7 AM for 8 hours of sleep        Caffeine use : 3 cups of coffee daily        Exercise habits : Walks 30 minutes daily - no upper body exercise 2017        Diet : Well-balanced diet low in salt low in sugar        Occupation : Homemaker        Hearing : No impairment        Vision : Corrects with glasses        Dental : Adequate dentition        Driving : No limitations       Allergies   Allergen Reactions   •  "Bupropion      Drowsiness       Review of Systems:     Review of Systems   Constitutional: Positive for fatigue. Negative for chills and fever.   HENT: Negative for congestion, ear pain, sinus pressure and sore throat.    Eyes: Negative for visual disturbance.   Respiratory: Negative for cough, chest tightness, shortness of breath and wheezing.    Cardiovascular: Negative for chest pain, palpitations and leg swelling.   Gastrointestinal: Negative for abdominal pain, blood in stool, constipation, diarrhea and GERD.   Genitourinary: Negative for dysuria and frequency.   Musculoskeletal: Negative for arthralgias.   Skin: Negative for color change.   Allergic/Immunologic: Negative for environmental allergies.   Neurological: Negative for dizziness, speech difficulty and headache.   Hematological: Negative for adenopathy. Does not bruise/bleed easily.   Psychiatric/Behavioral: Positive for dysphoric mood and stress. Negative for sleep disturbance and suicidal ideas. The patient is not nervous/anxious.        Vital Signs  Vitals:    01/06/21 1507   BP: 130/78   Weight: 68 kg (150 lb)   Height: 152.4 cm (60\")     Body mass index is 29.29 kg/m².      Current Outpatient Medications:   •  aspirin 325 MG tablet, Take  by mouth. sometimes, Disp: , Rfl:   •  CBD (cannabidiol) oral oil, Take 1 drop by mouth 2 (Two) Times a Day. Not QD, Disp: , Rfl:   •  Cholecalciferol (VITAMIN D3) 50 MCG (2000 UT) capsule, Take 1 capsule by mouth Daily., Disp: 30 capsule, Rfl: 11  •  Hormone Cream Base cream, Apply 2 clicks (0.5mL) to inner arm or thigh each day  Estradiol/Estriol/Testosterone Versabase CR 0.2/0.8/1mg/0.5mL, Disp: 15 g, Rfl: 5  •  losartan (COZAAR) 50 MG tablet, Take 1 tablet by mouth Daily., Disp: 90 tablet, Rfl: 1  •  MULTIPLE VITAMIN PO, Take  by mouth daily., Disp: , Rfl:   •  progesterone (PROMETRIUM) 200 MG capsule, Take 2 capsules by mouth 1-2 hours before bedtime, Disp: 180 capsule, Rfl: 1  •  levothyroxine (SYNTHROID, " LEVOTHROID) 75 MCG tablet, Take 1 tablet by mouth Daily., Disp: 90 tablet, Rfl: 1  •  sertraline (ZOLOFT) 25 MG tablet, Take 1 tablet by mouth Daily., Disp: 90 tablet, Rfl: 1    Physical Exam:    Physical Exam  Constitutional:       Appearance: She is overweight. She is not ill-appearing or toxic-appearing.   HENT:      Head: Normocephalic.   Eyes:      Extraocular Movements: Extraocular movements intact.      Conjunctiva/sclera: Conjunctivae normal.      Pupils: Pupils are equal, round, and reactive to light.   Pulmonary:      Effort: Pulmonary effort is normal.   Neurological:      General: No focal deficit present.      Mental Status: She is alert and oriented to person, place, and time.   Psychiatric:         Attention and Perception: Attention normal.         Mood and Affect: Mood and affect normal.         Speech: Speech normal.         Behavior: Behavior normal.         Thought Content: Thought content normal.         Cognition and Memory: Cognition normal.         Judgment: Judgment normal.          ACE III MINI        Results Review:    I reviewed the patient's new clinical results.  We reviewed her recent labs done here in detail.    CMP:  Lab Results   Component Value Date    GLU 97 11/09/2020    BUN 16 12/31/2020    CREATININE 0.92 12/31/2020    EGFRIFNONA 61 12/31/2020    EGFRIFAFRI 74 11/09/2020    BCR 17.4 12/31/2020     12/31/2020    K 4.5 12/31/2020    CO2 24.9 12/31/2020    CALCIUM 9.5 12/31/2020    PROTENTOTREF 7.1 11/09/2020    ALBUMIN 4.30 12/31/2020    LABGLOBREF 2.6 11/09/2020    LABIL2 1.7 11/09/2020    BILITOT 0.3 12/31/2020    ALKPHOS 61 12/31/2020    AST 18 12/31/2020    ALT 14 12/31/2020     HbA1c:  Lab Results   Component Value Date    HGBA1C 5.8 06/24/2014     Microalbumin:  Lab Results   Component Value Date    MICROALBUR 41.7 11/09/2020     Lipid Panel  Lab Results   Component Value Date    CHOL 248 (H) 12/31/2020    TRIG 136 12/31/2020    HDL 77 (H) 12/31/2020     (H)  12/31/2020    AST 18 12/31/2020    ALT 14 12/31/2020       Medication Review: Medications reviewed and noted  Patient Instructions   Problem List Items Addressed This Visit        Cardiac and Vasculature    Benign essential hypertension    Overview     1/6/2021 Doris Canseco MD    Continue losartan 50 mg tablet daily.      Avoid salt in the diet.  Avoid sodas.    Continue to check blood pressures at home.  We would like it to stay 120s/70s or less.           Relevant Medications    losartan (COZAAR) 50 MG tablet    Mixed hyperlipidemia    Overview     1/6/2021 Doris Canseco MD    Increase exercise and walking.  Goal is to get 30 minutes of aerobic exercise a day 5 days a week.  Continue to improve low fat and low sugar diet.            Endocrine and Metabolic    Acquired hypothyroidism - Primary    Overview     1/6/2021 Doris Canseco MD    Increase levothyroxine to 75 mcg daily.    She will start taking it first thing in the morning with a glass of water then waiting at least 30 minutes before she has coffee, tea, food, or other medications.    We will have the patient follow-up here in 6 weeks and recheck the levels at that point to make sure we are giving her enough.           Relevant Medications    levothyroxine (SYNTHROID, LEVOTHROID) 75 MCG tablet       Genitourinary and Reproductive     Screening mammogram, encounter for    Overview     1/6/2021 Doris Canseco MD    Patient will reschedule her mammogram.            Mental Health    Stress and adjustment reaction    Overview     1/6/2021 Doris Canseco MD    Start taking low-dose sertraline (Zoloft) 25 mg tablet daily.    Physical activity and walking also help improve mood and decrease stress.    New medication added today. Benefits and possible side effects discussed. Patient verbalized understanding.           Relevant Medications    sertraline (ZOLOFT) 25 MG tablet       Symptoms and Signs    Other fatigue    Overview     1/6/2021 Doris  LEE ANN Canseco MD    Fatigue and feelings of dysthymia are probably largely due to low thyroid hormone, but the stress of the past year is also contributing.    Increase levothyroxine dose today.  She will start taking it first thing in the morning with a glass of water then waiting at least 30 minutes before she has coffee, tea, food, or other medications.    Increasing physical activity actually helps improve fatigue.            Other    Overweight with body mass index (BMI) of 29 to 29.9 in adult    Overview     1/6/2021 Doris Canseco MD    Increase regular exercise.  Also do some walking every day.    Continue to improve low-fat and low sugar diet.    Weigh every Monday morning to monitor progress.                  Diagnosis Plan   1. Acquired hypothyroidism     2. Other fatigue     3. Stress and adjustment reaction     4. Benign essential hypertension     5. Mixed hyperlipidemia     6. Overweight with body mass index (BMI) of 29 to 29.9 in adult     7. Screening mammogram, encounter for         Plan of care reviewed with patient at the conclusion of today's visit. Education was provided regarding diagnosis, management, and any prescribed or recommended OTC medications.Patient verbalizes understanding of and agreement with management plan.         Doris Canseco MD

## 2021-01-06 NOTE — PATIENT INSTRUCTIONS
Patient Instructions   Problem List Items Addressed This Visit        Cardiac and Vasculature    Benign essential hypertension    Overview     1/6/2021 Doris Canseco MD    Continue losartan 50 mg tablet daily.      Avoid salt in the diet.  Avoid sodas.    Continue to check blood pressures at home.  We would like it to stay 120s/70s or less.           Relevant Medications    losartan (COZAAR) 50 MG tablet    Mixed hyperlipidemia    Overview     1/6/2021 Doris Canseco MD    Increase exercise and walking.  Goal is to get 30 minutes of aerobic exercise a day 5 days a week.  Continue to improve low fat and low sugar diet.            Endocrine and Metabolic    Acquired hypothyroidism - Primary    Overview     1/6/2021 Doris Canseco MD    Increase levothyroxine to 75 mcg daily.    She will start taking it first thing in the morning with a glass of water then waiting at least 30 minutes before she has coffee, tea, food, or other medications.    We will have the patient follow-up here in 6 weeks and recheck the levels at that point to make sure we are giving her enough.           Relevant Medications    levothyroxine (SYNTHROID, LEVOTHROID) 75 MCG tablet       Genitourinary and Reproductive     Screening mammogram, encounter for    Overview     1/6/2021 Doris Canseco MD    Patient will reschedule her mammogram.            Mental Health    Stress and adjustment reaction    Overview     1/6/2021 Doris Canseco MD    Start taking low-dose sertraline (Zoloft) 25 mg tablet daily.    Physical activity and walking also help improve mood and decrease stress.    New medication added today. Benefits and possible side effects discussed. Patient verbalized understanding.           Relevant Medications    sertraline (ZOLOFT) 25 MG tablet       Symptoms and Signs    Other fatigue    Overview     1/6/2021 Doris Canseco MD    Fatigue and feelings of dysthymia are probably largely due to low thyroid hormone, but the  stress of the past year is also contributing.    Increase levothyroxine dose today.  She will start taking it first thing in the morning with a glass of water then waiting at least 30 minutes before she has coffee, tea, food, or other medications.    Increasing physical activity actually helps improve fatigue.            Other    Overweight with body mass index (BMI) of 29 to 29.9 in adult    Overview     1/6/2021 Doris Canseco MD    Increase regular exercise.  Also do some walking every day.    Continue to improve low-fat and low sugar diet.    Weigh every Monday morning to monitor progress.

## 2021-01-18 ENCOUNTER — IMMUNIZATION (OUTPATIENT)
Dept: VACCINE CLINIC | Facility: HOSPITAL | Age: 71
End: 2021-01-18

## 2021-01-18 PROCEDURE — 91300 HC SARSCOV02 VAC 30MCG/0.3ML IM: CPT | Performed by: FAMILY MEDICINE

## 2021-01-18 PROCEDURE — 0001A: CPT | Performed by: FAMILY MEDICINE

## 2021-01-26 NOTE — TELEPHONE ENCOUNTER
Caller: [facility], [place] - [place], [place] - [facility] [address] - [phone]  - [phone]    Relationship: Pharmacy    Best call back number: [phone]  Medication needed:   Requested Prescriptions     Pending Prescriptions Disp Refills   • progesterone (PROMETRIUM) 200 MG capsule 180 capsule 1     Sig: Take 2 capsules by mouth 1-2 hours before bedtime       When do you need the refill by:  What details did the patient provide when requesting the medication: HAS 5 DAY SUPPLY    Does the patient have less than a 3 day supply:  [] Yes  [x] No

## 2021-01-26 NOTE — TELEPHONE ENCOUNTER
Caller: [facility] - [place], [place] - [address] - [phone]  - [phone]    Relationship: Pharmacy      Medication needed:   Requested Prescriptions     Pending Prescriptions Disp Refills   • progesterone (PROMETRIUM) 200 MG capsule 180 capsule 1     Sig: Take 2 capsules by mouth 1-2 hours before bedtime       When do you need the refill by: today    What details did the patient provide when requesting the medication: was sent to wrong pharmacy    Does the patient have less than a 3 day supply:  [x] Yes  [] No    What is the patient's preferred pharmacy:  [facility] - [place], [place] - [address] - [phone] [facility] [place] [phone] FX           OFFICE VISIT      Patient: Kitty Oates Date of Service: 4/15/2019   : 1991 MRN: [id number]     SUBJECTIVE:     Chief Complaint   Patient presents with   • Annual Exam     requesting STD check, no sexual activity in 2 yrs, also, no symtoms.        Last seen on 16.  she is not accompanied.  Patient is agreeable to have a scribe present during the visit.     Pt is here for an annual physical exam. She is requesting for an STD check. She denies any sx, and has not been sexually active for two years. Pt wants to get a pap smear done in the office. She has never had an abnormal pap smear per pt. She was diagnosed with genital herpes in 2016, and only had one flare up.    Pt reports that she gained weight since she stopped smoking one year ago.    Pt feels as if she has strep throat. She explains that she can feel the epiglottis hitting the walls of her throat, causing her to cough. She explains that she occ/ regularly has swollen tonsils. She has been considering a  Tonsillectomy, because her tonsils are often inflamed and irritated.    Pt explains that she has been feeling overwhelmed due to working 2 jobs, while also in school.       Review of Systems   Constitutional: Negative for fever.   Respiratory: Positive for cough. Negative for shortness of breath.    Cardiovascular: Negative for chest pain.   Psychiatric/Behavioral: Negative for suicidal ideas.        Per HPI   All other systems reviewed and are negative.        ALLERGIES:  ALLERGIES:  No Known Allergies      MEDICATIONS:  No current outpatient medications on file.     No current facility-administered medications for this visit.          PAST MEDICAL HISTORY:  Past Medical History:   Diagnosis Date   • Chest tightness    • Herpes, genital    • Migraines    • Nicotine dependence          PAST SURGICAL HISTORY:  History reviewed. No pertinent surgical history.      FAMILY HISTORY:  No family history on file.      SOCIAL HISTORY:  Social History      Tobacco Use   • Smoking status: Former Smoker   • Smokeless tobacco: Never Used   • Tobacco comment: quit in 2018   Substance Use Topics   • Alcohol use: Never     Frequency: Never   • Drug use: Never       Past medical history, family medical history, surgical history and social history reviewed      OBJECTIVE:     Visit Vitals  /60 ([place] [facility] [place]itting, Cuff Size: Small Adult)   Pulse 60   Temp 97.6 °F (36.4 °C)   Resp 16   Ht 5' 8\" (1.727 m)   Wt 83 kg (182 lb 15.7 oz)   SpO2 100%   BMI 27.82 kg/m²         Physical Exam   Constitutional: She is oriented to person, place, and time. She appears well-developed and well-nourished.   HENT:   Head: Normocephalic and atraumatic.   Mouth/Throat: Oropharynx is clear and moist. No oropharyngeal exudate.   +2 enlarged tonsils noted, not inflamed.  Noninflamed epiglottis can be seen in the posterior oropharynx.   Eyes: Pupils are equal, round, and reactive to light. Right eye exhibits no discharge. Left eye exhibits no discharge.   Neck: Neck supple.   Cardiovascular: Normal rate and regular rhythm. Exam reveals no gallop and no friction rub.   No murmur heard.  Pulmonary/Chest: Effort normal and breath sounds normal. No respiratory distress. She has no wheezes.   Abdominal: Soft. Bowel sounds are normal. She exhibits no distension and no mass. There is no tenderness.   Musculoskeletal: Normal range of motion. She exhibits no edema.   Lymphadenopathy:     She has no cervical adenopathy.   Neurological: She is alert and oriented to person, place, and time.   Skin: No rash noted.   Psychiatric: She has a normal mood and affect.   Nursing note and vitals reviewed.          DIAGNOSTIC STUDIES:   LAB RESULTS:    No visits with results within 1 Month(s) from this visit.   Latest known visit with results is:   Lab Services on 09/26/2017   Component Date Value Ref Range Status   • PAP WITH HIGH RISK HPV 09/26/2017     Final                     Value:Name: OLINDA LAM            MRN:     [id number]    :  1991                     Visit#:  [id number]                            Gynecologic Cytology Consultation Report        Client: [facility]        Date Specimen Collected: 17            Accession #:  [id number]    Date Specimen Received:  17            Requisition    #:[id number]    Date Reported:           10/4/2017 11:16    Location:     [facility]                            * Addendum Present *    ______________________________________________________________________________    Cytologic Interpretation :        Negative for intraepithelial lesion or malignancy.          Satisfactory for evaluation.  Presence of endocervical/transformation zone    component.            SIMONA Spencer(ASCP)    SIMONA Gage(ASCP)    ** Electronic Signature (NXG) 10/4/2017   11:16 **        Educational note:  The Pap test is a screening test with a well-recognized    false neg                          ative rate.  The best means available to lower the false negative    rate and to detect early cervical lesions is a Pap test at regular intervals.     All ThinPrep Paps will be reviewed with the aid of the ThinPrep Imaging    System, unless otherwise specified.        ______________________________________________________________________________    Clinical Information:    LMP: [id number]    Menstrual Hx:  Regular Menses    Other Clinical Conditions:Pap source: Endocervical    REASON FOR COLLECTION::LOW RISK - ENCOUNTER FOR SCREENING FOR MALIGNANT    NEOPLASM OF CERVIX Z12.4    SOURCE::ENDOCERVICAL        Specimen(s) Submitted:     PAP with High Risk HPV        Procedures/Addenda:    HPV, High Risk_IL:    Date Ordered:     2017     Date Reported:2017        Interpretation    Aptimae HPV HIGH RISK (TYPES 16,18,31,33,35,39,45,51,52,56,58,59,66,68):    NEGATIVE        The APTIMA HPV Assay is an FDA  approved in vitro nucleic acid amplification    test for the qualitative detection                           of E6/E7 viral messenger RNA (mRNA) from 14    high-risk types of human papillomavirus (HPV) in cervical specimens. The    high-risk HPV types detected by the assay include: 16, 18, 31, 33, 35, 39, 45,    51, 52, 56, 58, 59, 66, and 68. The Aptima HPV Assay does not discriminate    between the 14 high-risk types. Cervical specimens in the Thin Prep Pap Test    vials containing PreservCyt Solution and collected with broom-type or    cytobrush/spatula collection devices may be tested with this assay using the    ANTs SoftwareHER System.         The APTIMA HPV Assay is intended to screen women 21 years and older with    atypical squamous cells of undetermined significance (ASC-US) cervical    cytology results to determine the need for referral to colposcopy. Test    results are not intended to prevent women from proceeding to colposcopy.         In women 30 years and older, the above assay can be used with cervical    cytology to adjunctively screen to assess the presence or absence of high-risk                              HPV types. This information, with the physician's assessment of cytology    history, other risk factors, and guidelines, may be used to guide patient    management.                                         ** Electronic Signature ** () 9/29/2017 10:38 **                ICD Codes:     Z12.4        Fee Codes:     A: [id number]     HPV_IL: [id number]        Performing Lab Location (Unless otherwise specified):    [facility]    [address] [place] [address]. [phone]       • URIC ACID 09/26/2017 3.4  2.6 - 5.9 mg/dl Final   • HIV PCR QUANT 09/26/2017 NOT DETECTED  NOT DETECTED copies/mL Final   • LOG 10 HIV 09/26/2017 NOT DETECTED  NOT DETECTED copies/mL Final    Comment:    No HIV 1 RNA was detected in this specimen. The lower limit of detection of this assay is about 40 HIV RNA  copies per mL. A negative result does not rule out the possibility of a viral load below the detection limit of the assay.     This result is a quantitative estimate of HIV 1 RNA copies per mL of plasma, determined by a PCR based method. Assay performed using the Abbott RealTime HIV 1 assay.     • MED SP DNA PROBE 09/26/2017 NEGATIVE  NEGATIVE   Final   • GARDNERELLA DNA PRB 09/26/2017 NEGATIVE  NEGATIVE   Final   • TRICH VAG DNA PROBE 09/26/2017 NEGATIVE  NEGATIVE   Final   • RPR Screen 09/26/2017 NONREACTIVE  NONREACTIVE   Final    See Directory of Services for interpretation of syphilis testing algorithm.   • Chlamydia Trachomatis by Nucleic A* 09/26/2017 NEGATIVE  NEGATIVE   Final   • Neisseria Gonorrhoeae by Nucleic A* 09/26/2017 NEGATIVE  NEGATIVE   Final   • SOURCE 09/26/2017 VAGINA    Final       Assessment AND PLAN:   This is a 28 year old female who presents for an annual exam.    1. Well adult exam    2. Screening examination for STD (sexually transmitted disease)    3. Enlarged tonsils      Otherwise normal exam  -- screening recommendation and education given    Enlarged Tonsils--currently no evidence of infection  --She is predisposed to strep throat and currently has a cough  --Push fluids; symptomatic management; monitor  - referred to ENT on 4.15.19    RHM  -opthal--advised to schedule appointment history of genital herpes diagnosed in 2016  -pap- with HPV last done 2017 is negative-- f/up in 2022    REMINDERS  FOLLOW UP WITH Dr. Patel in 3 months  LABS: cbc, cmp, STD screen  KEEP/ SCHEDULE APPT: ENT  NEXT VISIT: CDM, lab results        Time spent with patient: 30 minutes  50% of time was spent educating patient and coordinating care.      Instructions provided as documented in the AVS.      The Patient and/family or representative  indicated understanding of the diagnosis and agreed with the plan of care.        Scribe Attestation: Entered by Delia Toney, acting as scribe for Dr. Campo  Jorge    Provider Attestation: The documentation recorded by the scribe accurately reflects the service I personally performed and the decisions made by me, Aric Patel MD

## 2021-02-08 ENCOUNTER — IMMUNIZATION (OUTPATIENT)
Dept: VACCINE CLINIC | Facility: HOSPITAL | Age: 71
End: 2021-02-08

## 2021-02-08 PROCEDURE — 0002A: CPT | Performed by: INTERNAL MEDICINE

## 2021-02-08 PROCEDURE — 91300 HC SARSCOV02 VAC 30MCG/0.3ML IM: CPT | Performed by: INTERNAL MEDICINE

## 2021-02-10 ENCOUNTER — TELEPHONE (OUTPATIENT)
Dept: INTERNAL MEDICINE | Facility: CLINIC | Age: 71
End: 2021-02-10

## 2021-02-10 NOTE — TELEPHONE ENCOUNTER
"Dexa ordered 09/10/20. Referral comments state:    \"  02/09/2021 03:29 PM In Basket (Outgoing)     Lee Escobar RegSched Rep     May cancel order per pcp         Can I cancel order?   "

## 2021-02-24 LAB
T3FREE SERPL-MCNC: 2.5 PG/ML (ref 2–4.4)
T4 FREE SERPL-MCNC: 1.15 NG/DL (ref 0.93–1.7)
THYROPEROXIDASE AB SERPL-ACNC: <9 IU/ML (ref 0–34)
TSH SERPL DL<=0.005 MIU/L-ACNC: 49.8 UIU/ML (ref 0.27–4.2)

## 2021-03-03 ENCOUNTER — TELEPHONE (OUTPATIENT)
Dept: INTERNAL MEDICINE | Facility: CLINIC | Age: 71
End: 2021-03-03

## 2021-03-03 DIAGNOSIS — E03.9 ACQUIRED HYPOTHYROIDISM: Primary | ICD-10-CM

## 2021-03-03 RX ORDER — TURMERIC 95 %
POWDER (GRAM) MISCELLANEOUS
COMMUNITY
End: 2021-11-03

## 2021-03-03 RX ORDER — LEVOTHYROXINE SODIUM 0.1 MG/1
100 TABLET ORAL DAILY
Qty: 90 TABLET | Refills: 1 | Status: SHIPPED | OUTPATIENT
Start: 2021-03-03 | End: 2021-08-17

## 2021-04-01 ENCOUNTER — TELEPHONE (OUTPATIENT)
Dept: INTERNAL MEDICINE | Facility: CLINIC | Age: 71
End: 2021-04-01

## 2021-04-01 NOTE — TELEPHONE ENCOUNTER
Mamm ordered in September and never completed. Pt has been called and never called to schedule. Can we cancel the order?

## 2021-04-23 ENCOUNTER — LAB (OUTPATIENT)
Dept: LAB | Facility: HOSPITAL | Age: 71
End: 2021-04-23

## 2021-04-23 DIAGNOSIS — E03.9 ACQUIRED HYPOTHYROIDISM: ICD-10-CM

## 2021-04-24 DIAGNOSIS — E03.9 ACQUIRED HYPOTHYROIDISM: ICD-10-CM

## 2021-04-24 DIAGNOSIS — E78.2 MIXED HYPERLIPIDEMIA: Primary | ICD-10-CM

## 2021-04-24 LAB
T3FREE SERPL-MCNC: 2 PG/ML (ref 2–4.4)
T4 FREE SERPL-MCNC: 1.34 NG/DL (ref 0.93–1.7)
TSH SERPL DL<=0.005 MIU/L-ACNC: 12.7 UIU/ML (ref 0.27–4.2)

## 2021-04-24 RX ORDER — LIOTHYRONINE SODIUM 5 UG/1
5 TABLET ORAL DAILY
Qty: 90 TABLET | Refills: 1 | Status: SHIPPED | OUTPATIENT
Start: 2021-04-24 | End: 2021-10-27

## 2021-04-24 RX ORDER — LIOTHYRONINE SODIUM 5 UG/1
5 TABLET ORAL DAILY
Qty: 90 TABLET | Refills: 1 | Status: SHIPPED | OUTPATIENT
Start: 2021-04-24 | End: 2021-04-24

## 2021-08-17 RX ORDER — LEVOTHYROXINE SODIUM 0.1 MG/1
100 TABLET ORAL DAILY
Qty: 90 TABLET | Refills: 1 | Status: SHIPPED | OUTPATIENT
Start: 2021-08-17 | End: 2021-11-03 | Stop reason: SDUPTHER

## 2021-08-17 NOTE — TELEPHONE ENCOUNTER
Rx Refill Note  Requested Prescriptions     Pending Prescriptions Disp Refills   • levothyroxine (SYNTHROID, LEVOTHROID) 100 MCG tablet [Pharmacy Med Name: LEVOTHYROXINE SODIUM 100  Tablet] 90 tablet 1     Sig: TAKE 1 TABLET BY MOUTH DAILY.      Last office visit with prescribing clinician: 9/10/2020      Next office visit with prescribing clinician: Visit date not found            Brinda Carr RN  08/17/21, 10:43 EDT

## 2021-08-25 ENCOUNTER — TELEPHONE (OUTPATIENT)
Dept: INTERNAL MEDICINE | Facility: CLINIC | Age: 71
End: 2021-08-25

## 2021-08-25 NOTE — TELEPHONE ENCOUNTER
twyla 01/06/21  Na none     [facility] is requesting refill for progesterone slow release 200mg cap

## 2021-08-25 NOTE — TELEPHONE ENCOUNTER
Please let patient know I sent her prescription to cardiologist.  She needs physical with me this month.

## 2021-08-26 NOTE — TELEPHONE ENCOUNTER
WAS ABLE TO CONTACT PATIENT BUT SHE WAS ON THE ROAD AT THE TIME.  SHE STATED SHE WOULD CALL OUR OFFICE BACK AFTER SHE CHECKS HER CALENDAR.      PATIENT STATED SHE DID NOT KNOW WHAT THE MESSAGE WAS ABOUT REGARDING  A PRESCRIPTION TO THE CARDIOLOGIST.  SHE STATED SHE IS NOT SEEING A CARDIOLOGIST.     FYI TO DR. JONES

## 2021-08-26 NOTE — TELEPHONE ENCOUNTER
LEFT VOICEMAIL FOR PATIENT TO CALL OFFICE AND SCHEDULE AWV VISIT FOR AFTER September 10, 2021 (YOUR LAST AWV WAS September 10, 2020)    OK FOR HUB TO READ

## 2021-08-27 NOTE — TELEPHONE ENCOUNTER
Shayne Brady interpreted what I said incorrectly.  I sent her prescription to [facility].    Please schedule AWV for September(as Tawana said it does not have to be after 365 days)

## 2021-10-18 RX ORDER — LOSARTAN POTASSIUM 50 MG/1
50 TABLET ORAL DAILY
Qty: 90 TABLET | Refills: 1 | Status: SHIPPED | OUTPATIENT
Start: 2021-10-18 | End: 2021-10-29

## 2021-10-27 RX ORDER — LIOTHYRONINE SODIUM 5 UG/1
5 TABLET ORAL DAILY
Qty: 90 TABLET | Refills: 1 | Status: SHIPPED | OUTPATIENT
Start: 2021-10-27 | End: 2022-01-24

## 2021-10-29 ENCOUNTER — LAB (OUTPATIENT)
Dept: LAB | Facility: HOSPITAL | Age: 71
End: 2021-10-29

## 2021-10-29 DIAGNOSIS — E78.2 MIXED HYPERLIPIDEMIA: ICD-10-CM

## 2021-10-29 DIAGNOSIS — E03.9 ACQUIRED HYPOTHYROIDISM: ICD-10-CM

## 2021-10-29 RX ORDER — LOSARTAN POTASSIUM 50 MG/1
50 TABLET ORAL DAILY
Qty: 90 TABLET | Refills: 1 | Status: SHIPPED | OUTPATIENT
Start: 2021-10-29 | End: 2022-05-24 | Stop reason: SDUPTHER

## 2021-10-30 LAB
ALBUMIN SERPL-MCNC: 4.3 G/DL (ref 3.5–5.2)
ALBUMIN/GLOB SERPL: 1.4 G/DL
ALP SERPL-CCNC: 70 U/L (ref 39–117)
ALT SERPL-CCNC: 25 U/L (ref 1–33)
AST SERPL-CCNC: 22 U/L (ref 1–32)
BILIRUB SERPL-MCNC: 0.3 MG/DL (ref 0–1.2)
BUN SERPL-MCNC: 16 MG/DL (ref 8–23)
BUN/CREAT SERPL: 19.5 (ref 7–25)
CALCIUM SERPL-MCNC: 9.9 MG/DL (ref 8.6–10.5)
CHLORIDE SERPL-SCNC: 103 MMOL/L (ref 98–107)
CHOLEST SERPL-MCNC: 234 MG/DL (ref 0–200)
CO2 SERPL-SCNC: 24.1 MMOL/L (ref 22–29)
CREAT SERPL-MCNC: 0.82 MG/DL (ref 0.57–1)
GLOBULIN SER CALC-MCNC: 3 GM/DL
GLUCOSE SERPL-MCNC: 109 MG/DL (ref 65–99)
HDLC SERPL-MCNC: 72 MG/DL (ref 40–60)
LDLC SERPL CALC-MCNC: 139 MG/DL (ref 0–100)
POTASSIUM SERPL-SCNC: 5.4 MMOL/L (ref 3.5–5.2)
PROT SERPL-MCNC: 7.3 G/DL (ref 6–8.5)
SODIUM SERPL-SCNC: 138 MMOL/L (ref 136–145)
T3FREE SERPL-MCNC: 3.7 PG/ML (ref 2–4.4)
T4 FREE SERPL-MCNC: 1.47 NG/DL (ref 0.93–1.7)
TRIGL SERPL-MCNC: 134 MG/DL (ref 0–150)
TSH SERPL DL<=0.005 MIU/L-ACNC: 1.14 UIU/ML (ref 0.27–4.2)
VLDLC SERPL CALC-MCNC: 23 MG/DL (ref 5–40)

## 2021-11-03 ENCOUNTER — OFFICE VISIT (OUTPATIENT)
Dept: INTERNAL MEDICINE | Facility: CLINIC | Age: 71
End: 2021-11-03

## 2021-11-03 VITALS
BODY MASS INDEX: 28.66 KG/M2 | HEART RATE: 84 BPM | SYSTOLIC BLOOD PRESSURE: 126 MMHG | WEIGHT: 146 LBS | DIASTOLIC BLOOD PRESSURE: 74 MMHG | HEIGHT: 60 IN | TEMPERATURE: 98.6 F

## 2021-11-03 DIAGNOSIS — Z00.00 ANNUAL PHYSICAL EXAM: ICD-10-CM

## 2021-11-03 DIAGNOSIS — I44.7 LEFT BUNDLE BRANCH BLOCK (LBBB) DETERMINED BY ELECTROCARDIOGRAPHY: ICD-10-CM

## 2021-11-03 DIAGNOSIS — M85.89 OSTEOPENIA OF MULTIPLE SITES: Chronic | ICD-10-CM

## 2021-11-03 DIAGNOSIS — E03.9 ACQUIRED HYPOTHYROIDISM: ICD-10-CM

## 2021-11-03 DIAGNOSIS — J44.9 COPD, MODERATE (HCC): ICD-10-CM

## 2021-11-03 DIAGNOSIS — E55.9 VITAMIN D DEFICIENCY: ICD-10-CM

## 2021-11-03 DIAGNOSIS — E78.2 MIXED HYPERLIPIDEMIA: ICD-10-CM

## 2021-11-03 DIAGNOSIS — Z72.0 TOBACCO USE: ICD-10-CM

## 2021-11-03 DIAGNOSIS — Z00.00 MEDICARE ANNUAL WELLNESS VISIT, SUBSEQUENT: ICD-10-CM

## 2021-11-03 DIAGNOSIS — I10 BENIGN ESSENTIAL HYPERTENSION: ICD-10-CM

## 2021-11-03 DIAGNOSIS — E66.3 OVERWEIGHT WITH BODY MASS INDEX (BMI) OF 28 TO 28.9 IN ADULT: Primary | Chronic | ICD-10-CM

## 2021-11-03 PROCEDURE — 99397 PER PM REEVAL EST PAT 65+ YR: CPT | Performed by: INTERNAL MEDICINE

## 2021-11-03 PROCEDURE — G0439 PPPS, SUBSEQ VISIT: HCPCS | Performed by: INTERNAL MEDICINE

## 2021-11-03 PROCEDURE — 1126F AMNT PAIN NOTED NONE PRSNT: CPT | Performed by: INTERNAL MEDICINE

## 2021-11-03 PROCEDURE — 93000 ELECTROCARDIOGRAM COMPLETE: CPT | Performed by: INTERNAL MEDICINE

## 2021-11-03 PROCEDURE — 1170F FXNL STATUS ASSESSED: CPT | Performed by: INTERNAL MEDICINE

## 2021-11-03 PROCEDURE — 96160 PT-FOCUSED HLTH RISK ASSMT: CPT | Performed by: INTERNAL MEDICINE

## 2021-11-03 RX ORDER — BISACODYL 5 MG
TABLET, DELAYED RELEASE (ENTERIC COATED) ORAL
COMMUNITY
Start: 2021-08-16 | End: 2023-03-08

## 2021-11-03 RX ORDER — LEVOTHYROXINE SODIUM 0.1 MG/1
100 TABLET ORAL DAILY
Qty: 90 TABLET | Refills: 1 | Status: SHIPPED | OUTPATIENT
Start: 2021-11-03 | End: 2022-02-28

## 2021-11-03 RX ORDER — PHENTERMINE HYDROCHLORIDE 37.5 MG/1
1 TABLET ORAL DAILY
COMMUNITY
Start: 2021-10-01 | End: 2021-11-03 | Stop reason: SDUPTHER

## 2021-11-03 RX ORDER — PHENTERMINE HYDROCHLORIDE 37.5 MG/1
37.5 TABLET ORAL DAILY
Qty: 30 TABLET | Refills: 2 | Status: SHIPPED | OUTPATIENT
Start: 2021-11-03 | End: 2022-01-24

## 2021-11-03 NOTE — PATIENT INSTRUCTIONS
Patient Instructions   Problem List Items Addressed This Visit        Cardiac and Vasculature    Benign essential hypertension    Overview     11/3/2021 Doris Canseco MD    Continue losartan 50 mg tablet daily.      Avoid salt in the diet.  Avoid sodas.    Continue to check blood pressures at home.  We would like it to stay 120s/70s or less.           Relevant Medications    losartan (COZAAR) 50 MG tablet    Other Relevant Orders    ECG 12 Lead    Left bundle branch block (LBBB) determined by electrocardiography    Overview     11/3/2021 Doris Canseco MD    EKG is unchanged.         Mixed hyperlipidemia    Overview     11/3/2021 Doris Canseco MD    Continue to increase exercise and walking.  Goal is to get 30 minutes of aerobic exercise a day 5 days a week.      Continue to improve low fat and low sugar diet.            Endocrine and Metabolic    Vitamin D deficiency    Overview     11/3/2021 Doris Canseco MD    Continue 2000 units of vitamin D3 daily.         Acquired hypothyroidism    Overview     11/3/2021 Doris Canseco MD    Continue levothyroxine 100 mcg daily.  Continue liothyronine 5 mcg tablet daily.             Relevant Medications    liothyronine (CYTOMEL) 5 MCG tablet    levothyroxine (SYNTHROID, LEVOTHROID) 100 MCG tablet       Health Encounters    Medicare annual wellness visit, subsequent    Overview     11/3/2021 Doris Canseco MD    Patient declines Mammogram and DEXA at present.     She will get her flu shot at her pharmacy.  She was also advised to get the shingles vaccine at her pharmacy.         Annual physical exam    Overview     11/3/2021 Doris Canseco MD    Patient declines Mammogram and DEXA at present.     She will get her flu shot at her pharmacy.  She was also advised to get the shingles vaccine at her pharmacy.            Musculoskeletal and Injuries    Osteopenia of multiple sites (Chronic)    Overview     11/3/2021 Doris Canseco MD    8/17/2018 DEXA  showed normal spine T-scores, left femoral neck T score was -1.9, right femoral neck T score was -2.2.      Do some weightbearing exercises daily.  Try to walk some every day.  Also use small hand weights about 5 minutes a day when you are not at the gym.    Maintain a normal weight.  Avoid a diet high in animal protein.    Take 5000 units vitamin D daily.  Also take 500 to 600 mg of calcium daily.                Pulmonary and Pneumonias    COPD, moderate (HCC)    Overview     11/3/2021 Doris Canseco MD    Patient is asymptomatic at present.  She has been advised to totally abstain from smoking.            Tobacco    Tobacco use    Overview     11/3/2021 Doris Canseco MD    She has been advised to totally abstain from smoking to decrease heart disease risk and lung cancer risk.            Other    Overweight with body mass index (BMI) of 28 to 28.9 in adult - Primary (Chronic)    Overview     11/3/2021 Doris Canseco MD    Continue to increase regular exercise and walking.      Continue to improve low-fat and low sugar diet.  Eat smaller portions at mealtime.  Avoid snacking.    Weigh every Monday morning to monitor progress.         Relevant Medications    phentermine (ADIPEX-P) 37.5 MG tablet          BMI for Adults  What is BMI?  Body mass index (BMI) is a number that is calculated from a person's weight and height. BMI can help estimate how much of a person's weight is composed of fat. BMI does not measure body fat directly. Rather, it is an alternative to procedures that directly measure body fat, which can be difficult and expensive.  BMI can help identify people who may be at higher risk for certain medical problems.  What are BMI measurements used for?  BMI is used as a screening tool to identify possible weight problems. It helps determine whether a person is obese, overweight, a healthy weight, or underweight.  BMI is useful for:  · Identifying a weight problem that may be related to a medical  "condition or may increase the risk for medical problems.  · Promoting changes, such as changes in diet and exercise, to help reach a healthy weight. BMI screening can be repeated to see if these changes are working.  How is BMI calculated?  BMI involves measuring your weight in relation to your height. Both height and weight are measured, and the BMI is calculated from those numbers. This can be done either in English ([place] or metric measurements. Note that charts and online BMI calculators are available to help you find your BMI quickly and easily without having to do these calculations yourself.  To calculate your BMI in English ([place] measurements:    1. Measure your weight in pounds (lb).  2. Multiply the number of pounds by 703.  ? For example, for a person who weighs 180 lb, multiply that number by 703, which equals 126,540.  3. Measure your height in inches. Then multiply that number by itself to get a measurement called \"inches squared.\"  ? For example, for a person who is 70 inches tall, the \"inches squared\" measurement is 70 inches x 70 inches, which equals 4,900 inches squared.  4. Divide the total from step 2 (number of lb x 703) by the total from step 3 (inches squared): 126,540 ÷ 4,900 = 25.8. This is your BMI.    To calculate your BMI in metric measurements:  1. Measure your weight in kilograms (kg).  2. Measure your height in meters (m). Then multiply that number by itself to get a measurement called \"meters squared.\"  ? For example, for a person who is 1.75 m tall, the \"meters squared\" measurement is 1.75 m x 1.75 m, which is equal to 3.1 meters squared.  3. Divide the number of kilograms (your weight) by the meters squared number. In this example: 70 ÷ 3.1 = 22.6. This is your BMI.  What do the results mean?  BMI charts are used to identify whether you are underweight, normal weight, overweight, or obese. The following guidelines will be used:  · Underweight: BMI less than 18.5.  · Normal weight: " BMI between 18.5 and 24.9.  · Overweight: BMI between 25 and 29.9.  · Obese: BMI of 30 or above.  Keep these notes in mind:  · Weight includes both fat and muscle, so someone with a muscular build, such as an athlete, may have a BMI that is higher than 24.9. In cases like these, BMI is not an accurate measure of body fat.  · To determine if excess body fat is the cause of a BMI of 25 or higher, further assessments may need to be done by a health care provider.  · BMI is usually interpreted in the same way for men and women.  Where to find more information  For more information about BMI, including tools to quickly calculate your BMI, go to these websites:  · Centers for Disease Control and Prevention: www.cdc.gov  · American Heart Association: www.heart.org  · National Heart, Lung, and Blood [place]: www.nhlbi.nih.gov  Summary  · Body mass index (BMI) is a number that is calculated from a person's weight and height.  · BMI may help estimate how much of a person's weight is composed of fat. BMI can help identify those who may be at higher risk for certain medical problems.  · BMI can be measured using English measurements or metric measurements.  · BMI charts are used to identify whether you are underweight, normal weight, overweight, or obese.  This information is not intended to replace advice given to you by your health care provider. Make sure you discuss any questions you have with your health care provider.  Document Revised: 09/09/2020 Document Reviewed: 07/17/2020  [employer] Patient Education © 2021 [employer]      Calorie Counting for Weight Loss  Calories are units of energy. Your body needs a certain number of calories from food to keep going throughout the day. When you eat or drink more calories than your body needs, your body stores the extra calories mostly as fat. When you eat or drink fewer calories than your body needs, your body burns fat to get the energy it needs.  Calorie counting means  keeping track of how many calories you eat and drink each day. Calorie counting can be helpful if you need to lose weight. If you eat fewer calories than your body needs, you should lose weight. Ask your health care provider what a healthy weight is for you.  For calorie counting to work, you will need to eat the right number of calories each day to lose a healthy amount of weight per week. A dietitian can help you figure out how many calories you need in a day and will suggest ways to reach your calorie goal.  · A healthy amount of weight to lose each week is usually 1-2 lb (0.5-0.9 kg). This usually means that your daily calorie intake should be reduced by 500-750 calories.  · Eating 1,200-1,500 calories a day can help most women lose weight.  · Eating 1,500-1,800 calories a day can help most men lose weight.  What do I need to know about calorie counting?  Work with your health care provider or dietitian to determine how many calories you should get each day. To meet your daily calorie goal, you will need to:  · Find out how many calories are in each food that you would like to eat. Try to do this before you eat.  · Decide how much of the food you plan to eat.  · Keep a food log. Do this by writing down what you ate and how many calories it had.  To successfully lose weight, it is important to balance calorie counting with a healthy lifestyle that includes regular activity.  Where do I find calorie information?    The number of calories in a food can be found on a Nutrition Facts label. If a food does not have a Nutrition Facts label, try to look up the calories online or ask your dietitian for help.  Remember that calories are listed per serving. If you choose to have more than one serving of a food, you will have to multiply the calories per serving by the number of servings you plan to eat. For example, the label on a package of bread might say that a serving size is 1 slice and that there are 90 calories in a  serving. If you eat 1 slice, you will have eaten 90 calories. If you eat 2 slices, you will have eaten 180 calories.  How do I keep a food log?  After each time that you eat, record the following in your food log as soon as possible:  · What you ate. Be sure to include toppings, sauces, and other extras on the food.  · How much you ate. This can be measured in cups, ounces, or number of items.  · How many calories were in each food and drink.  · The total number of calories in the food you ate.  Keep your food log near you, such as in a pocket-sized notebook or on an fina or website on your mobile phone. Some programs will calculate calories for you and show you how many calories you have left to meet your daily goal.  What are some portion-control tips?  · Know how many calories are in a serving. This will help you know how many servings you can have of a certain food.  · Use a measuring cup to measure serving sizes. You could also try weighing out portions on a kitchen scale. With time, you will be able to estimate serving sizes for some foods.  · Take time to put servings of different foods on your favorite plates or in your favorite bowls and cups so you know what a serving looks like.  · Try not to eat straight from a food's packaging, such as from a bag or box. Eating straight from the package makes it hard to see how much you are eating and can lead to overeating. Put the amount you would like to eat in a cup or on a plate to make sure you are eating the right portion.  · Use smaller plates, glasses, and bowls for smaller portions and to prevent overeating.  · Try not to multitask. For example, avoid watching TV or using your computer while eating. If it is time to eat, sit down at a table and enjoy your food. This will help you recognize when you are full. It will also help you be more mindful of what and how much you are eating.  What are tips for following this plan?  Reading food labels  · Check the  calorie count compared with the serving size. The serving size may be smaller than what you are used to eating.  · Check the source of the calories. Try to choose foods that are high in protein, fiber, and vitamins, and low in saturated fat, trans fat, and sodium.  Shopping  · Read nutrition labels while you shop. This will help you make healthy decisions about which foods to buy.  · Pay attention to nutrition labels for low-fat or fat-free foods. These foods sometimes have the same number of calories or more calories than the full-fat versions. They also often have added sugar, starch, or salt to make up for flavor that was removed with the fat.  · Make a grocery list of lower-calorie foods and stick to it.  Cooking  · Try to cook your favorite foods in a healthier way. For example, try baking instead of frying.  · Use low-fat dairy products.  Meal planning  · Use more fruits and vegetables. One-half of your plate should be fruits and vegetables.  · Include lean proteins, such as chicken, turkey, and fish.  Lifestyle  Each week, aim to do one of the following:  · 150 minutes of moderate exercise, such as walking.  · 75 minutes of vigorous exercise, such as running.  General information  · Know how many calories are in the foods you eat most often. This will help you calculate calorie counts faster.  · Find a way of tracking calories that works for you. Get creative. Try different apps or programs if writing down calories does not work for you.  What foods should I eat?    · Eat nutritious foods. It is better to have a nutritious, high-calorie food, such as an avocado, than a food with few nutrients, such as a bag of potato chips.  · Use your calories on foods and drinks that will fill you up and will not leave you hungry soon after eating.  ? Examples of foods that fill you up are nuts and nut butters, vegetables, lean proteins, and high-fiber foods such as whole grains. High-fiber foods are foods with more than 5  g of fiber per serving.  · Pay attention to calories in drinks. Low-calorie drinks include water and unsweetened drinks.  The items listed above may not be a complete list of foods and beverages you can eat. Contact a dietitian for more information.  What foods should I limit?  Limit foods or drinks that are not good sources of vitamins, minerals, or protein or that are high in unhealthy fats. These include:  · Candy.  · Other sweets.  · Sodas, specialty coffee drinks, alcohol, and juice.  The items listed above may not be a complete list of foods and beverages you should avoid. Contact a dietitian for more information.  How do I count calories when eating out?  · Pay attention to portions. Often, portions are much larger when eating out. Try these tips to keep portions smaller:  ? Consider sharing a meal instead of getting your own.  ? If you get your own meal, eat only half of it. Before you start eating, ask for a container and put half of your meal into it.  ? When available, consider ordering smaller portions from the menu instead of full portions.  · Pay attention to your food and drink choices. Knowing the way food is cooked and what is included with the meal can help you eat fewer calories.  ? If calories are listed on the menu, choose the lower-calorie options.  ? Choose dishes that include vegetables, fruits, whole grains, low-fat dairy products, and lean proteins.  ? Choose items that are boiled, broiled, grilled, or steamed. Avoid items that are buttered, battered, fried, or served with cream sauce. Items labeled as crispy are usually fried, unless stated otherwise.  ? Choose water, low-fat milk, unsweetened iced tea, or other drinks without added sugar. If you want an alcoholic beverage, choose a lower-calorie option, such as a glass of wine or light beer.  ? Ask for dressings, sauces, and syrups on the side. These are usually high in calories, so you should limit the amount you eat.  ? If you want a  salad, choose a garden salad and ask for grilled meats. Avoid extra toppings such as [place], cheese, or fried items. Ask for the dressing on the side, or ask for olive oil and vinegar or lemon to use as dressing.  · Estimate how many servings of a food you are given. Knowing serving sizes will help you be aware of how much food you are eating at restaurants.  Where to find more information  · Centers for Disease Control and Prevention: www.cdc.gov  · [place] Department of Agriculture: myplate.gov  Summary  · Calorie counting means keeping track of how many calories you eat and drink each day. If you eat fewer calories than your body needs, you should lose weight.  · A healthy amount of weight to lose per week is usually 1-2 lb (0.5-0.9 kg). This usually means reducing your daily calorie intake by 500-750 calories.  · The number of calories in a food can be found on a Nutrition Facts label. If a food does not have a Nutrition Facts label, try to look up the calories online or ask your dietitian for help.  · Use smaller plates, glasses, and bowls for smaller portions and to prevent overeating.  · Use your calories on foods and drinks that will fill you up and not leave you hungry shortly after a meal.  This information is not intended to replace advice given to you by your health care provider. Make sure you discuss any questions you have with your health care provider.  Document Revised: 01/28/2021 Document Reviewed: 01/28/2021  [employer] Patient Education © 2021 [employer]      Exercising to Lose Weight  Exercise is structured, repetitive physical activity to improve fitness and health. Getting regular exercise is important for everyone. It is especially important if you are overweight. Being overweight increases your risk of heart disease, stroke, diabetes, high blood pressure, and several types of cancer. Reducing your calorie intake and exercising can help you lose weight.  Exercise is usually categorized as  moderate or vigorous intensity. To lose weight, most people need to do a certain amount of moderate-intensity or vigorous-intensity exercise each week.  Moderate-intensity exercise    Moderate-intensity exercise is any activity that gets you moving enough to burn at least three times more energy (calories) than if you were sitting.  Examples of moderate exercise include:  · Walking a mile in 15 minutes.  · Doing light yard work.  · Biking at an easy pace.  Most people should get at least 150 minutes (2 hours and 30 minutes) a week of moderate-intensity exercise to maintain their body weight.  Vigorous-intensity exercise  Vigorous-intensity exercise is any activity that gets you moving enough to burn at least six times more calories than if you were sitting. When you exercise at this intensity, you should be working hard enough that you are not able to carry on a conversation.  Examples of vigorous exercise include:  · Running.  · Playing a team sport, such as football, basketball, and soccer.  · Jumping rope.  Most people should get at least 75 minutes (1 hour and 15 minutes) a week of vigorous-intensity exercise to maintain their body weight.  How can exercise affect me?  When you exercise enough to burn more calories than you eat, you lose weight. Exercise also reduces body fat and builds muscle. The more muscle you have, the more calories you burn. Exercise also:  · Improves mood.  · Reduces stress and tension.  · Improves your overall fitness, flexibility, and endurance.  · Increases bone strength.  The amount of exercise you need to lose weight depends on:  · Your age.  · The type of exercise.  · Any health conditions you have.  · Your overall physical ability.  Talk to your health care provider about how much exercise you need and what types of activities are safe for you.  What actions can I take to lose weight?  Nutrition    · Make changes to your diet as told by your health care provider or diet and  nutrition specialist (dietitian). This may include:  ? Eating fewer calories.  ? Eating more protein.  ? Eating less unhealthy fats.  ? Eating a diet that includes fresh fruits and vegetables, whole grains, low-fat dairy products, and lean protein.  ? Avoiding foods with added fat, salt, and sugar.  · Drink plenty of water while you exercise to prevent dehydration or heat stroke.    Activity  · Choose an activity that you enjoy and set realistic goals. Your health care provider can help you make an exercise plan that works for you.  · Exercise at a moderate or vigorous intensity most days of the week.  ? The intensity of exercise may vary from person to person. You can tell how intense a workout is for you by paying attention to your breathing and heartbeat. Most people will notice their breathing and heartbeat get faster with more intense exercise.  · Do resistance training twice each week, such as:  ? Push-ups.  ? Sit-ups.  ? Lifting weights.  ? Using resistance bands.  · Getting short amounts of exercise can be just as helpful as long structured periods of exercise. If you have trouble finding time to exercise, try to include exercise in your daily routine.  ? Get up, stretch, and walk around every 30 minutes throughout the day.  ? Go for a walk during your lunch break.  ? Park your car farther away from your destination.  ? If you take public transportation, get off one stop early and walk the rest of the way.  ? Make phone calls while standing up and walking around.  ? Take the stairs instead of elevators or escalators.  · Wear comfortable clothes and shoes with good support.  · Do not exercise so much that you hurt yourself, feel dizzy, or get very short of breath.  Where to find more information  · [place] Department of Health and Human Services: www.hhs.gov  · Centers for Disease Control and Prevention (CDC): www.cdc.gov  Contact a health care provider:  · Before starting a new exercise program.  · If you have  questions or concerns about your weight.  · If you have a medical problem that keeps you from exercising.  Get help right away if you have any of the following while exercising:  · Injury.  · Dizziness.  · Difficulty breathing or shortness of breath that does not go away when you stop exercising.  · Chest pain.  · Rapid heartbeat.  Summary  · Being overweight increases your risk of heart disease, stroke, diabetes, high blood pressure, and several types of cancer.  · Losing weight happens when you burn more calories than you eat.  · Reducing the amount of calories you eat in addition to getting regular moderate or vigorous exercise each week helps you lose weight.  This information is not intended to replace advice given to you by your health care provider. Make sure you discuss any questions you have with your health care provider.  Document Revised: 04/15/2021 Document Reviewed: 04/15/2021  [employer] Patient Education © 2021 [employer]

## 2021-11-03 NOTE — PROGRESS NOTES
The ABCs of the Annual Wellness Visit  Initial Medicare Wellness Visit    Chief Complaint   Patient presents with   • Medicare Wellness-subsequent   • Hypertension     f/u   • Hypothyroidism   • COPD       Subjective   History of Present Illness:  Tenisha Grayson is a 71 y.o. female who presents for an Initial Medicare Wellness Visit.    Very stressed with custody fight over marcos. Loses sleep and worries a lot.  We had discussed taking sertraline in January but she did not try it.  She does not feel she needs medication.  She feels very happy with her life overall.    Taking levothyroxine daily. Energy is good.      Taking phentermine to curb appetite for carbs and it helps a lot. No side effects.       CHRONIC CONDITIONS:    Blood pressures controlled on losartan.    For hyperlipidemia, she is eating less fats and carbohydrates in the diet.  She is exercising and walking more and losing some weight.    No trouble with shortness of breath, wheeze, or cough.    HEALTH RISK ASSESSMENT    Recent Hospitalizations:  No hospitalization(s) within the last year.    Current Medical Providers:  Patient Care Team:  Doris Canseco MD as PCP - General (Internal Medicine)  Deisy Sumner MD as Consulting Physician (Dermatology)    Smoking Status:  Social History     Tobacco Use   Smoking Status Current Some Day Smoker   • Packs/day: 1.00   • Years: 44.00   • Pack years: 44.00   • Types: Cigarettes   Smokeless Tobacco Never Used   Tobacco Comment    Stopped 8/7/17       Alcohol Consumption:  Social History     Substance and Sexual Activity   Alcohol Use Yes       Depression Screen:   PHQ-2/PHQ-9 Depression Screening 11/3/2021   Little interest or pleasure in doing things 0   Feeling down, depressed, or hopeless 1   Total Score 1       Fall Risk Screen:  STEADI Fall Risk Assessment was completed, and patient is at LOW risk for falls.Assessment completed on:11/3/2021    Health Habits and Functional and Cognitive  Screening:  Functional & Cognitive Status 11/3/2021   Do you have difficulty preparing food and eating? No   Do you have difficulty bathing yourself, getting dressed or grooming yourself? No   Do you have difficulty using the toilet? No   Do you have difficulty moving around from place to place? No   Do you have trouble with steps or getting out of a bed or a chair? No   Current Diet Well Balanced Diet   Dental Exam Up to date   Eye Exam Up to date   Exercise (times per week) 2 times per week   Current Exercises Include Walking   Current Exercise Activities Include -   Do you need help using the phone?  No   Are you deaf or do you have serious difficulty hearing?  No   Do you need help with transportation? No   Do you need help shopping? No   Do you need help preparing meals?  No   Do you need help with housework?  No   Do you need help with laundry? No   Do you need help taking your medications? No   Do you need help managing money? No   Do you ever drive or ride in a car without wearing a seat belt? No   Have you felt unusual stress, anger or loneliness in the last month? No   Who do you live with? Spouse   If you need help, do you have trouble finding someone available to you? No   Have you been bothered in the last four weeks by sexual problems? No   Do you have difficulty concentrating, remembering or making decisions? No       Does the patient have evidence of cognitive impairment? No    Asprin use counseling:Taking ASA appropriately as indicated    Age-appropriate Screening Schedule:  Refer to the list below for future screening recommendations based on patient's age, sex and/or medical conditions. Orders for these recommended tests are listed in the plan section. The patient has been provided with a written plan.      Age appropriate preventive counseling done including age appropriate vaccines,regular  Mammogram and self breast exam,colonoscopy, regular dental visits, mental health, injury prevention such  as wearing seat belt and preventing falls, healthy  nutrition, healthy weight, regular physical exercise. Alcohol use is moderate.  Tobacco history-none. Drug use-none.  STD's-not at risk.        Health Maintenance   Topic Date Due   • ZOSTER VACCINE (2 of 3) 07/22/2016   • INFLUENZA VACCINE  08/01/2021   • MAMMOGRAM  05/12/2022 (Originally 8/7/2020)   • DXA SCAN  05/12/2022 (Originally 8/7/2020)   • LIPID PANEL  10/29/2022   • TDAP/TD VACCINES (3 - Td or Tdap) 04/19/2028   • PAP SMEAR  Discontinued        The following portions of the patient's history were reviewed and updated as appropriate: allergies, current medications, past family history, past medical history, past social history, past surgical history and problem list.    Outpatient Medications Prior to Visit   Medication Sig Dispense Refill   • aspirin 325 MG tablet Take 325 mg by mouth Every Other Day.     • bisacodyl 5 MG EC tablet TAKE 3 TABLETS (15 MG) BY MOUTH DAILY AS NEEDED     • Calcium-Magnesium-Zinc 333-133-5 MG tablet Take 1 tablet by mouth Every 30 (Thirty) Days.     • Hormone Cream Base cream Apply 2 clicks (0.5mL) to inner arm or thigh each day  Estradiol/Estriol/Testosterone Versabase CR 0.2/0.8/1mg/0.5mL 15 g 5   • liothyronine (CYTOMEL) 5 MCG tablet TAKE 1 TABLET BY MOUTH DAILY. 90 tablet 1   • losartan (COZAAR) 50 MG tablet TAKE 1 TABLET BY MOUTH DAILY. 90 tablet 1   • MULTIPLE VITAMIN PO Take 1 tablet by mouth Daily.     • progesterone (PROMETRIUM) 200 MG capsule Take 2 capsules by mouth 1-2 hours before bedtime 180 capsule 1   • vitamin D3 125 MCG (5000 UT) capsule capsule Take 1 capsule by mouth Daily. When she remembers 30 capsule 11   • levothyroxine (SYNTHROID, LEVOTHROID) 100 MCG tablet TAKE 1 TABLET BY MOUTH DAILY. 90 tablet 1   • phentermine (ADIPEX-P) 37.5 MG tablet Take 1 tablet by mouth Daily.     • CBD (cannabidiol) oral oil Take 1 drop by mouth 2 (Two) Times a Day. Not QD     • sertraline (ZOLOFT) 25 MG tablet Take 1 tablet  by mouth Daily. 90 tablet 1   • Turmeric, Curcuma Longa, (Curcumin Extract) powder Curamed       No facility-administered medications prior to visit.       Patient Active Problem List   Diagnosis   • COPD, moderate (HCC)   • Adnexal mass   • Tobacco use   • Vitamin D deficiency   • Menopausal and postmenopausal disorder   • Acquired hypothyroidism   • Benign essential hypertension   • Other fatigue   • Left bundle branch block (LBBB) determined by electrocardiography   • Stress and adjustment reaction   • Screening for colon cancer   • Mixed hyperlipidemia   • Osteopenia of multiple sites   • Overweight with body mass index (BMI) of 28 to 28.9 in adult   • Diverticulosis   • Chronic constipation   • Medicare annual wellness visit, subsequent   • Annual physical exam   • Screening mammogram, encounter for       Advanced Care Planning:  ACP discussion was held with the patient during this visit. Patient has an advance directive (not in EMR), copy requested.    Review of Systems   Constitutional: Negative for chills, fatigue and fever.   HENT: Negative for congestion, ear pain, hearing loss and sinus pressure.    Eyes: Negative for visual disturbance.   Respiratory: Negative for cough, chest tightness, shortness of breath and wheezing.    Cardiovascular: Negative for chest pain, palpitations and leg swelling.   Gastrointestinal: Negative for abdominal pain, blood in stool and constipation.   Endocrine: Negative for cold intolerance and heat intolerance.   Genitourinary: Negative for dysuria and frequency.   Musculoskeletal: Negative for arthralgias, back pain and gait problem.   Skin: Negative for color change and rash.   Allergic/Immunologic: Negative for environmental allergies.   Neurological: Negative for dizziness and headaches.   Hematological: Negative for adenopathy. Does not bruise/bleed easily.   Psychiatric/Behavioral: Negative for dysphoric mood, sleep disturbance and suicidal ideas. The patient is not  "nervous/anxious.         High stress.       Compared to one year ago, the patient feels her physical health is the same.  Compared to one year ago, the patient feels her mental health is the same.    Reviewed chart for potential of high risk medication in the elderly: yes  Reviewed chart for potential of harmful drug interactions in the elderly:yes    Objective         Vitals:    11/03/21 1615   BP: 126/74   BP Location: Left arm   Patient Position: Sitting   Cuff Size: Adult   Pulse: 84   Temp: 98.6 °F (37 °C)   TempSrc: Temporal   Weight: 66.2 kg (146 lb)   Height: 152.4 cm (60\")   PainSc: 0-No pain     Patient's Body mass index is 28.51 kg/m². indicating that she is overweight (BMI 25-29.9). Obesity-related health conditions include the following: hypertension and dyslipidemias. Obesity is improving with treatment. BMI is is above average; BMI management plan is completed. We discussed low calorie, low carb based diet program, portion control and increasing exercise..    Body mass index is 28.51 kg/m².  Discussed the patient's BMI with her. The BMI is above average; BMI management plan is completed.  She will continue taking phentermine daily.    Physical Exam  Vitals and nursing note reviewed.   Constitutional:       Appearance: She is well-developed and overweight.   HENT:      Head: Normocephalic.   Eyes:      Conjunctiva/sclera: Conjunctivae normal.      Pupils: Pupils are equal, round, and reactive to light.   Neck:      Thyroid: No thyromegaly.   Cardiovascular:      Rate and Rhythm: Normal rate and regular rhythm.      Heart sounds: Normal heart sounds.   Pulmonary:      Effort: Pulmonary effort is normal.      Breath sounds: Normal breath sounds. No wheezing.   Chest:   Breasts:      Right: No inverted nipple, mass, nipple discharge, skin change or tenderness.      Left: No inverted nipple, mass, nipple discharge, skin change or tenderness.       Abdominal:      General: Bowel sounds are normal.      " Palpations: Abdomen is soft.      Tenderness: There is no abdominal tenderness.   Musculoskeletal:         General: No tenderness. Normal range of motion.      Cervical back: Normal range of motion and neck supple.   Lymphadenopathy:      Cervical: No cervical adenopathy.   Skin:     General: Skin is warm and dry.      Findings: No rash.   Neurological:      Mental Status: She is alert and oriented to person, place, and time.      Cranial Nerves: No cranial nerve deficit.      Sensory: No sensory deficit.      Coordination: Coordination normal.      Gait: Gait normal.   Psychiatric:         Attention and Perception: Attention normal.         Mood and Affect: Mood normal.         Speech: Speech normal.         Behavior: Behavior normal.         Thought Content: Thought content normal.         Cognition and Memory: Cognition normal.         Judgment: Judgment normal.            ECG 12 Lead    Date/Time: 11/3/2021 5:53 PM  Performed by: Doris Canseco MD  Authorized by: Doris Canseco MD   Comparison: compared with previous ECG   Similar to previous ECG  Rhythm: sinus rhythm  Rate: normal  BPM: 79  Conduction: left bundle branch block  ST Segments: ST segments normal  T Waves: T waves normal  QRS axis: left    Clinical impression: normal ECG              Lab Results   Component Value Date    CHLPL 234 (H) 10/29/2021    TRIG 134 10/29/2021    HDL 72 (H) 10/29/2021     (H) 10/29/2021    VLDL 23 10/29/2021        Assessment/Plan   Medicare Risks and Personalized Health Plan  CMS Preventative Services Quick Reference  Cardiovascular risk  Obesity/Overweight   Osteoporosis Risk    The above risks/problems have been discussed with the patient.  Pertinent information has been shared with the patient in the After Visit Summary.  Follow up plans and orders are seen below in the Assessment/Plan Section.  Patient Instructions   Problem List Items Addressed This Visit        Cardiac and Vasculature    Benign  essential hypertension    Overview     11/3/2021 Doris Canseco MD    Continue losartan 50 mg tablet daily.      Avoid salt in the diet.  Avoid sodas.    Continue to check blood pressures at home.  We would like it to stay 120s/70s or less.           Relevant Medications    losartan (COZAAR) 50 MG tablet    Other Relevant Orders    ECG 12 Lead    Left bundle branch block (LBBB) determined by electrocardiography    Overview     11/3/2021 Doris Canseco MD    EKG is unchanged.         Mixed hyperlipidemia    Overview     11/3/2021 Doris Canseco MD    Continue to increase exercise and walking.  Goal is to get 30 minutes of aerobic exercise a day 5 days a week.      Continue to improve low fat and low sugar diet.            Endocrine and Metabolic    Vitamin D deficiency    Overview     11/3/2021 Doris Canseco MD    Continue 2000 units of vitamin D3 daily.         Acquired hypothyroidism    Overview     11/3/2021 Doris Canseco MD    Continue levothyroxine 100 mcg daily.  Continue liothyronine 5 mcg tablet daily.             Relevant Medications    liothyronine (CYTOMEL) 5 MCG tablet    levothyroxine (SYNTHROID, LEVOTHROID) 100 MCG tablet       Health Encounters    Medicare annual wellness visit, subsequent    Overview     11/3/2021 Doris Canseco MD    Patient declines Mammogram and DEXA at present.     She will get her flu shot at her pharmacy.  She was also advised to get the shingles vaccine at her pharmacy.         Annual physical exam    Overview     11/3/2021 Doris Canseco MD    Patient declines Mammogram and DEXA at present.     She will get her flu shot at her pharmacy.  She was also advised to get the shingles vaccine at her pharmacy.            Musculoskeletal and Injuries    Osteopenia of multiple sites (Chronic)    Overview     11/3/2021 Doris Canseco MD    8/17/2018 DEXA showed normal spine T-scores, left femoral neck T score was -1.9, right femoral neck T score was -2.2.       Do some weightbearing exercises daily.  Try to walk some every day.  Also use small hand weights about 5 minutes a day when you are not at the gym.    Maintain a normal weight.  Avoid a diet high in animal protein.    Take 5000 units vitamin D daily.  Also take 500 to 600 mg of calcium daily.                Pulmonary and Pneumonias    COPD, moderate (HCC)    Overview     11/3/2021 Doris Canseco MD    Patient is asymptomatic at present.  She has been advised to totally abstain from smoking.            Tobacco    Tobacco use    Overview     11/3/2021 Doris Canseco MD    She has been advised to totally abstain from smoking to decrease heart disease risk and lung cancer risk.            Other    Overweight with body mass index (BMI) of 28 to 28.9 in adult - Primary (Chronic)    Overview     11/3/2021 Doris Canseco MD    Continue to increase regular exercise and walking.      Continue to improve low-fat and low sugar diet.  Eat smaller portions at mealtime.  Avoid snacking.    Weigh every Monday morning to monitor progress.         Relevant Medications    phentermine (ADIPEX-P) 37.5 MG tablet           Follow Up:  Return in about 6 months (around 5/3/2022) for recheck fasting.     An After Visit Summary and PPPS were given to the patient.

## 2021-12-17 ENCOUNTER — TELEPHONE (OUTPATIENT)
Dept: INTERNAL MEDICINE | Facility: CLINIC | Age: 71
End: 2021-12-17

## 2021-12-17 NOTE — TELEPHONE ENCOUNTER
Caller: Tenisha Grayson    Relationship: Self    Best call back number: [phone]     What was the call regarding: PATIENT CALLED STATING THAT HER POTASSIUM LEVEL HAS BEEN HIGH.  PATIENT WANTED TO CHECK AND SEE IF SHE SHOULD BE GETTING LOSARTAN WITH POTASSIUM.  PATIENT STATED THAT SHE IS GETTING BOTH TOGETHER FROM THE PHARMACY.     Do you require a callback: YES

## 2021-12-17 NOTE — TELEPHONE ENCOUNTER
Potassium level was a little high in October.  Her blood pressure looks good on losartan.  She is not taking any extra potassium.  There is a small potassium avoiding in the make-up of the losartan.  We do not need to change it unless potassium stays high.  She needs a follow-up visit with me for May.

## 2021-12-17 NOTE — TELEPHONE ENCOUNTER
Pt advised per Dr. Canseco, she verbalized understanding. She states that she will call back to scheduled her appt in May.

## 2022-01-22 DIAGNOSIS — E66.3 OVERWEIGHT WITH BODY MASS INDEX (BMI) OF 28 TO 28.9 IN ADULT: Chronic | ICD-10-CM

## 2022-01-24 RX ORDER — PHENTERMINE HYDROCHLORIDE 37.5 MG/1
37.5 TABLET ORAL DAILY
Qty: 30 TABLET | Refills: 0 | Status: SHIPPED | OUTPATIENT
Start: 2022-01-24 | End: 2022-04-11

## 2022-01-24 RX ORDER — LIOTHYRONINE SODIUM 5 UG/1
5 TABLET ORAL DAILY
Qty: 90 TABLET | Refills: 1 | Status: SHIPPED | OUTPATIENT
Start: 2022-01-24 | End: 2022-01-24 | Stop reason: SDUPTHER

## 2022-01-24 RX ORDER — LIOTHYRONINE SODIUM 5 UG/1
5 TABLET ORAL DAILY
Qty: 90 TABLET | Refills: 1 | Status: SHIPPED | OUTPATIENT
Start: 2022-01-24 | End: 2022-05-24 | Stop reason: SDUPTHER

## 2022-01-24 NOTE — TELEPHONE ENCOUNTER
Rx Refill Note  Requested Prescriptions     Pending Prescriptions Disp Refills   • liothyronine (CYTOMEL) 5 MCG tablet [Pharmacy Med Name: LIOTHYRONINE SODIUM 5 MCG T 5 Tablet] 90 tablet 1     Sig: TAKE 1 TABLET BY MOUTH DAILY.   • phentermine (ADIPEX-P) 37.5 MG tablet [Pharmacy Med Name: PHENTERMINE 37.5 MG TABLET 37.5 Tablet] 30 tablet 2     Sig: TAKE 1 TABLET BY MOUTH DAILY      Last office visit with prescribing clinician: 11/3/2021      Next office visit with prescribing clinician: Visit date not found     LA: 11/03/21 #30 2R    {TIP  Encounters:23}         Elisabet Polanco LPN  01/24/22, 09:08 EST

## 2022-02-28 RX ORDER — LEVOTHYROXINE SODIUM 0.1 MG/1
100 TABLET ORAL DAILY
Qty: 90 TABLET | Refills: 1 | Status: SHIPPED | OUTPATIENT
Start: 2022-02-28 | End: 2022-05-24 | Stop reason: SDUPTHER

## 2022-04-09 DIAGNOSIS — E66.3 OVERWEIGHT WITH BODY MASS INDEX (BMI) OF 28 TO 28.9 IN ADULT: Chronic | ICD-10-CM

## 2022-04-11 RX ORDER — PHENTERMINE HYDROCHLORIDE 37.5 MG/1
37.5 TABLET ORAL DAILY
Qty: 30 TABLET | Refills: 0 | Status: SHIPPED | OUTPATIENT
Start: 2022-04-11 | End: 2022-05-24 | Stop reason: SDUPTHER

## 2022-04-11 NOTE — TELEPHONE ENCOUNTER
Rx Refill Note  Requested Prescriptions     Pending Prescriptions Disp Refills   • phentermine (ADIPEX-P) 37.5 MG tablet [Pharmacy Med Name: PHENTERMINE 37.5 MG TABLET 37.5 Tablet] 30 tablet 0     Sig: TAKE 1 TABLET BY MOUTH DAILY      Last office visit with prescribing clinician: 11/03/21      Next office visit with prescribing clinician: Visit date not found       LA: 01/24/22 #30 0R             Elisabet Polanco LPN  04/11/22, 09:25 EDT

## 2022-04-14 ENCOUNTER — TELEPHONE (OUTPATIENT)
Dept: INTERNAL MEDICINE | Facility: CLINIC | Age: 72
End: 2022-04-14

## 2022-04-14 NOTE — TELEPHONE ENCOUNTER
Called patient and scheduled her for appointment with Dr. Canseco on 5/11/22.  Patient would like to have fasting lab orders put in system at least a week prior to appointment.  Call her when lab orders are in system.

## 2022-04-14 NOTE — TELEPHONE ENCOUNTER
Patient was called to make a fasting appointment for May.   Appointment was made for 5/11/22.  Patient wants to have her fasting lab orders in system a week prior to appointment and call her when they are in.

## 2022-05-06 ENCOUNTER — TELEPHONE (OUTPATIENT)
Dept: INTERNAL MEDICINE | Facility: CLINIC | Age: 72
End: 2022-05-06

## 2022-05-06 DIAGNOSIS — R82.90 CLOUDY URINE: Primary | ICD-10-CM

## 2022-05-06 NOTE — TELEPHONE ENCOUNTER
Pt states her urine has been cloudy and a little odor for a month -no other symptoms. She was drinking cranberry juice, but hasn't done this in about 2 weeks. She has an appt here 5/11

## 2022-05-06 NOTE — TELEPHONE ENCOUNTER
Caller: Tenisha Grayson    Relationship: Self    Best call back number: [phone]    What are your current symptoms:   UTI     How long have you been experiencing symptoms:   A MONTH     Have you had these symptoms before:    [x] Yes  [] No    Have you been treated for these symptoms before:   [x] Yes  [] No    If a prescription is needed, what is your preferred pharmacy and phone number:    [facility], [facility]. [place] [address] [address] - [phone] [facility] [phone]   [phone]    Additional notes:  PATIENT WOULD LIKE FOR A MEDICATION TO BE CALLED INTO THE PHARMACY FOR A UTI

## 2022-05-24 ENCOUNTER — OFFICE VISIT (OUTPATIENT)
Dept: INTERNAL MEDICINE | Facility: CLINIC | Age: 72
End: 2022-05-24

## 2022-05-24 VITALS
HEART RATE: 73 BPM | HEIGHT: 60 IN | BODY MASS INDEX: 27.64 KG/M2 | TEMPERATURE: 98 F | WEIGHT: 140.8 LBS | OXYGEN SATURATION: 96 % | DIASTOLIC BLOOD PRESSURE: 62 MMHG | RESPIRATION RATE: 17 BRPM | SYSTOLIC BLOOD PRESSURE: 124 MMHG

## 2022-05-24 DIAGNOSIS — E66.3 OVERWEIGHT WITH BODY MASS INDEX (BMI) OF 28 TO 28.9 IN ADULT: Chronic | ICD-10-CM

## 2022-05-24 DIAGNOSIS — E66.3 OVERWEIGHT WITH BODY MASS INDEX (BMI) OF 27 TO 27.9 IN ADULT: Chronic | ICD-10-CM

## 2022-05-24 DIAGNOSIS — E03.9 ACQUIRED HYPOTHYROIDISM: ICD-10-CM

## 2022-05-24 DIAGNOSIS — I10 BENIGN ESSENTIAL HYPERTENSION: Primary | ICD-10-CM

## 2022-05-24 DIAGNOSIS — N95.9 MENOPAUSAL AND POSTMENOPAUSAL DISORDER: ICD-10-CM

## 2022-05-24 DIAGNOSIS — E55.9 VITAMIN D DEFICIENCY: ICD-10-CM

## 2022-05-24 DIAGNOSIS — Z12.31 BREAST CANCER SCREENING BY MAMMOGRAM: ICD-10-CM

## 2022-05-24 DIAGNOSIS — M85.89 OSTEOPENIA OF MULTIPLE SITES: Chronic | ICD-10-CM

## 2022-05-24 DIAGNOSIS — E78.2 MIXED HYPERLIPIDEMIA: ICD-10-CM

## 2022-05-24 PROBLEM — M18.0 ARTHRITIS OF CARPOMETACARPAL (CMC) JOINT OF BOTH THUMBS: Chronic | Status: ACTIVE | Noted: 2022-05-24

## 2022-05-24 PROCEDURE — 99214 OFFICE O/P EST MOD 30 MIN: CPT | Performed by: INTERNAL MEDICINE

## 2022-05-24 RX ORDER — LIOTHYRONINE SODIUM 5 UG/1
5 TABLET ORAL DAILY
Qty: 90 TABLET | Refills: 1 | Status: SHIPPED | OUTPATIENT
Start: 2022-05-24 | End: 2023-01-26

## 2022-05-24 RX ORDER — PHENTERMINE HYDROCHLORIDE 37.5 MG/1
37.5 TABLET ORAL DAILY
Qty: 30 TABLET | Refills: 0 | Status: SHIPPED | OUTPATIENT
Start: 2022-05-24 | End: 2022-07-26

## 2022-05-24 RX ORDER — LOSARTAN POTASSIUM 50 MG/1
50 TABLET ORAL DAILY
Qty: 90 TABLET | Refills: 1 | Status: SHIPPED | OUTPATIENT
Start: 2022-05-24 | End: 2022-12-29

## 2022-05-24 RX ORDER — LEVOTHYROXINE SODIUM 0.1 MG/1
100 TABLET ORAL DAILY
Qty: 90 TABLET | Refills: 1 | Status: SHIPPED | OUTPATIENT
Start: 2022-05-24 | End: 2023-03-08 | Stop reason: SDUPTHER

## 2022-05-24 NOTE — PROGRESS NOTES
[facility]     Tenisha Grayson  1950   [id number]      Patient Care Team:  Doris Canseco MD as PCP - General (Internal Medicine)  Deisy Sumner MD as Consulting Physician (Dermatology)    CC:hypertension      HPI  The patient presents for a 6-month follow-up.    Hypertension   The patient's blood pressure is within normal range at 124/62 mmHg today. Her blood pressure has been overall well-controlled at home as well, but it is still elevated at times. She notes that her diet is normal for her. She usually does not try to avoid salt. She is currently taking losartan 50 mg daily. She requests a refill today.     Hypothyroidism  The patient had laboratory testing approximately 2 weeks ago. She is currently taking levothyroxine and liothyronine daily. She requests for refills today.     Vitamin D  The patient is taking vitamin D 5000 units daily.     Joint pain  She is taking regular aspirin every other day for joint pain. 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 It can be severely painful at times. She denies any shooting pains. She does use CBD, which does help to reduce the pain. She denies any other joint pain. The patient denies any swelling in her feet bilaterally. She denies any constipation. She does try to drink plenty of water.    Osteopenia  Her last bone density scan was in 2018. It showed mild bone density loss in the osteopenia range, not yet to osteoporosis. The worst area was the hip.      Heartburn  She does experience symptoms of heartburn or indigestion occasionally, but it does not happen very often.    Overweight  The patient states that she has been exercising. She has lost 6 pounds since her last visit. She has been taking phentermine intermittently. She does need refills for phentermine.     Vj maintenance  The patient is still taking progesterone. She is overdue for her mammogram. She has had the first COVID-19 booster. She is up-to-date on her  pneumonia and tetanus vaccine. She has not had the new shingles vaccine.        CHRONIC CONDITIONS      Past Medical History:   Diagnosis Date   • Abnormal EKG    • Atrophic vaginitis    • COPD (chronic obstructive pulmonary disease) (HCC) 1995   • Fen-phen exposure 1996    negative workup   • Fibroids 1991    MANJULA and BSO   • Hyperlipidemia    • Hypertension    • Hypothyroidism 2000   • Obesity, mild 1990   • Rectocele    • Thyroid disease    • Vitamin D deficiency disease        Past Surgical History:   Procedure Laterality Date   • CARDIAC CATHETERIZATION Left 2014    Normal coronaries   • COLONOSCOPY  2008    Normal study   • OTHER SURGICAL HISTORY  2013    Rectocele repair   • TOTAL ABDOMINAL HYSTERECTOMY WITH SALPINGO OOPHORECTOMY  1991    For fibroids and bleeding       Family History   Problem Relation Age of Onset   • COPD Mother    • Other Mother         Polio   • Osteoporosis Mother    • Heart attack Father         Cause of death, Non smoker   • Hypertension Father    • Breast cancer Sister 60   • Alcohol abuse Brother    • Suicidality Brother    • Thrombophlebitis Brother    • No Known Problems Brother    • Unexplained death Other    • Breast cancer Daughter 28   • Hypertension Other    • Coronary artery disease Other    • Ovarian cancer Neg Hx        Social History     Socioeconomic History   • Marital status:    Tobacco Use   • Smoking status: Current Some Day Smoker     Packs/day: 1.00     Years: 44.00     Pack years: 44.00     Types: Cigarettes   • Smokeless tobacco: Never Used   • Tobacco comment: Stopped 8/7/17   Substance and Sexual Activity   • Alcohol use: Yes   • Drug use: No   • Sexual activity: Defer       Allergies   Allergen Reactions   • Bupropion      Drowsiness       Review of Systems:     Review of Systems    Vital Signs  Vitals:    05/24/22 1559   BP: 124/62   BP Location: Left arm   Patient Position: Sitting   Cuff Size: Adult   Pulse: 73   Resp: 17   Temp: 98 °F (36.7 °C)  "  TempSrc: Temporal   SpO2: 96%   Weight: 63.9 kg (140 lb 12.8 oz)   Height: 152.4 cm (60\")   PainSc: 0-No pain     Body mass index is 27.5 kg/m².  BMI has not been calculated during today's encounter.         Current Outpatient Medications:   •  aspirin 325 MG tablet, Take 325 mg by mouth Every Other Day., Disp: , Rfl:   •  bisacodyl 5 MG EC tablet, TAKE 3 TABLETS (15 MG) BY MOUTH DAILY AS NEEDED, Disp: , Rfl:   •  Calcium-Magnesium-Zinc 333-133-5 MG tablet, Take 1 tablet by mouth Every 30 (Thirty) Days., Disp: , Rfl:   •  Hormone Cream Base cream, Apply 2 clicks (0.5mL) to inner arm or thigh each day  Estradiol/Estriol/Testosterone Versabase CR 0.2/0.8/1mg/0.5mL, Disp: 15 g, Rfl: 5  •  levothyroxine (SYNTHROID, LEVOTHROID) 100 MCG tablet, Take 1 tablet by mouth Daily., Disp: 90 tablet, Rfl: 1  •  liothyronine (CYTOMEL) 5 MCG tablet, Take 1 tablet by mouth Daily., Disp: 90 tablet, Rfl: 1  •  losartan (COZAAR) 50 MG tablet, Take 1 tablet by mouth Daily., Disp: 90 tablet, Rfl: 1  •  MULTIPLE VITAMIN PO, Take 1 tablet by mouth Daily., Disp: , Rfl:   •  phentermine (ADIPEX-P) 37.5 MG tablet, Take 1 tablet by mouth Daily., Disp: 30 tablet, Rfl: 0  •  progesterone (PROMETRIUM) 200 MG capsule, Take 2 capsules by mouth 1-2 hours before bedtime, Disp: 180 capsule, Rfl: 1  •  vitamin D3 125 MCG (5000 UT) capsule capsule, Take 1 capsule by mouth Daily. When she remembers, Disp: 30 capsule, Rfl: 11    Physical Exam:    Physical Exam  Vitals and nursing note reviewed.   Constitutional:       Appearance: She is well-developed and overweight.   HENT:      Head: Normocephalic.   Eyes:      Conjunctiva/sclera: Conjunctivae normal.      Pupils: Pupils are equal, round, and reactive to light.   Neck:      Thyroid: No thyromegaly.   Cardiovascular:      Rate and Rhythm: Normal rate and regular rhythm.      Heart sounds: Normal heart sounds.   Pulmonary:      Effort: Pulmonary effort is normal.      Breath sounds: Normal breath sounds. " No wheezing.   Musculoskeletal:         General: Normal range of motion.      Cervical back: Normal range of motion and neck supple.   Lymphadenopathy:      Cervical: No cervical adenopathy.   Skin:     General: Skin is warm and dry.   Neurological:      Mental Status: She is alert and oriented to person, place, and time.   Psychiatric:         Thought Content: Thought content normal.          ACE III MINI        Results Review:    None    CMP:  Lab Results   Component Value Date    BUN 16 10/29/2021    CREATININE 0.82 10/29/2021    EGFRIFNONA 69 10/29/2021    EGFRIFAFRI 84 10/29/2021    BCR 19.5 10/29/2021     10/29/2021    K 5.4 (H) 10/29/2021    CO2 24.1 10/29/2021    CALCIUM 9.9 10/29/2021    PROTENTOTREF 7.3 10/29/2021    ALBUMIN 4.30 10/29/2021    LABGLOBREF 3.0 10/29/2021    LABIL2 1.4 10/29/2021    BILITOT 0.3 10/29/2021    ALKPHOS 70 10/29/2021    AST 22 10/29/2021    ALT 25 10/29/2021     HbA1c:  Lab Results   Component Value Date    HGBA1C 5.8 06/24/2014     Microalbumin:  Lab Results   Component Value Date    MICROALBUR 41.7 11/09/2020     Lipid Panel  Lab Results   Component Value Date    CHOL 248 (H) 12/31/2020    TRIG 134 10/29/2021    HDL 72 (H) 10/29/2021     (H) 10/29/2021    AST 22 10/29/2021    ALT 25 10/29/2021       Medication Review: Medications reviewed and noted  Patient Instructions   Problem List Items Addressed This Visit        Cardiac and Vasculature    Benign essential hypertension - Primary    Overview     Taking losartan 50 mg tablet daily.                 Current Assessment & Plan     - Continue losartan daily.   - Decrease salt in the diet.   - Recommend using Mrs. Dash or other salt substitute and avoiding salty snacks.           Relevant Medications    losartan (COZAAR) 50 MG tablet    Mixed hyperlipidemia    Current Assessment & Plan     - Continue to decrease sugars and fats in the diet.   - Continue exercise.   - Continue working on weight loss.               Endocrine and Metabolic    Vitamin D deficiency    Current Assessment & Plan     - Continue current dose of vitamin D3 daily.           Acquired hypothyroidism    Overview     Taking levothyroxine 100 mcg daily and liothyronine 5 mcg tablet daily.               Current Assessment & Plan     - Continue current dose of levothyroxine and liothyronine daily.           Relevant Medications    liothyronine (CYTOMEL) 5 MCG tablet    levothyroxine (SYNTHROID, LEVOTHROID) 100 MCG tablet       Genitourinary and Reproductive     Menopausal and postmenopausal disorder    Overview     1/27/2020 Doris Canseco MD    Will send her hormone levels to Ms. Humphreys at [facility] to adjust her hormone dose.           Relevant Orders    DEXA Bone Density Axial       Musculoskeletal and Injuries    Osteopenia of multiple sites (Chronic)    Overview     8/17/2018 DEXA showed normal spine T-scores, left femoral neck T score was -1.9, right femoral neck T score was -2.2.                 Current Assessment & Plan     - Continue regular weightbearing exercises including walking and using small hand weights or resistance bands or upper body weight machines.   - Continue calcium and vitamin D3.   - DEXA ordered.              Other    Overweight with body mass index (BMI) of 27 to 27.9 in adult (Chronic)    Current Assessment & Plan     - She has lost 6 pounds over the last 6 months.   - She will continue to avoid sugar and fats in the diet.   - Continue phentermine daily to decrease appetite.   - Continue regular exercise.           Relevant Medications    phentermine (ADIPEX-P) 37.5 MG tablet      Other Visit Diagnoses     Breast cancer screening by mammogram        Relevant Orders    Mammo Screening Digital Tomosynthesis Bilateral With CAD    Overweight with body mass index (BMI) of 28 to 28.9 in adult  (Chronic)       Relevant Medications    phentermine (ADIPEX-P) 37.5 MG tablet             Diagnosis Plan   1. Benign  essential hypertension     2. Mixed hyperlipidemia     3. Acquired hypothyroidism     4. Vitamin D deficiency     5. Osteopenia of multiple sites     6. Overweight with body mass index (BMI) of 27 to 27.9 in adult     7. Menopausal and postmenopausal disorder  DEXA Bone Density Axial   8. Breast cancer screening by mammogram  Mammo Screening Digital Tomosynthesis Bilateral With CAD   9. Overweight with body mass index (BMI) of 28 to 28.9 in adult  phentermine (ADIPEX-P) 37.5 MG tablet           Plan of care reviewed with patient at the conclusion of today's visit. Education was provided regarding diagnosis, management, and any prescribed or recommended OTC medications.Patient verbalizes understanding of and agreement with management plan.         Transcribed from ambient dictation for Doris Canseco MD by Lorene Bustos.  05/25/22   09:01 EDT    Patient verbalized consent to the visit recording.    Doris Canseco MD

## 2022-05-25 NOTE — PATIENT INSTRUCTIONS
Patient Instructions   Problem List Items Addressed This Visit          Cardiac and Vasculature    Benign essential hypertension - Primary    Overview     Taking losartan 50 mg tablet daily.                 Current Assessment & Plan     - Continue losartan daily.   - Decrease salt in the diet.   - Recommend using Mrs. Dash or other salt substitute and avoiding salty snacks.           Relevant Medications    losartan (COZAAR) 50 MG tablet    Mixed hyperlipidemia    Current Assessment & Plan     - Continue to decrease sugars and fats in the diet.   - Continue exercise.   - Continue working on weight loss.              Endocrine and Metabolic    Vitamin D deficiency    Current Assessment & Plan     - Continue current dose of vitamin D3 daily.           Acquired hypothyroidism    Overview     Taking levothyroxine 100 mcg daily and liothyronine 5 mcg tablet daily.               Current Assessment & Plan     - Continue current dose of levothyroxine and liothyronine daily.           Relevant Medications    liothyronine (CYTOMEL) 5 MCG tablet    levothyroxine (SYNTHROID, LEVOTHROID) 100 MCG tablet       Genitourinary and Reproductive     Menopausal and postmenopausal disorder    Overview     1/27/2020 Doris Canseco MD    Will send her hormone levels to Ms. Humphreys at [facility] pharmacy to adjust her hormone dose.           Relevant Orders    DEXA Bone Density Axial       Musculoskeletal and Injuries    Osteopenia of multiple sites (Chronic)    Overview     8/17/2018 DEXA showed normal spine T-scores, left femoral neck T score was -1.9, right femoral neck T score was -2.2.                 Current Assessment & Plan     - Continue regular weightbearing exercises including walking and using small hand weights or resistance bands or upper body weight machines.   - Continue calcium and vitamin D3.   - DEXA ordered.              Other    Overweight with body mass index (BMI) of 27 to 27.9 in adult (Chronic)    Current  Assessment & Plan     - She has lost 6 pounds over the last 6 months.   - She will continue to avoid sugar and fats in the diet.   - Continue phentermine daily to decrease appetite.   - Continue regular exercise.           Relevant Medications    phentermine (ADIPEX-P) 37.5 MG tablet          Other Visit Diagnoses       Breast cancer screening by mammogram        Relevant Orders    Mammo Screening Digital Tomosynthesis Bilateral With CAD    Overweight with body mass index (BMI) of 28 to 28.9 in adult  (Chronic)       Relevant Medications    phentermine (ADIPEX-P) 37.5 MG tablet          BMI for Adults  What is BMI?  Body mass index (BMI) is a number that is calculated from a person's weight and height. BMI can help estimate how much of a person's weight is composed of fat. BMI does not measure body fat directly. Rather, it is an alternative to procedures that directly measure body fat, which can be difficult and expensive.  BMI can help identify people who may be at higher risk for certain medical problems.  What are BMI measurements used for?  BMI is used as a screening tool to identify possible weight problems. It helps determine whether a person is obese, overweight, a healthy weight, or underweight.  BMI is useful for:  Identifying a weight problem that may be related to a medical condition or may increase the risk for medical problems.  Promoting changes, such as changes in diet and exercise, to help reach a healthy weight. BMI screening can be repeated to see if these changes are working.  How is BMI calculated?  BMI involves measuring your weight in relation to your height. Both height and weight are measured, and the BMI is calculated from those numbers. This can be done either in English ([place] or metric measurements. Note that charts and online BMI calculators are available to help you find your BMI quickly and easily without having to do these calculations yourself.  To calculate your BMI in English  "([place] measurements:    Measure your weight in pounds (lb).  Multiply the number of pounds by 703.  For example, for a person who weighs 180 lb, multiply that number by 703, which equals 126,540.  Measure your height in inches. Then multiply that number by itself to get a measurement called \"inches squared.\"  For example, for a person who is 70 inches tall, the \"inches squared\" measurement is 70 inches x 70 inches, which equals 4,900 inches squared.  Divide the total from step 2 (number of lb x 703) by the total from step 3 (inches squared): 126,540 ÷ 4,900 = 25.8. This is your BMI.    To calculate your BMI in metric measurements:  Measure your weight in kilograms (kg).  Measure your height in meters (m). Then multiply that number by itself to get a measurement called \"meters squared.\"  For example, for a person who is 1.75 m tall, the \"meters squared\" measurement is 1.75 m x 1.75 m, which is equal to 3.1 meters squared.  Divide the number of kilograms (your weight) by the meters squared number. In this example: 70 ÷ 3.1 = 22.6. This is your BMI.  What do the results mean?  BMI charts are used to identify whether you are underweight, normal weight, overweight, or obese. The following guidelines will be used:  Underweight: BMI less than 18.5.  Normal weight: BMI between 18.5 and 24.9.  Overweight: BMI between 25 and 29.9.  Obese: BMI of 30 or above.  Keep these notes in mind:  Weight includes both fat and muscle, so someone with a muscular build, such as an athlete, may have a BMI that is higher than 24.9. In cases like these, BMI is not an accurate measure of body fat.  To determine if excess body fat is the cause of a BMI of 25 or higher, further assessments may need to be done by a health care provider.  BMI is usually interpreted in the same way for men and women.  Where to find more information  For more information about BMI, including tools to quickly calculate your BMI, go to these websites:  Centers for " Disease Control and Prevention: www.cdc.gov  American Heart Association: www.heart.org  National Heart, Lung, and Blood [place]: www.nhlbi.nih.gov  Summary  Body mass index (BMI) is a number that is calculated from a person's weight and height.  BMI may help estimate how much of a person's weight is composed of fat. BMI can help identify those who may be at higher risk for certain medical problems.  BMI can be measured using English measurements or metric measurements.  BMI charts are used to identify whether you are underweight, normal weight, overweight, or obese.  This information is not intended to replace advice given to you by your health care provider. Make sure you discuss any questions you have with your health care provider.  Document Revised: 09/09/2020 Document Reviewed: 07/17/2020  Elsevier Patient Education © 2021 [employer]

## 2022-05-25 NOTE — ASSESSMENT & PLAN NOTE
- Continue regular weightbearing exercises including walking and using small hand weights or resistance bands or upper body weight machines.   - Continue calcium and vitamin D3.   - DEXA ordered.

## 2022-05-25 NOTE — ASSESSMENT & PLAN NOTE
- She has lost 6 pounds over the last 6 months.   - She will continue to avoid sugar and fats in the diet.   - Continue phentermine daily to decrease appetite.   - Continue regular exercise.

## 2022-05-25 NOTE — ASSESSMENT & PLAN NOTE
- Continue losartan daily.   - Decrease salt in the diet.   - Recommend using Mrs. Dash or other salt substitute and avoiding salty snacks.

## 2022-05-28 DIAGNOSIS — E66.3 OVERWEIGHT WITH BODY MASS INDEX (BMI) OF 28 TO 28.9 IN ADULT: Chronic | ICD-10-CM

## 2022-05-31 RX ORDER — PHENTERMINE HYDROCHLORIDE 37.5 MG/1
37.5 TABLET ORAL DAILY
Qty: 30 TABLET | Refills: 0 | OUTPATIENT
Start: 2022-05-31

## 2022-07-26 DIAGNOSIS — E66.3 OVERWEIGHT WITH BODY MASS INDEX (BMI) OF 28 TO 28.9 IN ADULT: Chronic | ICD-10-CM

## 2022-07-26 RX ORDER — PHENTERMINE HYDROCHLORIDE 37.5 MG/1
37.5 TABLET ORAL DAILY
Qty: 30 TABLET | Refills: 0 | Status: SHIPPED | OUTPATIENT
Start: 2022-07-26 | End: 2023-02-06

## 2022-07-26 NOTE — TELEPHONE ENCOUNTER
Rx Refill Note  Requested Prescriptions     Pending Prescriptions Disp Refills   • phentermine (ADIPEX-P) 37.5 MG tablet [Pharmacy Med Name: PHENTERMINE 37.5 MG TABLET 37.5 Tablet] 30 tablet 0     Sig: TAKE 1 TABLET BY MOUTH DAILY.     Last refill:   5/24/22 #30/0  Last office visit with prescribing clinician: 5/24/2022      Next office visit with prescribing clinician: 1/17/2023            Brinda Carr RN  07/26/22, 15:22 EDT

## 2022-08-04 ENCOUNTER — LAB (OUTPATIENT)
Dept: LAB | Facility: HOSPITAL | Age: 72
End: 2022-08-04

## 2022-08-04 DIAGNOSIS — E55.9 VITAMIN D DEFICIENCY: ICD-10-CM

## 2022-08-04 DIAGNOSIS — E78.2 MIXED HYPERLIPIDEMIA: ICD-10-CM

## 2022-08-04 DIAGNOSIS — E03.9 ACQUIRED HYPOTHYROIDISM: ICD-10-CM

## 2022-08-04 DIAGNOSIS — I10 BENIGN ESSENTIAL HYPERTENSION: ICD-10-CM

## 2022-08-05 LAB
25(OH)D3+25(OH)D2 SERPL-MCNC: 43.9 NG/ML (ref 30–100)
ALBUMIN SERPL-MCNC: 4.4 G/DL (ref 3.5–5.2)
ALBUMIN/CREAT UR: 290 MG/G CREAT (ref 0–29)
ALBUMIN/GLOB SERPL: 1.9 G/DL
ALP SERPL-CCNC: 57 U/L (ref 39–117)
ALT SERPL-CCNC: 22 U/L (ref 1–33)
APPEARANCE UR: ABNORMAL
AST SERPL-CCNC: 18 U/L (ref 1–32)
BACTERIA #/AREA URNS HPF: ABNORMAL /HPF
BASOPHILS # BLD AUTO: 0.08 10*3/MM3 (ref 0–0.2)
BASOPHILS NFR BLD AUTO: 0.8 % (ref 0–1.5)
BILIRUB SERPL-MCNC: 0.2 MG/DL (ref 0–1.2)
BILIRUB UR QL STRIP: NEGATIVE
BUN SERPL-MCNC: 16 MG/DL (ref 8–23)
BUN/CREAT SERPL: 20 (ref 7–25)
CALCIUM SERPL-MCNC: 9.2 MG/DL (ref 8.6–10.5)
CASTS URNS MICRO: ABNORMAL
CHLORIDE SERPL-SCNC: 102 MMOL/L (ref 98–107)
CHOLEST SERPL-MCNC: 237 MG/DL (ref 0–200)
CO2 SERPL-SCNC: 26.8 MMOL/L (ref 22–29)
COLOR UR: YELLOW
CREAT SERPL-MCNC: 0.8 MG/DL (ref 0.57–1)
CREAT UR-MCNC: 41.1 MG/DL
EGFRCR SERPLBLD CKD-EPI 2021: 78.9 ML/MIN/1.73
EOSINOPHIL # BLD AUTO: 0.48 10*3/MM3 (ref 0–0.4)
EOSINOPHIL NFR BLD AUTO: 5 % (ref 0.3–6.2)
EPI CELLS #/AREA URNS HPF: ABNORMAL /HPF
ERYTHROCYTE [DISTWIDTH] IN BLOOD BY AUTOMATED COUNT: 11 % (ref 12.3–15.4)
GLOBULIN SER CALC-MCNC: 2.3 GM/DL
GLUCOSE SERPL-MCNC: 101 MG/DL (ref 65–99)
GLUCOSE UR QL STRIP: NEGATIVE
HCT VFR BLD AUTO: 43.2 % (ref 34–46.6)
HDLC SERPL-MCNC: 72 MG/DL (ref 40–60)
HGB BLD-MCNC: 14.8 G/DL (ref 12–15.9)
HGB UR QL STRIP: ABNORMAL
IMM GRANULOCYTES # BLD AUTO: 0.02 10*3/MM3 (ref 0–0.05)
IMM GRANULOCYTES NFR BLD AUTO: 0.2 % (ref 0–0.5)
KETONES UR QL STRIP: NEGATIVE
LDLC SERPL CALC-MCNC: 139 MG/DL (ref 0–100)
LEUKOCYTE ESTERASE UR QL STRIP: ABNORMAL
LYMPHOCYTES # BLD AUTO: 1.69 10*3/MM3 (ref 0.7–3.1)
LYMPHOCYTES NFR BLD AUTO: 17.6 % (ref 19.6–45.3)
MCH RBC QN AUTO: 33.7 PG (ref 26.6–33)
MCHC RBC AUTO-ENTMCNC: 34.3 G/DL (ref 31.5–35.7)
MCV RBC AUTO: 98.4 FL (ref 79–97)
MICROALBUMIN UR-MCNC: 119.3 UG/ML
MONOCYTES # BLD AUTO: 0.8 10*3/MM3 (ref 0.1–0.9)
MONOCYTES NFR BLD AUTO: 8.4 % (ref 5–12)
NEUTROPHILS # BLD AUTO: 6.51 10*3/MM3 (ref 1.7–7)
NEUTROPHILS NFR BLD AUTO: 68 % (ref 42.7–76)
NITRITE UR QL STRIP: POSITIVE
NRBC BLD AUTO-RTO: 0 /100 WBC (ref 0–0.2)
PH UR STRIP: 7.5 [PH] (ref 5–8)
PLATELET # BLD AUTO: 367 10*3/MM3 (ref 140–450)
POTASSIUM SERPL-SCNC: 5 MMOL/L (ref 3.5–5.2)
PROT SERPL-MCNC: 6.7 G/DL (ref 6–8.5)
PROT UR QL STRIP: ABNORMAL
RBC # BLD AUTO: 4.39 10*6/MM3 (ref 3.77–5.28)
RBC #/AREA URNS HPF: ABNORMAL /HPF
SODIUM SERPL-SCNC: 139 MMOL/L (ref 136–145)
SP GR UR STRIP: 1.01 (ref 1–1.03)
T4 FREE SERPL-MCNC: 1.69 NG/DL (ref 0.93–1.7)
TRIGL SERPL-MCNC: 150 MG/DL (ref 0–150)
TSH SERPL DL<=0.005 MIU/L-ACNC: 2.32 UIU/ML (ref 0.27–4.2)
UROBILINOGEN UR STRIP-MCNC: ABNORMAL MG/DL
VLDLC SERPL CALC-MCNC: 26 MG/DL (ref 5–40)
WBC # BLD AUTO: 9.58 10*3/MM3 (ref 3.4–10.8)
WBC #/AREA URNS HPF: ABNORMAL /HPF

## 2022-08-06 DIAGNOSIS — R80.9 MICROPROTEINURIA: ICD-10-CM

## 2022-08-06 DIAGNOSIS — E78.2 MIXED HYPERLIPIDEMIA: Primary | ICD-10-CM

## 2022-08-06 RX ORDER — ROSUVASTATIN CALCIUM 5 MG/1
5 TABLET, COATED ORAL NIGHTLY
Qty: 90 TABLET | Refills: 1 | Status: SHIPPED | OUTPATIENT
Start: 2022-08-06 | End: 2023-03-06

## 2022-08-08 ENCOUNTER — TELEPHONE (OUTPATIENT)
Dept: INTERNAL MEDICINE | Facility: CLINIC | Age: 72
End: 2022-08-08

## 2022-08-08 NOTE — TELEPHONE ENCOUNTER
Pt returned Rupinder's call.  I read her Dr Canseco's note below & she understood.  Told her that Rupinder would be mailing out her lab results.

## 2022-08-08 NOTE — TELEPHONE ENCOUNTER
WHITLEY for pt to call back.    ----- Message from Doris Canseco MD sent at 8/6/2022 10:31 AM EDT -----  Please call patient on Monday to make sure she got her prescriptions from [facility] then mail her lab letter.    Vitamin D level is improving.  Continue current dose.    Microalbumin to creatinine ratio is high showing that there is protein loss in the urine.  Kidney function as measured by the blood is good.  We will recheck urinalysis when you go by to recheck the fasting cholesterol.    LDL (bad cholesterol) is elevated at 139.  Goal is less than 100 to prevent heart disease.  HDL (good cholesterol) is very good at 72.  Goal is greater than 50 in women.  Triglycerides are good at 150.  Goal is less than 150.    Lets start a low-dose of Crestor (rosuvastatin).  It has less risk of causing muscle aching and cramping.  You may start with 1/2 tablet every evening if you wish.  Also take co-Q10 which is an antioxidant that decreases the risk of side effects from statins.  Take it every day.  I am sending prescriptions to [facility].  Go by any [facility] lab any day and about 6 weeks to recheck fasting lipids.    Thyroid levels, liver enzymes, blood cell counts, are all acceptable.

## 2022-11-01 ENCOUNTER — TELEMEDICINE (OUTPATIENT)
Dept: INTERNAL MEDICINE | Facility: CLINIC | Age: 72
End: 2022-11-01

## 2022-11-01 DIAGNOSIS — B02.9 HERPES ZOSTER WITHOUT COMPLICATION: Primary | ICD-10-CM

## 2022-11-01 DIAGNOSIS — I10 BENIGN ESSENTIAL HYPERTENSION: ICD-10-CM

## 2022-11-01 DIAGNOSIS — R21 RASH: Chronic | ICD-10-CM

## 2022-11-01 DIAGNOSIS — E78.2 MIXED HYPERLIPIDEMIA: ICD-10-CM

## 2022-11-01 PROCEDURE — 99214 OFFICE O/P EST MOD 30 MIN: CPT | Performed by: INTERNAL MEDICINE

## 2022-11-01 RX ORDER — NORTRIPTYLINE HYDROCHLORIDE 10 MG/1
10 CAPSULE ORAL NIGHTLY
Qty: 30 CAPSULE | Refills: 2 | Status: SHIPPED | OUTPATIENT
Start: 2022-11-01 | End: 2023-03-08

## 2022-11-01 RX ORDER — VALACYCLOVIR HYDROCHLORIDE 1 G/1
1000 TABLET, FILM COATED ORAL 2 TIMES DAILY
Qty: 14 TABLET | Refills: 0 | Status: SHIPPED | OUTPATIENT
Start: 2022-11-01 | End: 2022-11-08

## 2022-11-01 RX ORDER — GABAPENTIN 100 MG/1
100 CAPSULE ORAL NIGHTLY
Qty: 30 CAPSULE | Refills: 2 | Status: SHIPPED | OUTPATIENT
Start: 2022-11-01 | End: 2023-03-08

## 2022-11-01 NOTE — ASSESSMENT & PLAN NOTE
Rash does appear to be a shingles rash and is mainly on the right side of the neck, upper chest, and upper back. There are vesicular lesions that are red. They are scattered. She does have a few lesions on the left side of the neck and upper back, but this is still favored as a shingles rash. The patient will take valacyclovir twice per day for 7 days. She will also take nortriptyline 10 mg every evening. The patient will always take also take gabapentin 100 mg every evening. She was instructed to continue them until she feels the pain has resolved. The patient was instructed to avoid scratching the rash. She should try Benadryl spray or Benadryl cream as needed to calm the itching. The patient may also use a cold pack.

## 2022-11-01 NOTE — ASSESSMENT & PLAN NOTE
The patient has not been taking rosuvastatin that was sent to her pharmacy in 08/2022. She was advised to start taking it 1 tablet every week and then gradually work up to 2 tablets per week, then 3 tablets per week, etc. until she gets to 1 tablet every evening. The patient will continue taking CoQ10 400 mg capsule nightly. She will also continue to increase exercise and decrease fats and sugars in the diet.

## 2022-11-01 NOTE — PROGRESS NOTES
[facility]     Tenisha Grayson  1950   [id number]      Patient Care Team:  Doris Canseco MD as PCP - General (Internal Medicine)  Deisy Sumner MD as Consulting Physician (Dermatology)    CC:rash         HPI  Patient is a 72 y.o. female presents with rash on right  neck chest upper back and some on left  for 2 days.  There are blisters on some of the lesions.  They were burning and painful when they started.  They are itching.  She is scratching a lot.  She has even scratched them with a hairbrush.  No fever, chills, myalgias, loss of appetite.  No GI symptoms.    Had a fever blister 5 days ago.      CHRONIC CONDITIONS    She is taking losartan daily for hypertension.  She has not checked the blood pressure recently but the last time she checked it was good.    For hyperlipidemia, she did not start the rosuvastatin in August.  She is concerned about side effects.  Her LDL was 139.  She does get some exercise and tries to eat a healthy diet.  She is taking co-Q10.      Past Medical History:   Diagnosis Date   • Abnormal EKG    • Atrophic vaginitis    • COPD (chronic obstructive pulmonary disease) (HCC) 1995   • Fen-phen exposure 1996    negative workup   • Fibroids 1991    MANJULA and BSO   • Hyperlipidemia    • Hypertension    • Hypothyroidism 2000   • Obesity, mild 1990   • Rectocele    • Thyroid disease    • Vitamin D deficiency disease        Past Surgical History:   Procedure Laterality Date   • CARDIAC CATHETERIZATION Left 2014    Normal coronaries   • COLONOSCOPY  2008    Normal study   • OTHER SURGICAL HISTORY  2013    Rectocele repair   • TOTAL ABDOMINAL HYSTERECTOMY WITH SALPINGO OOPHORECTOMY  1991    For fibroids and bleeding       Family History   Problem Relation Age of Onset   • COPD Mother    • Other Mother         Polio   • Osteoporosis Mother    • Heart attack Father         Cause of death, Non smoker   • Hypertension Father    • Breast cancer Sister 60   • Alcohol abuse  Brother    • Suicidality Brother    • Thrombophlebitis Brother    • No Known Problems Brother    • Unexplained death Other    • Breast cancer Daughter 28   • Hypertension Other    • Coronary artery disease Other    • Ovarian cancer Neg Hx        Social History     Socioeconomic History   • Marital status:    Tobacco Use   • Smoking status: Some Days     Packs/day: 1.00     Years: 44.00     Pack years: 44.00     Types: Cigarettes   • Smokeless tobacco: Never   • Tobacco comments:     Stopped 8/7/17   Substance and Sexual Activity   • Alcohol use: Yes   • Drug use: No   • Sexual activity: Defer       Allergies   Allergen Reactions   • Bupropion      Drowsiness         Vital Signs  There were no vitals filed for this visit.  There is no height or weight on file to calculate BMI.  BMI is >= 25 and <30. (Overweight) The following options were offered after discussion;: exercise counseling/recommendations and nutrition counseling/recommendations        Current Outpatient Medications:   •  aspirin 325 MG tablet, Take 325 mg by mouth Every Other Day., Disp: , Rfl:   •  bisacodyl 5 MG EC tablet, TAKE 3 TABLETS (15 MG) BY MOUTH DAILY AS NEEDED, Disp: , Rfl:   •  Calcium-Magnesium-Zinc 333-133-5 MG tablet, Take 1 tablet by mouth Every 30 (Thirty) Days., Disp: , Rfl:   •  Coenzyme Q10 400 MG capsule, Take 1 capsule by mouth Every Night., Disp: 90 capsule, Rfl: 3  •  gabapentin (NEURONTIN) 100 MG capsule, Take 1 capsule by mouth Every Night., Disp: 30 capsule, Rfl: 2  •  Hormone Cream Base cream, Apply 2 clicks (0.5mL) to inner arm or thigh each day  Estradiol/Estriol/Testosterone Versabase CR 0.2/0.8/1mg/0.5mL, Disp: 15 g, Rfl: 5  •  levothyroxine (SYNTHROID, LEVOTHROID) 100 MCG tablet, Take 1 tablet by mouth Daily., Disp: 90 tablet, Rfl: 1  •  liothyronine (CYTOMEL) 5 MCG tablet, Take 1 tablet by mouth Daily., Disp: 90 tablet, Rfl: 1  •  losartan (COZAAR) 50 MG tablet, Take 1 tablet by mouth Daily., Disp: 90 tablet,  Rfl: 1  •  MULTIPLE VITAMIN PO, Take 1 tablet by mouth Daily., Disp: , Rfl:   •  nortriptyline (PAMELOR) 10 MG capsule, Take 1 capsule by mouth Every Night., Disp: 30 capsule, Rfl: 2  •  phentermine (ADIPEX-P) 37.5 MG tablet, TAKE 1 TABLET BY MOUTH DAILY., Disp: 30 tablet, Rfl: 0  •  progesterone (PROMETRIUM) 200 MG capsule, Take 2 capsules by mouth 1-2 hours before bedtime, Disp: 180 capsule, Rfl: 1  •  rosuvastatin (CRESTOR) 5 MG tablet, Take 1 tablet by mouth Every Night., Disp: 90 tablet, Rfl: 1  •  valACYclovir (VALTREX) 1000 MG tablet, Take 1 tablet by mouth 2 (Two) Times a Day for 7 days., Disp: 14 tablet, Rfl: 0  •  vitamin D3 125 MCG (5000 UT) capsule capsule, Take 1 capsule by mouth Daily. When she remembers, Disp: 30 capsule, Rfl: 11    Physical Exam:    Physical Exam  Constitutional:       General: She is not in acute distress.     Appearance: She is not ill-appearing.   HENT:      Head: Normocephalic.   Eyes:      Extraocular Movements: Extraocular movements intact.      Conjunctiva/sclera: Conjunctivae normal.      Pupils: Pupils are equal, round, and reactive to light.   Pulmonary:      Effort: Pulmonary effort is normal.   Skin:     Findings: Rash present. Rash is vesicular.          Neurological:      General: No focal deficit present.      Mental Status: She is alert.   Psychiatric:         Mood and Affect: Mood normal.          ACE III MINI        Results Review:    None    CMP:  Lab Results   Component Value Date    BUN 16 08/04/2022    CREATININE 0.80 08/04/2022    EGFRIFNONA 69 10/29/2021    EGFRIFAFRI 84 10/29/2021    BCR 20.0 08/04/2022     08/04/2022    K 5.0 08/04/2022    CO2 26.8 08/04/2022    CALCIUM 9.2 08/04/2022    PROTENTOTREF 6.7 08/04/2022    ALBUMIN 4.40 08/04/2022    LABGLOBREF 2.3 08/04/2022    LABIL2 1.9 08/04/2022    BILITOT 0.2 08/04/2022    ALKPHOS 57 08/04/2022    AST 18 08/04/2022    ALT 22 08/04/2022     HbA1c:  Lab Results   Component Value Date    HGBA1C 5.8  06/24/2014     Microalbumin:  Lab Results   Component Value Date    MICROALBUR 119.3 08/04/2022     Lipid Panel  Lab Results   Component Value Date    CHOL 248 (H) 12/31/2020    TRIG 150 08/04/2022    HDL 72 (H) 08/04/2022     (H) 08/04/2022    AST 18 08/04/2022    ALT 22 08/04/2022       Medication Review: Medications reviewed and noted  Patient Instructions   Problem List Items Addressed This Visit        Cardiac and Vasculature    Benign essential hypertension    Overview     Taking losartan 50 mg tablet daily.               Current Assessment & Plan     She will continue taking losartan daily.         Relevant Medications    losartan (COZAAR) 50 MG tablet    Mixed hyperlipidemia    Current Assessment & Plan     The patient has not been taking rosuvastatin that was sent to her pharmacy in 08/2022. She was advised to start taking it 1 tablet every week and then gradually work up to 2 tablets per week, then 3 tablets per week, etc. until she gets to 1 tablet every evening. The patient will continue taking CoQ10 400 mg capsule nightly. She will also continue to increase exercise and decrease fats and sugars in the diet.         Relevant Medications    rosuvastatin (CRESTOR) 5 MG tablet       Infectious Diseases    Herpes zoster without complication - Primary    Current Assessment & Plan     Rash does appear to be a shingles rash and is mainly on the right side of the neck, upper chest, and upper back. There are vesicular lesions that are red. They are scattered. She does have a few lesions on the left side of the neck and upper back, but this is still favored as a shingles rash. The patient will take valacyclovir twice per day for 7 days. She will also take nortriptyline 10 mg every evening. The patient will always take also take gabapentin 100 mg every evening. She was instructed to continue them until she feels the pain has resolved. The patient was instructed to avoid scratching the rash. She should try  Benadryl spray or Benadryl cream as needed to calm the itching. The patient may also use a cold pack.         Relevant Medications    valACYclovir (VALTREX) 1000 MG tablet    gabapentin (NEURONTIN) 100 MG capsule       Skin    Rash (Chronic)    Current Assessment & Plan     Rash does appear to be a shingles rash and is mainly on the right side of the neck, upper chest, and upper back. There are vesicular lesions that are red. They are scattered. She does have a few lesions on the left side of the neck and upper back, but this is still favored as a shingles rash. The patient will take valacyclovir twice per day for 7 days. She will also take nortriptyline 10 mg every evening. The patient will always take also take gabapentin 100 mg every evening. She was instructed to continue them until she feels the pain has resolved. The patient was instructed to avoid scratching the rash. She should try Benadryl spray or Benadryl cream as needed to calm the itching. The patient may also use a cold pack.               Diagnosis Plan   1. Herpes zoster without complication  gabapentin (NEURONTIN) 100 MG capsule      2. Rash        3. Benign essential hypertension        4. Mixed hyperlipidemia          Follow-up: January for AWV        Plan of care reviewed with patient at the conclusion of today's visit. Education was provided regarding diagnosis, management, and any prescribed or recommended OTC medications.Patient verbalizes understanding of and agreement with management plan.         Doris Canseco MD    Transcribed from ambient dictation for Doris Canseco MD by Karrie Way.  11/01/22   14:43 EDT    Patient or patient representative verbalized consent to the visit recording.  I have personally performed the services described in this document as transcribed by the above individual, and it is both accurate and complete.

## 2022-11-01 NOTE — PATIENT INSTRUCTIONS
Patient Instructions   Problem List Items Addressed This Visit          Cardiac and Vasculature    Benign essential hypertension    Overview     Taking losartan 50 mg tablet daily.               Current Assessment & Plan     She will continue taking losartan daily.         Relevant Medications    losartan (COZAAR) 50 MG tablet    Mixed hyperlipidemia    Current Assessment & Plan     The patient has not been taking rosuvastatin that was sent to her pharmacy in 08/2022. She was advised to start taking it 1 tablet every week and then gradually work up to 2 tablets per week, then 3 tablets per week, etc. until she gets to 1 tablet every evening. The patient will continue taking CoQ10 400 mg capsule nightly. She will also continue to increase exercise and decrease fats and sugars in the diet.         Relevant Medications    rosuvastatin (CRESTOR) 5 MG tablet       Infectious Diseases    Herpes zoster without complication - Primary    Current Assessment & Plan     Rash does appear to be a shingles rash and is mainly on the right side of the neck, upper chest, and upper back. There are vesicular lesions that are red. They are scattered. She does have a few lesions on the left side of the neck and upper back, but this is still favored as a shingles rash. The patient will take valacyclovir twice per day for 7 days. She will also take nortriptyline 10 mg every evening. The patient will always take also take gabapentin 100 mg every evening. She was instructed to continue them until she feels the pain has resolved. The patient was instructed to avoid scratching the rash. She should try Benadryl spray or Benadryl cream as needed to calm the itching. The patient may also use a cold pack.         Relevant Medications    valACYclovir (VALTREX) 1000 MG tablet    gabapentin (NEURONTIN) 100 MG capsule       Skin    Rash (Chronic)    Current Assessment & Plan     Rash does appear to be a shingles rash and is mainly on the right side of  the neck, upper chest, and upper back. There are vesicular lesions that are red. They are scattered. She does have a few lesions on the left side of the neck and upper back, but this is still favored as a shingles rash. The patient will take valacyclovir twice per day for 7 days. She will also take nortriptyline 10 mg every evening. The patient will always take also take gabapentin 100 mg every evening. She was instructed to continue them until she feels the pain has resolved. The patient was instructed to avoid scratching the rash. She should try Benadryl spray or Benadryl cream as needed to calm the itching. The patient may also use a cold pack.

## 2022-11-14 ENCOUNTER — TELEPHONE (OUTPATIENT)
Dept: INTERNAL MEDICINE | Facility: CLINIC | Age: 72
End: 2022-11-14

## 2022-11-14 NOTE — TELEPHONE ENCOUNTER
Caller: Tenisha Grayson     Relationship: Emergency Contact     Best call back number: [phone]     What orders are you requesting (i.e. lab or imaging): BLOOD WORK      In what timeframe would the patient need to come in: ASAP     Where will you receive your lab/imaging services: [facility]     Additional notes: BLOOD WORK ORDER REQUEST      PATIENT WOULD LIKE A CALL WHEN ORDER IS PLACED

## 2022-11-15 ENCOUNTER — APPOINTMENT (OUTPATIENT)
Dept: MAMMOGRAPHY | Facility: HOSPITAL | Age: 72
End: 2022-11-15

## 2022-12-29 RX ORDER — LOSARTAN POTASSIUM 50 MG/1
TABLET ORAL
Qty: 90 TABLET | Refills: 1 | Status: SHIPPED | OUTPATIENT
Start: 2022-12-29 | End: 2023-03-08 | Stop reason: SDUPTHER

## 2023-01-26 RX ORDER — LIOTHYRONINE SODIUM 5 UG/1
5 TABLET ORAL DAILY
Qty: 90 TABLET | Refills: 1 | Status: SHIPPED | OUTPATIENT
Start: 2023-01-26

## 2023-02-06 DIAGNOSIS — E66.3 OVERWEIGHT WITH BODY MASS INDEX (BMI) OF 28 TO 28.9 IN ADULT: Chronic | ICD-10-CM

## 2023-02-06 RX ORDER — PHENTERMINE HYDROCHLORIDE 37.5 MG/1
37.5 TABLET ORAL DAILY
Qty: 30 TABLET | Refills: 0 | Status: SHIPPED | OUTPATIENT
Start: 2023-02-06

## 2023-02-06 NOTE — TELEPHONE ENCOUNTER
Rx Refill Note  Requested Prescriptions     Pending Prescriptions Disp Refills   • phentermine (ADIPEX-P) 37.5 MG tablet [Pharmacy Med Name: PHENTERMINE 37.5 MG TAB 37.5 Tablet] 30 tablet 0     Sig: TAKE 1 TABLET BY MOUTH DAILY     Last refill:   7/26/22 #30/0-  Last office visit with prescribing clinician: 5/24/2022   Last telemedicine visit with prescribing clinician: 2/20/2023   Next office visit with prescribing clinician: 2/20/2023                         Brinda Carr RN  02/06/23, 14:31 EST

## 2023-03-06 RX ORDER — ROSUVASTATIN CALCIUM 5 MG/1
5 TABLET, COATED ORAL NIGHTLY
Qty: 90 TABLET | Refills: 1 | Status: SHIPPED | OUTPATIENT
Start: 2023-03-06

## 2023-03-07 ENCOUNTER — TELEPHONE (OUTPATIENT)
Dept: INTERNAL MEDICINE | Facility: CLINIC | Age: 73
End: 2023-03-07

## 2023-03-08 ENCOUNTER — OFFICE VISIT (OUTPATIENT)
Dept: INTERNAL MEDICINE | Facility: CLINIC | Age: 73
End: 2023-03-08
Payer: MEDICARE

## 2023-03-08 VITALS
HEART RATE: 74 BPM | OXYGEN SATURATION: 98 % | DIASTOLIC BLOOD PRESSURE: 64 MMHG | TEMPERATURE: 98 F | HEIGHT: 61 IN | WEIGHT: 147.8 LBS | SYSTOLIC BLOOD PRESSURE: 148 MMHG | BODY MASS INDEX: 27.9 KG/M2

## 2023-03-08 DIAGNOSIS — E55.9 VITAMIN D DEFICIENCY: ICD-10-CM

## 2023-03-08 DIAGNOSIS — K59.09 CHRONIC CONSTIPATION: Chronic | ICD-10-CM

## 2023-03-08 DIAGNOSIS — N95.9 MENOPAUSAL AND POSTMENOPAUSAL DISORDER: ICD-10-CM

## 2023-03-08 DIAGNOSIS — Z00.00 ANNUAL PHYSICAL EXAM: ICD-10-CM

## 2023-03-08 DIAGNOSIS — M18.0 ARTHRITIS OF CARPOMETACARPAL (CMC) JOINT OF BOTH THUMBS: Chronic | ICD-10-CM

## 2023-03-08 DIAGNOSIS — J44.9 COPD, MODERATE: ICD-10-CM

## 2023-03-08 DIAGNOSIS — E78.2 MIXED HYPERLIPIDEMIA: ICD-10-CM

## 2023-03-08 DIAGNOSIS — M85.89 OSTEOPENIA OF MULTIPLE SITES: Chronic | ICD-10-CM

## 2023-03-08 DIAGNOSIS — I10 BENIGN ESSENTIAL HYPERTENSION: ICD-10-CM

## 2023-03-08 DIAGNOSIS — E03.9 ACQUIRED HYPOTHYROIDISM: ICD-10-CM

## 2023-03-08 DIAGNOSIS — I44.7 LEFT BUNDLE BRANCH BLOCK (LBBB) DETERMINED BY ELECTROCARDIOGRAPHY: ICD-10-CM

## 2023-03-08 DIAGNOSIS — E66.3 OVERWEIGHT WITH BODY MASS INDEX (BMI) OF 28 TO 28.9 IN ADULT: Chronic | ICD-10-CM

## 2023-03-08 DIAGNOSIS — Z72.0 TOBACCO USE: ICD-10-CM

## 2023-03-08 DIAGNOSIS — Z00.00 MEDICARE ANNUAL WELLNESS VISIT, SUBSEQUENT: Primary | ICD-10-CM

## 2023-03-08 DIAGNOSIS — Z12.31 BREAST CANCER SCREENING BY MAMMOGRAM: ICD-10-CM

## 2023-03-08 PROCEDURE — 93000 ELECTROCARDIOGRAM COMPLETE: CPT | Performed by: INTERNAL MEDICINE

## 2023-03-08 PROCEDURE — 99214 OFFICE O/P EST MOD 30 MIN: CPT | Performed by: INTERNAL MEDICINE

## 2023-03-08 PROCEDURE — 99397 PER PM REEVAL EST PAT 65+ YR: CPT | Performed by: INTERNAL MEDICINE

## 2023-03-08 PROCEDURE — G0439 PPPS, SUBSEQ VISIT: HCPCS | Performed by: INTERNAL MEDICINE

## 2023-03-08 PROCEDURE — 1170F FXNL STATUS ASSESSED: CPT | Performed by: INTERNAL MEDICINE

## 2023-03-08 PROCEDURE — 3078F DIAST BP <80 MM HG: CPT | Performed by: INTERNAL MEDICINE

## 2023-03-08 PROCEDURE — 1126F AMNT PAIN NOTED NONE PRSNT: CPT | Performed by: INTERNAL MEDICINE

## 2023-03-08 PROCEDURE — 3077F SYST BP >= 140 MM HG: CPT | Performed by: INTERNAL MEDICINE

## 2023-03-08 PROCEDURE — 1159F MED LIST DOCD IN RCRD: CPT | Performed by: INTERNAL MEDICINE

## 2023-03-08 PROCEDURE — 96160 PT-FOCUSED HLTH RISK ASSMT: CPT | Performed by: INTERNAL MEDICINE

## 2023-03-08 RX ORDER — LOSARTAN POTASSIUM 50 MG/1
50 TABLET ORAL DAILY
Qty: 90 TABLET | Refills: 1 | Status: SHIPPED | OUTPATIENT
Start: 2023-03-08

## 2023-03-08 RX ORDER — LEVOTHYROXINE SODIUM 0.1 MG/1
100 TABLET ORAL DAILY
Qty: 90 TABLET | Refills: 1 | Status: SHIPPED | OUTPATIENT
Start: 2023-03-08

## 2023-03-08 NOTE — ASSESSMENT & PLAN NOTE
She is encouraged to drink lots of fluids every day and use MiraLAX and Metamucil every day. The MiraLAX dissolves very well in hot coffee or tea. It may also be put in water.

## 2023-03-08 NOTE — ASSESSMENT & PLAN NOTE
I ordered mammogram and DEXA. She is encouraged to get the shingles vaccine at her pharmacy. She is up to date on colonoscopy. She declines lung cancer screening CT.

## 2023-03-08 NOTE — ASSESSMENT & PLAN NOTE
I have encouraged her to decrease the cigarettes she allows herself per day to 10 per day for a couple of weeks, then decrease to 9 per day for a couple of weeks, then to 8 per day, etc.

## 2023-03-08 NOTE — ASSESSMENT & PLAN NOTE
She will start the phentermine 1 tablet every morning. She will watch for any increase in heart rate or palpitations or increase in blood pressure. She is encouraged to do more walking and exercise. Weigh at home once a week to monitor progress. She will come back to see me for fasting follow-up in 6 months.

## 2023-03-08 NOTE — PROGRESS NOTES
The ABCs of the Annual Wellness Visit  Initial Medicare Wellness Visit    Chief Complaint   Patient presents with   • Medicare Wellness-subsequent   • Hypertension       Subjective   History of Present Illness:  Tenisha Grayson is a 72 y.o. female who presents for an Initial Medicare Wellness Visit.    The patient presents today for an annual wellness visit.    Hyperlipidemia  The patient is taking rosuvastatin for her cholesterol. She denies any side effects. The patient states she can hear her heart beating after she takes the rosuvastatin at night. She takes it approximately 1 hour before bed. She notes slight tachycardia. She notes she can notice her pulse.     Overweight  The patient notes she was out of the Phentermine for 6 months. She recently refilled the prescription. The patient has gained 10 pounds. She is 7 pounds higher than she was last year. The Phentermine does decrease her appetite. The patient has not been exercising much. She likes to walk. The patient is not apart of a gym. She was attending the senior center for some time. The patient is interested in semaglutide. She notes having arthritis in her wrist and thumbs.     Hypertension  The patient does not check her blood pressure at home. Her blood pressure is elevated today. She is taking losartan 50 mg. The patient states she does not consume excessive amounts of sodium. She denies lower extremity edema.     Back pain  The patient was prescribed gabapentin for her back pain. She has not used it since she believed she had shingles. The patient is not taking nortriptyline.     Hormone replacement  The patient is no longer using the compounded hormone cream. She is now receiving the injectable pellets. She is using the progesterone capsule in the evening.    Vitamin D deficiency  The patient is taking vitamin D.     Overall health  She denies heartburn or indigestion. She had an episode of possible shingles in 2022. She notes a history of  prediabetes. Her EKG was normal.     Osteopenia  The patient's last DEXA scan was in 2018. She has osteopenia. Her lowest T-score was in the femoral neck at -2.2.    Seborrheic keratosis  The patient saw the dermatologist. She was told they were harmless.     Health maintenance  She had a mammogram in 2018. She had a colonoscopy in 2020. She performs self breast exams.     CHRONIC CONDITIONS:    HEALTH RISK ASSESSMENT    Recent Hospitalizations:  She was not admitted to the hospital during the last year.       Current Medical Providers:  Patient Care Team:  Doris Canseco MD as PCP - General (Internal Medicine)  Deisy Sumner MD as Consulting Physician (Dermatology)    Smoking Status:  Social History     Tobacco Use   Smoking Status Some Days   • Packs/day: 1.00   • Years: 44.00   • Pack years: 44.00   • Types: Cigarettes   Smokeless Tobacco Never   Tobacco Comments    Stopped 8/7/17       Alcohol Consumption:  Social History     Substance and Sexual Activity   Alcohol Use Yes       Depression Screen:   PHQ-2/PHQ-9 Depression Screening 3/8/2023   Retired PHQ-9 Total Score -   Retired Total Score -   Little Interest or Pleasure in Doing Things 0-->not at all   Feeling Down, Depressed or Hopeless 0-->not at all   PHQ-9: Brief Depression Severity Measure Score 0       Fall Risk Screen:  DAMON Fall Risk Assessment was completed, and patient is at LOW risk for falls.Assessment completed on:3/8/2023    Health Habits and Functional and Cognitive Screening:  Functional & Cognitive Status 11/3/2021   Do you have difficulty preparing food and eating? No   Do you have difficulty bathing yourself, getting dressed or grooming yourself? No   Do you have difficulty using the toilet? No   Do you have difficulty moving around from place to place? No   Do you have trouble with steps or getting out of a bed or a chair? No   Current Diet Well Balanced Diet   Dental Exam Up to date   Eye Exam Up to date   Exercise (times per  week) 2 times per week   Current Exercises Include Walking   Current Exercise Activities Include -   Do you need help using the phone?  No   Are you deaf or do you have serious difficulty hearing?  No   Do you need help with transportation? No   Do you need help shopping? No   Do you need help preparing meals?  No   Do you need help with housework?  No   Do you need help with laundry? No   Do you need help taking your medications? No   Do you need help managing money? No   Do you ever drive or ride in a car without wearing a seat belt? No   Have you felt unusual stress, anger or loneliness in the last month? No   Who do you live with? Spouse   If you need help, do you have trouble finding someone available to you? No   Have you been bothered in the last four weeks by sexual problems? No   Do you have difficulty concentrating, remembering or making decisions? No         Age-appropriate Screening Schedule:  Refer to the list below for future screening recommendations based on patient's age, sex and/or medical conditions. Orders for these recommended tests are listed in the plan section. The patient has been provided with a written plan.    Health Maintenance   Topic Date Due   • LUNG CANCER SCREENING  Never done   • ZOSTER VACCINE (2 of 3) 07/22/2016   • MAMMOGRAM  08/07/2020   • DXA SCAN  08/07/2020   • LIPID PANEL  08/04/2023   • ANNUAL WELLNESS VISIT  03/08/2024   • TDAP/TD VACCINES (3 - Td or Tdap) 04/19/2028   • COLORECTAL CANCER SCREENING  09/04/2030   • HEPATITIS C SCREENING  Completed   • COVID-19 Vaccine  Completed   • INFLUENZA VACCINE  Completed   • Pneumococcal Vaccine 65+  Completed   • PAP SMEAR  Discontinued        The following portions of the patient's history were reviewed and updated as appropriate: allergies, current medications, past family history, past medical history, past social history, past surgical history and problem list.    Does the patient have evidence of cognitive impairment?  No    Aspirin is on active medication list. Aspirin use is indicated based on review of current medical condition/s. Pros and cons of this therapy have been discussed today. Benefits of this medication outweigh potential harm.  Patient has been encouraged to continue taking this medication.  .      Outpatient Medications Prior to Visit   Medication Sig Dispense Refill   • aspirin 325 MG tablet Take 1 tablet by mouth Every Other Day.     • Calcium-Magnesium-Zinc 333-133-5 MG tablet Take 1 tablet by mouth Every 30 (Thirty) Days.     • Coenzyme Q10 400 MG capsule Take 1 capsule by mouth Every Night. 90 capsule 3   • Hormone Cream Base cream Apply 2 clicks (0.5mL) to inner arm or thigh each day  Estradiol/Estriol/Testosterone Versabase CR 0.2/0.8/1mg/0.5mL 15 g 5   • liothyronine (CYTOMEL) 5 MCG tablet TAKE 1 TABLET BY MOUTH DAILY. 90 tablet 1   • MULTIPLE VITAMIN PO Take 1 tablet by mouth Daily.     • phentermine (ADIPEX-P) 37.5 MG tablet TAKE 1 TABLET BY MOUTH DAILY 30 tablet 0   • progesterone (PROMETRIUM) 200 MG capsule Take 2 capsules by mouth 1-2 hours before bedtime 180 capsule 1   • rosuvastatin (CRESTOR) 5 MG tablet TAKE 1 TABLET BY MOUTH EVERY NIGHT. 90 tablet 1   • vitamin D3 125 MCG (5000 UT) capsule capsule Take 1 capsule by mouth Daily. When she remembers 30 capsule 11   • levothyroxine (SYNTHROID, LEVOTHROID) 100 MCG tablet Take 1 tablet by mouth Daily. 90 tablet 1   • losartan (COZAAR) 50 MG tablet TAKE ONE TABLET BY MOUTH EVERY DAY 90 tablet 1   • bisacodyl 5 MG EC tablet TAKE 3 TABLETS (15 MG) BY MOUTH DAILY AS NEEDED     • gabapentin (NEURONTIN) 100 MG capsule Take 1 capsule by mouth Every Night. 30 capsule 2   • NON FORMULARY Testosterone/Estradiol 10mg-1mg/ml CAPRYLIC cream  Apply 2 clicks twice daily to upper inner thighs     • nortriptyline (PAMELOR) 10 MG capsule Take 1 capsule by mouth Every Night. 30 capsule 2     No facility-administered medications prior to visit.       Patient Active Problem  "List   Diagnosis   • COPD, moderate (HCC)   • Adnexal mass   • Tobacco use   • Vitamin D deficiency   • Menopausal and postmenopausal disorder   • Acquired hypothyroidism   • Benign essential hypertension   • Other fatigue   • Left bundle branch block (LBBB) determined by electrocardiography   • Stress and adjustment reaction   • Screening for colon cancer   • Mixed hyperlipidemia   • Osteopenia of multiple sites   • Overweight with body mass index (BMI) of 28 to 28.9 in adult   • Diverticulosis   • Chronic constipation   • Medicare annual wellness visit, subsequent   • Annual physical exam   • Screening mammogram, encounter for   • Arthritis of carpometacarpal (CMC) joint of both thumbs   • Rash   • Herpes zoster without complication       Advanced Care Planning:  Advance Directive is not on file.  ACP discussion was held with the patient during this visit. Patient does not have an advance directive, information provided.    Review of Systems    Compared to one year ago, the patient feels her physical health is the same.  Compared to one year ago, the patient feels her mental health is the same.    Reviewed chart for potential of high risk medication in the elderly: yes  Reviewed chart for potential of harmful drug interactions in the elderly:yes    Objective         Vitals:    03/08/23 1355   BP: 148/64   BP Location: Left arm   Patient Position: Sitting   Cuff Size: Adult   Pulse: 74   Temp: 98 °F (36.7 °C)   TempSrc: Infrared   SpO2: 98%   Weight: 67 kg (147 lb 12.8 oz)   Height: 154 cm (60.63\")     BMI is >= 25 and <30. (Overweight) The following options were offered after discussion;: weight loss educational material (shared in after visit summary), exercise counseling/recommendations and nutrition counseling/recommendations    Body mass index is 28.27 kg/m².  Discussed the patient's BMI with her. The BMI is above average; BMI management plan is completed.    Physical Exam  Vitals and nursing note reviewed. "   Constitutional:       Appearance: She is well-developed and overweight.      Comments: Excess belly fat.    HENT:      Head: Normocephalic.   Eyes:      Conjunctiva/sclera: Conjunctivae normal.      Pupils: Pupils are equal, round, and reactive to light.   Neck:      Thyroid: No thyromegaly.   Cardiovascular:      Rate and Rhythm: Normal rate and regular rhythm.      Heart sounds: Normal heart sounds.   Pulmonary:      Effort: Pulmonary effort is normal.      Breath sounds: Normal breath sounds. No wheezing.   Chest:   Breasts:     Right: No inverted nipple, mass, nipple discharge, skin change or tenderness.      Left: No inverted nipple, mass, nipple discharge, skin change or tenderness.   Abdominal:      General: Bowel sounds are normal.      Palpations: Abdomen is soft.      Tenderness: There is no abdominal tenderness.   Musculoskeletal:         General: No tenderness. Normal range of motion.      Cervical back: Normal range of motion and neck supple.   Lymphadenopathy:      Cervical: No cervical adenopathy.   Skin:     General: Skin is warm and dry.      Findings: No rash.   Neurological:      Mental Status: She is alert and oriented to person, place, and time.      Cranial Nerves: No cranial nerve deficit.      Sensory: No sensory deficit.      Coordination: Coordination normal.      Gait: Gait normal.   Psychiatric:         Speech: Speech normal.         Behavior: Behavior normal.         Thought Content: Thought content normal.         Judgment: Judgment normal.           ECG 12 Lead    Date/Time: 3/8/2023 2:37 PM  Performed by: Doris Canseco MD  Authorized by: Doris Canseco MD   Comparison: compared with previous ECG   Similar to previous ECG  Rhythm: sinus rhythm  Rate: normal  BPM: 95  Conduction: left bundle branch block  ST Segments: ST segments normal  T Waves: T waves normal  QRS axis: left    Clinical impression: abnormal EKG                  Assessment & Plan   Medicare Risks and  Personalized Health Plan  CMS Preventative Services Quick Reference  Cardiovascular risk  Obesity/Overweight   Osteoporosis Risk    The above risks/problems have been discussed with the patient.  Pertinent information has been shared with the patient in the After Visit Summary.  Follow up plans and orders are seen below in the Assessment/Plan Section.  Patient Instructions   Problem List Items Addressed This Visit        Cardiac and Vasculature    Left bundle branch block (LBBB) determined by electrocardiography    Overview     11/3/2021 Doris Canseco MD    EKG is unchanged.         Benign essential hypertension    Overview     Taking losartan 50 mg tablet daily.               Current Assessment & Plan     She will continue rosuvastatin nightly. Continue to decrease sugars and fats in the diet. Increase exercise and walking.         Relevant Medications    losartan (COZAAR) 50 MG tablet    Other Relevant Orders    CBC & Differential    Comprehensive Metabolic Panel    Microalbumin / Creatinine Urine Ratio - Urine, Clean Catch    Urinalysis With Microscopic - Urine, Clean Catch    ECG 12 Lead    Mixed hyperlipidemia    Overview     Taking rosuvastatin every evening.         Current Assessment & Plan     Continue losartan daily. Continue to avoid salt in the diet.         Relevant Medications    rosuvastatin (CRESTOR) 5 MG tablet    Other Relevant Orders    Lipid Panel       Endocrine and Metabolic    Vitamin D deficiency    Current Assessment & Plan     Continue current dose of vitamin D3 and calcium daily.         Relevant Orders    Vitamin D,25-Hydroxy    Acquired hypothyroidism    Overview     Taking levothyroxine 100 mcg daily and liothyronine 5 mcg tablet daily.             Current Assessment & Plan     Continue levothyroxine and liothyronine daily.         Relevant Medications    liothyronine (CYTOMEL) 5 MCG tablet    levothyroxine (SYNTHROID, LEVOTHROID) 100 MCG tablet    Other Relevant Orders    TSH     T4, Free    T3, Free       Gastrointestinal Abdominal     Chronic constipation (Chronic)    Current Assessment & Plan     She is encouraged to drink lots of fluids every day and use MiraLAX and Metamucil every day. The MiraLAX dissolves very well in hot coffee or tea. It may also be put in water.            Genitourinary and Reproductive     Menopausal and postmenopausal disorder    Overview     1/27/2020 Doris Canseco MD    Will send her hormone levels to Ms. Humphreys at [facility] to adjust her hormone dose.         Relevant Orders    DEXA Bone Density Axial       Health Encounters    Medicare annual wellness visit, subsequent - Primary    Current Assessment & Plan     I ordered mammogram and DEXA. She is encouraged to get the shingles vaccine at her pharmacy. She is up to date on colonoscopy. She declines lung cancer screening CT.         Annual physical exam    Current Assessment & Plan     I ordered mammogram and DEXA. She is encouraged to get the shingles vaccine at her pharmacy. She is up to date on colonoscopy. She declines lung cancer screening CT.            Musculoskeletal and Injuries    Osteopenia of multiple sites (Chronic)    Overview     8/17/2018 DEXA showed normal spine T-scores, left femoral neck T score was -1.9, right femoral neck T score was -2.2.               Current Assessment & Plan     We will recheck DEXA bone density test. She is encouraged to do more weightbearing exercises with walking and exercise classes and using small hand weights at home. Continue taking calcium and vitamin D.         Arthritis of carpometacarpal (CMC) joint of both thumbs (Chronic)    Current Assessment & Plan     She may take Tylenol as needed. May use over the counter arthritis creams as needed.            Pulmonary and Pneumonias    COPD, moderate (HCC)    Overview     Patient is asymptomatic at present.  She has been advised to totally abstain from smoking.            Tobacco    Tobacco use     Overview     She has been advised to totally abstain from smoking to decrease heart disease risk and lung cancer risk.         Current Assessment & Plan     I have encouraged her to decrease the cigarettes she allows herself per day to 10 per day for a couple of weeks, then decrease to 9 per day for a couple of weeks, then to 8 per day, etc.            Other    Overweight with body mass index (BMI) of 28 to 28.9 in adult (Chronic)    Current Assessment & Plan     She will start the phentermine 1 tablet every morning. She will watch for any increase in heart rate or palpitations or increase in blood pressure. She is encouraged to do more walking and exercise. Weigh at home once a week to monitor progress. She will come back to see me for fasting follow-up in 6 months.         Relevant Medications    phentermine (ADIPEX-P) 37.5 MG tablet   Other Visit Diagnoses     Breast cancer screening by mammogram        Relevant Orders    Mammo Screening Digital Tomosynthesis Bilateral With CAD           Follow Up:  Next Medicare Wellness visit to be scheduled in 1 year.    Return in about 6 months (around 9/8/2023) for recheck fasting.    An After Visit Summary and PPPS were given to the patient.       Additional E&M Note during same encounter follows:  Patient has multiple medical problems which are significant and separately identifiable that require additional work above and beyond the Medicare Wellness Visit.      Chief Complaint  Medicare Wellness-subsequent and Hypertension    Subjective        Tenisha Grayson is also being seen today for constipation and smoking    Tobacco use  The patient states the amount she smokes fluctuates by day. She has tried Chantix in the past.    Constipation  The patient is no longer taking bisacodyl tablet. She has occasional constipation. The patient just returned from a 2-week trip. When she is out of her routine, her constipation worsens. Magnesium and green smoothies improve her  "constipation symptoms. She notes the Metamucil was not effective. She notes MiraLAX is effective.      Objective   Vital Signs:  /64 (BP Location: Left arm, Patient Position: Sitting, Cuff Size: Adult)   Pulse 74   Temp 98 °F (36.7 °C) (Infrared)   Ht 154 cm (60.63\")   Wt 67 kg (147 lb 12.8 oz)   SpO2 98%   BMI 28.27 kg/m²     The following data was reviewed by: Doris Canseco MD on 03/08/2023:  CMP    CMP 8/4/22   Glucose 101 (A)   BUN 16   Creatinine 0.80   Sodium 139   Potassium 5.0   Chloride 102   Calcium 9.2   Total Protein 6.7   Albumin 4.40   Globulin 2.3   Total Bilirubin 0.2   Alkaline Phosphatase 57   AST (SGOT) 18   ALT (SGPT) 22   BUN/Creatinine Ratio 20.0   (A) Abnormal value            CBC    CBC 8/4/22   WBC 9.58   RBC 4.39   Hemoglobin 14.8   Hematocrit 43.2   MCV 98.4 (A)   MCH 33.7 (A)   MCHC 34.3   RDW 11.0 (A)   Platelets 367   (A) Abnormal value            Lipid Panel    Lipid Panel 8/4/22   Total Cholesterol 237 (A)   Triglycerides 150   HDL Cholesterol 72 (A)   VLDL Cholesterol 26   LDL Cholesterol  139 (A)   (A) Abnormal value       Comments are available for some flowsheets but are not being displayed.           TSH    TSH 8/4/22   TSH 2.320           Assessment and Plan   Diagnoses and all orders for this visit:    1. Medicare annual wellness visit, subsequent (Primary)  Assessment & Plan:  I ordered mammogram and DEXA. She is encouraged to get the shingles vaccine at her pharmacy. She is up to date on colonoscopy. She declines lung cancer screening CT.      2. Annual physical exam  Assessment & Plan:  I ordered mammogram and DEXA. She is encouraged to get the shingles vaccine at her pharmacy. She is up to date on colonoscopy. She declines lung cancer screening CT.      3. Benign essential hypertension  Assessment & Plan:  She will continue rosuvastatin nightly. Continue to decrease sugars and fats in the diet. Increase exercise and walking.    Orders:  -     CBC & " Differential; Future  -     Comprehensive Metabolic Panel; Future  -     Microalbumin / Creatinine Urine Ratio - Urine, Clean Catch; Future  -     Urinalysis With Microscopic - Urine, Clean Catch; Future  -     ECG 12 Lead    4. Mixed hyperlipidemia  Assessment & Plan:  Continue losartan daily. Continue to avoid salt in the diet.    Orders:  -     Lipid Panel; Future    5. Acquired hypothyroidism  Assessment & Plan:  Continue levothyroxine and liothyronine daily.    Orders:  -     TSH; Future  -     T4, Free; Future  -     T3, Free; Future    6. Osteopenia of multiple sites  Assessment & Plan:  We will recheck DEXA bone density test. She is encouraged to do more weightbearing exercises with walking and exercise classes and using small hand weights at home. Continue taking calcium and vitamin D.      7. Overweight with body mass index (BMI) of 28 to 28.9 in adult  Assessment & Plan:  She will start the phentermine 1 tablet every morning. She will watch for any increase in heart rate or palpitations or increase in blood pressure. She is encouraged to do more walking and exercise. Weigh at home once a week to monitor progress. She will come back to see me for fasting follow-up in 6 months.      8. COPD, moderate (HCC)    9. Tobacco use  Assessment & Plan:  I have encouraged her to decrease the cigarettes she allows herself per day to 10 per day for a couple of weeks, then decrease to 9 per day for a couple of weeks, then to 8 per day, etc.      10. Vitamin D deficiency  Assessment & Plan:  Continue current dose of vitamin D3 and calcium daily.    Orders:  -     Vitamin D,25-Hydroxy; Future    11. Left bundle branch block (LBBB) determined by electrocardiography    12. Arthritis of carpometacarpal (CMC) joint of both thumbs  Assessment & Plan:  She may take Tylenol as needed. May use over the counter arthritis creams as needed.      13. Menopausal and postmenopausal disorder  -     DEXA Bone Density Axial; Future    14.  Breast cancer screening by mammogram  -     Mammo Screening Digital Tomosynthesis Bilateral With CAD; Future    15. Chronic constipation  Assessment & Plan:  She is encouraged to drink lots of fluids every day and use MiraLAX and Metamucil every day. The MiraLAX dissolves very well in hot coffee or tea. It may also be put in water.      Other orders  -     losartan (COZAAR) 50 MG tablet; Take 1 tablet by mouth Daily.  Dispense: 90 tablet; Refill: 1  -     levothyroxine (SYNTHROID, LEVOTHROID) 100 MCG tablet; Take 1 tablet by mouth Daily.  Dispense: 90 tablet; Refill: 1           Follow Up   Return in about 6 months (around 9/8/2023) for recheck fasting.  Patient was given instructions and counseling regarding her condition or for health maintenance advice. Please see specific information pulled into the AVS if appropriate.     Transcribed from ambient dictation for Doris Canseco MD by Alejandrina Olguin.  03/08/23   15:51 EST    Patient or patient representative verbalized consent to the visit recording.  I have personally performed the services described in this document as transcribed by the above individual, and it is both accurate and complete.  Doris Canseco MD  3/13/2023  11:57 EDT

## 2023-03-08 NOTE — ASSESSMENT & PLAN NOTE
She will continue rosuvastatin nightly. Continue to decrease sugars and fats in the diet. Increase exercise and walking.

## 2023-03-08 NOTE — ASSESSMENT & PLAN NOTE
We will recheck DEXA bone density test. She is encouraged to do more weightbearing exercises with walking and exercise classes and using small hand weights at home. Continue taking calcium and vitamin D.

## 2023-03-13 NOTE — PATIENT INSTRUCTIONS
Patient Instructions   Problem List Items Addressed This Visit          Cardiac and Vasculature    Left bundle branch block (LBBB) determined by electrocardiography    Overview     11/3/2021 Doris Canseco MD    EKG is unchanged.         Benign essential hypertension    Overview     Taking losartan 50 mg tablet daily.               Current Assessment & Plan     She will continue rosuvastatin nightly. Continue to decrease sugars and fats in the diet. Increase exercise and walking.         Relevant Medications    losartan (COZAAR) 50 MG tablet    Other Relevant Orders    CBC & Differential    Comprehensive Metabolic Panel    Microalbumin / Creatinine Urine Ratio - Urine, Clean Catch    Urinalysis With Microscopic - Urine, Clean Catch    ECG 12 Lead    Mixed hyperlipidemia    Overview     Taking rosuvastatin every evening.         Current Assessment & Plan     Continue losartan daily. Continue to avoid salt in the diet.         Relevant Medications    rosuvastatin (CRESTOR) 5 MG tablet    Other Relevant Orders    Lipid Panel       Endocrine and Metabolic    Vitamin D deficiency    Current Assessment & Plan     Continue current dose of vitamin D3 and calcium daily.         Relevant Orders    Vitamin D,25-Hydroxy    Acquired hypothyroidism    Overview     Taking levothyroxine 100 mcg daily and liothyronine 5 mcg tablet daily.             Current Assessment & Plan     Continue levothyroxine and liothyronine daily.         Relevant Medications    liothyronine (CYTOMEL) 5 MCG tablet    levothyroxine (SYNTHROID, LEVOTHROID) 100 MCG tablet    Other Relevant Orders    TSH    T4, Free    T3, Free       Gastrointestinal Abdominal     Chronic constipation (Chronic)    Current Assessment & Plan     She is encouraged to drink lots of fluids every day and use MiraLAX and Metamucil every day. The MiraLAX dissolves very well in hot coffee or tea. It may also be put in water.            Genitourinary and Reproductive     Menopausal  and postmenopausal disorder    Overview     1/27/2020 Doris Canseco MD    Will send her hormone levels to Ms. Humphreys at [facility] to adjust her hormone dose.         Relevant Orders    DEXA Bone Density Axial       Health Encounters    Medicare annual wellness visit, subsequent - Primary    Current Assessment & Plan     I ordered mammogram and DEXA. She is encouraged to get the shingles vaccine at her pharmacy. She is up to date on colonoscopy. She declines lung cancer screening CT.         Annual physical exam    Current Assessment & Plan     I ordered mammogram and DEXA. She is encouraged to get the shingles vaccine at her pharmacy. She is up to date on colonoscopy. She declines lung cancer screening CT.            Musculoskeletal and Injuries    Osteopenia of multiple sites (Chronic)    Overview     8/17/2018 DEXA showed normal spine T-scores, left femoral neck T score was -1.9, right femoral neck T score was -2.2.               Current Assessment & Plan     We will recheck DEXA bone density test. She is encouraged to do more weightbearing exercises with walking and exercise classes and using small hand weights at home. Continue taking calcium and vitamin D.         Arthritis of carpometacarpal (CMC) joint of both thumbs (Chronic)    Current Assessment & Plan     She may take Tylenol as needed. May use over the counter arthritis creams as needed.            Pulmonary and Pneumonias    COPD, moderate (HCC)    Overview     Patient is asymptomatic at present.  She has been advised to totally abstain from smoking.            Tobacco    Tobacco use    Overview     She has been advised to totally abstain from smoking to decrease heart disease risk and lung cancer risk.         Current Assessment & Plan     I have encouraged her to decrease the cigarettes she allows herself per day to 10 per day for a couple of weeks, then decrease to 9 per day for a couple of weeks, then to 8 per day, etc.             Other    Overweight with body mass index (BMI) of 28 to 28.9 in adult (Chronic)    Current Assessment & Plan     She will start the phentermine 1 tablet every morning. She will watch for any increase in heart rate or palpitations or increase in blood pressure. She is encouraged to do more walking and exercise. Weigh at home once a week to monitor progress. She will come back to see me for fasting follow-up in 6 months.         Relevant Medications    phentermine (ADIPEX-P) 37.5 MG tablet     Other Visit Diagnoses       Breast cancer screening by mammogram        Relevant Orders    Mammo Screening Digital Tomosynthesis Bilateral With CAD           BMI for Adults  What is BMI?  Body mass index (BMI) is a number that is calculated from a person's weight and height. BMI can help estimate how much of a person's weight is composed of fat. BMI does not measure body fat directly. Rather, it is an alternative to procedures that directly measure body fat, which can be difficult and expensive.  BMI can help identify people who may be at higher risk for certain medical problems.  What are BMI measurements used for?  BMI is used as a screening tool to identify possible weight problems. It helps determine whether a person is obese, overweight, a healthy weight, or underweight.  BMI is useful for:  Identifying a weight problem that may be related to a medical condition or may increase the risk for medical problems.  Promoting changes, such as changes in diet and exercise, to help reach a healthy weight. BMI screening can be repeated to see if these changes are working.  How is BMI calculated?  BMI involves measuring your weight in relation to your height. Both height and weight are measured, and the BMI is calculated from those numbers. This can be done either in English (U.S[place] or metric measurements. Note that charts and online BMI calculators are available to help you find your BMI quickly and easily without having to do  "these calculations yourself.  To calculate your BMI in English (U.S.) measurements:    Measure your weight in pounds (lb).  Multiply the number of pounds by 703.  For example, for a person who weighs 180 lb, multiply that number by 703, which equals 126,540.  Measure your height in inches. Then multiply that number by itself to get a measurement called \"inches squared.\"  For example, for a person who is 70 inches tall, the \"inches squared\" measurement is 70 inches x 70 inches, which equals 4,900 inches squared.  Divide the total from step 2 (number of lb x 703) by the total from step 3 (inches squared): 126,540 ÷ 4,900 = 25.8. This is your BMI.  To calculate your BMI in metric measurements:  Measure your weight in kilograms (kg).  Measure your height in meters (m). Then multiply that number by itself to get a measurement called \"meters squared.\"  For example, for a person who is 1.75 m tall, the \"meters squared\" measurement is 1.75 m x 1.75 m, which is equal to 3.1 meters squared.  Divide the number of kilograms (your weight) by the meters squared number. In this example: 70 ÷ 3.1 = 22.6. This is your BMI.  What do the results mean?  BMI charts are used to identify whether you are underweight, normal weight, overweight, or obese. The following guidelines will be used:  Underweight: BMI less than 18.5.  Normal weight: BMI between 18.5 and 24.9.  Overweight: BMI between 25 and 29.9.  Obese: BMI of 30 or above.  Keep these notes in mind:  Weight includes both fat and muscle, so someone with a muscular build, such as an athlete, may have a BMI that is higher than 24.9. In cases like these, BMI is not an accurate measure of body fat.  To determine if excess body fat is the cause of a BMI of 25 or higher, further assessments may need to be done by a health care provider.  BMI is usually interpreted in the same way for men and women.  Where to find more information  For more information about BMI, including tools to " quickly calculate your BMI, go to these websites:  Centers for Disease Control and Prevention: www.cdc.gov  American Heart Association: www.heart.org  National Heart, Lung, and Blood [place]: www.nhlbi.nih.gov  Summary  Body mass index (BMI) is a number that is calculated from a person's weight and height.  BMI may help estimate how much of a person's weight is composed of fat. BMI can help identify those who may be at higher risk for certain medical problems.  BMI can be measured using English measurements or metric measurements.  BMI charts are used to identify whether you are underweight, normal weight, overweight, or obese.  This information is not intended to replace advice given to you by your health care provider. Make sure you discuss any questions you have with your health care provider.  Document Revised: 09/09/2020 Document Reviewed: 07/17/2020  Elsevier Patient Education © 2022 Elsevier Inc.

## 2023-05-15 RX ORDER — LOSARTAN POTASSIUM 50 MG/1
TABLET ORAL
Qty: 90 TABLET | Refills: 1 | Status: SHIPPED | OUTPATIENT
Start: 2023-05-15

## 2023-05-26 ENCOUNTER — LAB (OUTPATIENT)
Dept: LAB | Facility: HOSPITAL | Age: 73
End: 2023-05-26

## 2023-05-26 DIAGNOSIS — E55.9 VITAMIN D DEFICIENCY: ICD-10-CM

## 2023-05-26 DIAGNOSIS — E78.2 MIXED HYPERLIPIDEMIA: ICD-10-CM

## 2023-05-26 DIAGNOSIS — I10 BENIGN ESSENTIAL HYPERTENSION: ICD-10-CM

## 2023-05-26 DIAGNOSIS — E03.9 ACQUIRED HYPOTHYROIDISM: ICD-10-CM

## 2023-05-27 LAB
25(OH)D3+25(OH)D2 SERPL-MCNC: 30 NG/ML (ref 30–100)
ALBUMIN SERPL-MCNC: 4.3 G/DL (ref 3.5–5.2)
ALBUMIN/CREAT UR: 376 MG/G CREAT (ref 0–29)
ALBUMIN/GLOB SERPL: 1.6 G/DL
ALP SERPL-CCNC: 59 U/L (ref 39–117)
ALT SERPL-CCNC: 21 U/L (ref 1–33)
APPEARANCE UR: ABNORMAL
AST SERPL-CCNC: 17 U/L (ref 1–32)
BACTERIA #/AREA URNS HPF: ABNORMAL /HPF
BASOPHILS # BLD AUTO: 0.07 10*3/MM3 (ref 0–0.2)
BASOPHILS NFR BLD AUTO: 1.1 % (ref 0–1.5)
BILIRUB SERPL-MCNC: 0.4 MG/DL (ref 0–1.2)
BILIRUB UR QL STRIP: NEGATIVE
BUN SERPL-MCNC: 14 MG/DL (ref 8–23)
BUN/CREAT SERPL: 21.9 (ref 7–25)
CALCIUM SERPL-MCNC: 10 MG/DL (ref 8.6–10.5)
CASTS URNS MICRO: ABNORMAL
CHLORIDE SERPL-SCNC: 103 MMOL/L (ref 98–107)
CHOLEST SERPL-MCNC: 166 MG/DL (ref 0–200)
CO2 SERPL-SCNC: 24.1 MMOL/L (ref 22–29)
COLOR UR: YELLOW
CREAT SERPL-MCNC: 0.64 MG/DL (ref 0.57–1)
CREAT UR-MCNC: 27.4 MG/DL
EGFRCR SERPLBLD CKD-EPI 2021: 94.6 ML/MIN/1.73
EOSINOPHIL # BLD AUTO: 0.48 10*3/MM3 (ref 0–0.4)
EOSINOPHIL NFR BLD AUTO: 7.3 % (ref 0.3–6.2)
EPI CELLS #/AREA URNS HPF: ABNORMAL /HPF
ERYTHROCYTE [DISTWIDTH] IN BLOOD BY AUTOMATED COUNT: 11.6 % (ref 12.3–15.4)
GLOBULIN SER CALC-MCNC: 2.7 GM/DL
GLUCOSE SERPL-MCNC: 95 MG/DL (ref 65–99)
GLUCOSE UR QL STRIP: NEGATIVE
HCT VFR BLD AUTO: 45.1 % (ref 34–46.6)
HDLC SERPL-MCNC: 67 MG/DL (ref 40–60)
HGB BLD-MCNC: 15.4 G/DL (ref 12–15.9)
HGB UR QL STRIP: ABNORMAL
IMM GRANULOCYTES # BLD AUTO: 0.01 10*3/MM3 (ref 0–0.05)
IMM GRANULOCYTES NFR BLD AUTO: 0.2 % (ref 0–0.5)
KETONES UR QL STRIP: NEGATIVE
LDLC SERPL CALC-MCNC: 76 MG/DL (ref 0–100)
LEUKOCYTE ESTERASE UR QL STRIP: NEGATIVE
LYMPHOCYTES # BLD AUTO: 1.41 10*3/MM3 (ref 0.7–3.1)
LYMPHOCYTES NFR BLD AUTO: 21.6 % (ref 19.6–45.3)
MCH RBC QN AUTO: 32.8 PG (ref 26.6–33)
MCHC RBC AUTO-ENTMCNC: 34.1 G/DL (ref 31.5–35.7)
MCV RBC AUTO: 96.2 FL (ref 79–97)
MICROALBUMIN UR-MCNC: 102.9 UG/ML
MONOCYTES # BLD AUTO: 0.58 10*3/MM3 (ref 0.1–0.9)
MONOCYTES NFR BLD AUTO: 8.9 % (ref 5–12)
NEUTROPHILS # BLD AUTO: 3.99 10*3/MM3 (ref 1.7–7)
NEUTROPHILS NFR BLD AUTO: 60.9 % (ref 42.7–76)
NITRITE UR QL STRIP: NEGATIVE
NRBC BLD AUTO-RTO: 0 /100 WBC (ref 0–0.2)
PH UR STRIP: 7.5 [PH] (ref 5–8)
PLATELET # BLD AUTO: 347 10*3/MM3 (ref 140–450)
POTASSIUM SERPL-SCNC: 4.5 MMOL/L (ref 3.5–5.2)
PROT SERPL-MCNC: 7 G/DL (ref 6–8.5)
PROT UR QL STRIP: ABNORMAL
RBC # BLD AUTO: 4.69 10*6/MM3 (ref 3.77–5.28)
RBC #/AREA URNS HPF: ABNORMAL /HPF
SODIUM SERPL-SCNC: 138 MMOL/L (ref 136–145)
SP GR UR STRIP: 1.01 (ref 1–1.03)
T3FREE SERPL-MCNC: 4.1 PG/ML (ref 2–4.4)
T4 FREE SERPL-MCNC: 1.75 NG/DL (ref 0.93–1.7)
TRIGL SERPL-MCNC: 133 MG/DL (ref 0–150)
TSH SERPL DL<=0.005 MIU/L-ACNC: 0.97 UIU/ML (ref 0.27–4.2)
UROBILINOGEN UR STRIP-MCNC: ABNORMAL MG/DL
VLDLC SERPL CALC-MCNC: 23 MG/DL (ref 5–40)
WBC # BLD AUTO: 6.54 10*3/MM3 (ref 3.4–10.8)
WBC #/AREA URNS HPF: ABNORMAL /HPF

## 2023-05-30 ENCOUNTER — TELEPHONE (OUTPATIENT)
Dept: INTERNAL MEDICINE | Facility: CLINIC | Age: 73
End: 2023-05-30

## 2023-05-30 DIAGNOSIS — R80.1 PERSISTENT PROTEINURIA: Primary | ICD-10-CM

## 2023-05-30 NOTE — TELEPHONE ENCOUNTER
"Called and left a voicemail asking to callback to go over the lab results from Dr. Canseco:    \"Please call patient and mail her lab letter as well.     Protein loss in the urine has increased.  I am referring you to a nephrologist to investigate.  Otherwise your kidney function is very good.     Vitamin D level is low at 30.  Goal is 50 to prevent osteoporosis.  Continue taking 5000 units of vitamin D3 every day.     LDL (bad cholesterol) has improved from 139 down to 76!  Goal is less than 100.   HDL (good cholesterol) is very good at 67.  Goal is greater than 50.     Blood cell counts, thyroid levels, kidney and liver function, are all acceptable. \"  "

## 2023-05-31 NOTE — TELEPHONE ENCOUNTER
Patient returned call and I read her the note from Dr Canseco verbatim, she verbalized understanding    She wants to know if she has a UTI and if that is what could be showing as protein in the urine. She requests a call back to discuss protein in urine, what causes it, what that means for her, and why she was referred to nephrology for it

## 2023-07-24 RX ORDER — LIOTHYRONINE SODIUM 5 UG/1
5 TABLET ORAL DAILY
Qty: 90 TABLET | Refills: 1 | Status: SHIPPED | OUTPATIENT
Start: 2023-07-24

## 2023-08-08 ENCOUNTER — APPOINTMENT (OUTPATIENT)
Dept: MAMMOGRAPHY | Facility: HOSPITAL | Age: 73
End: 2023-08-08
Payer: MEDICARE

## 2023-08-08 ENCOUNTER — HOSPITAL ENCOUNTER (OUTPATIENT)
Dept: BONE DENSITY | Facility: HOSPITAL | Age: 73
Discharge: HOME | End: 2023-08-08
Admitting: INTERNAL MEDICINE
Payer: MEDICARE

## 2023-08-08 DIAGNOSIS — N95.9 MENOPAUSAL AND POSTMENOPAUSAL DISORDER: ICD-10-CM

## 2023-08-08 PROCEDURE — 77080 DXA BONE DENSITY AXIAL: CPT

## 2023-08-16 NOTE — PROGRESS NOTES
8/2023 DEXA shows osteopenia has improved a little.  The lowest T score is in the femoral neck at -1.7.    Continue calcium and vitamin D3.    Continue to increase weightbearing exercises with walking and exercise classes done on the feet and using small hand weights at home.    To keep your bones strong and healthy, would encourage weightbearing exercise 30 minutes 3 times a week at minimum.  Incorporate walking into daily activities, 30 minutes a day, 150 minutes a week, spread out over 5 days a week.     Do not smoke.  Maintain a normal body weight.    Eat a diet rich in calcium and vitamin D. Good sources of calcium are low-fat dairy products, dark green leafy vegetables, canned salmon or sardines, tofu, and calcium-fortified orange juice and cereals.    Avoid high protein diets. Protein is important, but too much animal protein can cause bone loss.    Limit caffeine, but moderate amounts of coffee and tea are fine. Avoid carbonated cola drinks as studies show drinking these puts you at greater risk for bone loss.    Recommend bone density study every 2 years.

## 2023-08-21 DIAGNOSIS — E66.3 OVERWEIGHT WITH BODY MASS INDEX (BMI) OF 28 TO 28.9 IN ADULT: Chronic | ICD-10-CM

## 2023-08-21 RX ORDER — PHENTERMINE HYDROCHLORIDE 37.5 MG/1
37.5 TABLET ORAL DAILY
Qty: 30 TABLET | Refills: 0 | Status: SHIPPED | OUTPATIENT
Start: 2023-08-21

## 2023-08-21 RX ORDER — LEVOTHYROXINE SODIUM 0.1 MG/1
100 TABLET ORAL DAILY
Qty: 90 TABLET | Refills: 1 | Status: SHIPPED | OUTPATIENT
Start: 2023-08-21

## 2023-08-21 NOTE — TELEPHONE ENCOUNTER
Rx Refill Note  Requested Prescriptions     Pending Prescriptions Disp Refills    phentermine (ADIPEX-P) 37.5 MG tablet [Pharmacy Med Name: PHENTERMINE 37.5 MG TAB 37.5 Tablet] 30 tablet 0     Sig: TAKE 1 TABLET BY MOUTH DAILY    levothyroxine (SYNTHROID, LEVOTHROID) 100 MCG tablet [Pharmacy Med Name: LEVOTHYROXINE 100 MCG  Tablet] 90 tablet 1     Sig: TAKE 1 TABLET BY MOUTH DAILY.      Last office visit with prescribing clinician: 3/8/2023   Last telemedicine visit with prescribing clinician: Visit date not found   Next office visit with prescribing clinician: 9/22/2023                         Would you like a call back once the refill request has been completed: [] Yes [] No    If the office needs to give you a call back, can they leave a voicemail: [] Yes [] No    Rupinder Rodriguez LPN  08/21/23, 11:02 EDT

## 2023-10-23 ENCOUNTER — TELEPHONE (OUTPATIENT)
Dept: INTERNAL MEDICINE | Facility: CLINIC | Age: 73
End: 2023-10-23
Payer: MEDICARE

## 2023-10-23 NOTE — TELEPHONE ENCOUNTER
Appointment needed for referral.  If this is something she has discussed with Dr. Canseco in the past, she can reach out to her when she is back in the office

## 2023-10-23 NOTE — TELEPHONE ENCOUNTER
Caller: Tenisha Grayson    Relationship: Self    Best call back number: [phone]     What is the medical concern/diagnosis: PHYSICAL THERAPY FOR STRENGTHENING OF HER BACK,     What specialty or service is being requested: PHYSICAL THERAPY    What is the provider, practice or medical service name: [facility].     What is the office location: [address] [place] [address]    What is the office phone number: [phone] FAX#[phone]      Any additional details: PATIENT IS REQUESTING SHE BE SEEN AT [facility] PHYSICAL THERAPY FOR BACK STRENGTHENING.

## 2023-12-19 RX ORDER — ROSUVASTATIN CALCIUM 5 MG/1
5 TABLET, COATED ORAL NIGHTLY
Qty: 90 TABLET | Refills: 1 | Status: SHIPPED | OUTPATIENT
Start: 2023-12-19

## 2024-01-22 RX ORDER — LIOTHYRONINE SODIUM 5 UG/1
5 TABLET ORAL DAILY
Qty: 90 TABLET | Refills: 1 | Status: SHIPPED | OUTPATIENT
Start: 2024-01-22

## 2024-02-01 DIAGNOSIS — I10 BENIGN ESSENTIAL HYPERTENSION: Primary | ICD-10-CM

## 2024-02-01 DIAGNOSIS — R79.9 ABNORMAL FINDING OF BLOOD CHEMISTRY, UNSPECIFIED: ICD-10-CM

## 2024-02-01 DIAGNOSIS — E78.2 MIXED HYPERLIPIDEMIA: ICD-10-CM

## 2024-02-01 DIAGNOSIS — E03.9 ACQUIRED HYPOTHYROIDISM: ICD-10-CM

## 2024-02-01 DIAGNOSIS — E55.9 VITAMIN D DEFICIENCY: ICD-10-CM

## 2024-02-26 RX ORDER — LEVOTHYROXINE SODIUM 0.1 MG/1
100 TABLET ORAL DAILY
Qty: 90 TABLET | Refills: 1 | Status: SHIPPED | OUTPATIENT
Start: 2024-02-26

## 2024-05-20 NOTE — TELEPHONE ENCOUNTER
LVM for patient to return call    [TextEntry] : The natural progression of osteoarthritis was explained to the patient.  We discussed the possible treatment options from conservative to operative.  These included NSAIDs, Glucosamine and Chondroitin sulfate, and physical therapy as well different types of injections.  We also discussed that at some point they may progress to need a TKA.  Information and pamphlets were given.  Orthovisc injection #2 given today left knee

## 2024-07-22 RX ORDER — LOSARTAN POTASSIUM 50 MG/1
50 TABLET ORAL DAILY
Qty: 30 TABLET | Refills: 0 | Status: SHIPPED | OUTPATIENT
Start: 2024-07-22

## 2024-07-22 RX ORDER — LIOTHYRONINE SODIUM 5 UG/1
5 TABLET ORAL DAILY
Qty: 30 TABLET | Refills: 0 | Status: SHIPPED | OUTPATIENT
Start: 2024-07-22

## 2024-07-23 RX ORDER — LOSARTAN POTASSIUM 50 MG/1
TABLET ORAL
Qty: 90 TABLET | Refills: 1 | OUTPATIENT
Start: 2024-07-23

## 2024-07-23 RX ORDER — LIOTHYRONINE SODIUM 5 UG/1
5 TABLET ORAL DAILY
Qty: 90 TABLET | Refills: 1 | OUTPATIENT
Start: 2024-07-23

## 2024-07-25 ENCOUNTER — LAB (OUTPATIENT)
Dept: LAB | Facility: HOSPITAL | Age: 74
End: 2024-07-25
Payer: MEDICARE

## 2024-07-25 DIAGNOSIS — E03.9 ACQUIRED HYPOTHYROIDISM: ICD-10-CM

## 2024-07-25 DIAGNOSIS — E78.2 MIXED HYPERLIPIDEMIA: ICD-10-CM

## 2024-07-25 DIAGNOSIS — E55.9 VITAMIN D DEFICIENCY: ICD-10-CM

## 2024-07-25 DIAGNOSIS — R79.9 ABNORMAL FINDING OF BLOOD CHEMISTRY, UNSPECIFIED: ICD-10-CM

## 2024-07-25 DIAGNOSIS — I10 BENIGN ESSENTIAL HYPERTENSION: ICD-10-CM

## 2024-07-25 LAB
25(OH)D3 SERPL-MCNC: 38 NG/ML (ref 30–100)
ALBUMIN SERPL-MCNC: 4.2 G/DL (ref 3.5–5.2)
ALBUMIN UR-MCNC: 37.3 MG/DL
ALBUMIN/GLOB SERPL: 1.2 G/DL
ALP SERPL-CCNC: 59 U/L (ref 39–117)
ALT SERPL W P-5'-P-CCNC: 14 U/L (ref 1–33)
ANION GAP SERPL CALCULATED.3IONS-SCNC: 12.6 MMOL/L (ref 5–15)
AST SERPL-CCNC: 18 U/L (ref 1–32)
BACTERIA UR QL AUTO: ABNORMAL /HPF
BASOPHILS # BLD AUTO: 0.08 10*3/MM3 (ref 0–0.2)
BASOPHILS NFR BLD AUTO: 0.9 % (ref 0–1.5)
BILIRUB SERPL-MCNC: 0.3 MG/DL (ref 0–1.2)
BILIRUB UR QL STRIP: NEGATIVE
BUN SERPL-MCNC: 10 MG/DL (ref 8–23)
BUN/CREAT SERPL: 11.5 (ref 7–25)
CALCIUM SPEC-SCNC: 9.7 MG/DL (ref 8.6–10.5)
CHLORIDE SERPL-SCNC: 100 MMOL/L (ref 98–107)
CHOLEST SERPL-MCNC: 167 MG/DL (ref 0–200)
CLARITY UR: ABNORMAL
CO2 SERPL-SCNC: 23.4 MMOL/L (ref 22–29)
COLOR UR: YELLOW
CREAT SERPL-MCNC: 0.87 MG/DL (ref 0.57–1)
CREAT UR-MCNC: 84.8 MG/DL
DEPRECATED RDW RBC AUTO: 41.6 FL (ref 37–54)
EGFRCR SERPLBLD CKD-EPI 2021: 70.5 ML/MIN/1.73
EOSINOPHIL # BLD AUTO: 0.45 10*3/MM3 (ref 0–0.4)
EOSINOPHIL NFR BLD AUTO: 5.2 % (ref 0.3–6.2)
ERYTHROCYTE [DISTWIDTH] IN BLOOD BY AUTOMATED COUNT: 11.8 % (ref 12.3–15.4)
GLOBULIN UR ELPH-MCNC: 3.5 GM/DL
GLUCOSE SERPL-MCNC: 87 MG/DL (ref 65–99)
GLUCOSE UR STRIP-MCNC: NEGATIVE MG/DL
HBA1C MFR BLD: 5.4 % (ref 4.8–5.6)
HCT VFR BLD AUTO: 47.8 % (ref 34–46.6)
HDLC SERPL-MCNC: 61 MG/DL (ref 40–60)
HGB BLD-MCNC: 16.2 G/DL (ref 12–15.9)
HGB UR QL STRIP.AUTO: ABNORMAL
HYALINE CASTS UR QL AUTO: ABNORMAL /LPF
IMM GRANULOCYTES # BLD AUTO: 0.01 10*3/MM3 (ref 0–0.05)
IMM GRANULOCYTES NFR BLD AUTO: 0.1 % (ref 0–0.5)
KETONES UR QL STRIP: NEGATIVE
LDLC SERPL CALC-MCNC: 79 MG/DL (ref 0–100)
LDLC/HDLC SERPL: 1.21 {RATIO}
LEUKOCYTE ESTERASE UR QL STRIP.AUTO: ABNORMAL
LYMPHOCYTES # BLD AUTO: 1.5 10*3/MM3 (ref 0.7–3.1)
LYMPHOCYTES NFR BLD AUTO: 17.4 % (ref 19.6–45.3)
MCH RBC QN AUTO: 33.1 PG (ref 26.6–33)
MCHC RBC AUTO-ENTMCNC: 33.9 G/DL (ref 31.5–35.7)
MCV RBC AUTO: 97.6 FL (ref 79–97)
MICROALBUMIN/CREAT UR: 439.9 MG/G (ref 0–29)
MONOCYTES # BLD AUTO: 0.71 10*3/MM3 (ref 0.1–0.9)
MONOCYTES NFR BLD AUTO: 8.2 % (ref 5–12)
NEUTROPHILS NFR BLD AUTO: 5.89 10*3/MM3 (ref 1.7–7)
NEUTROPHILS NFR BLD AUTO: 68.2 % (ref 42.7–76)
NITRITE UR QL STRIP: NEGATIVE
NRBC BLD AUTO-RTO: 0 /100 WBC (ref 0–0.2)
PH UR STRIP.AUTO: 6.5 [PH] (ref 5–8)
PLATELET # BLD AUTO: 382 10*3/MM3 (ref 140–450)
PMV BLD AUTO: 10.4 FL (ref 6–12)
POTASSIUM SERPL-SCNC: 4.6 MMOL/L (ref 3.5–5.2)
PROT SERPL-MCNC: 7.7 G/DL (ref 6–8.5)
PROT UR QL STRIP: ABNORMAL
RBC # BLD AUTO: 4.9 10*6/MM3 (ref 3.77–5.28)
RBC # UR STRIP: ABNORMAL /HPF
REF LAB TEST METHOD: ABNORMAL
SODIUM SERPL-SCNC: 136 MMOL/L (ref 136–145)
SP GR UR STRIP: 1.01 (ref 1–1.03)
SQUAMOUS #/AREA URNS HPF: ABNORMAL /HPF
T3FREE SERPL-MCNC: 2.2 PG/ML (ref 2–4.4)
T4 FREE SERPL-MCNC: 1.28 NG/DL (ref 0.92–1.68)
TRIGL SERPL-MCNC: 160 MG/DL (ref 0–150)
TSH SERPL DL<=0.05 MIU/L-ACNC: 4.37 UIU/ML (ref 0.27–4.2)
UROBILINOGEN UR QL STRIP: ABNORMAL
VLDLC SERPL-MCNC: 27 MG/DL (ref 5–40)
WBC # UR STRIP: ABNORMAL /HPF
WBC NRBC COR # BLD AUTO: 8.64 10*3/MM3 (ref 3.4–10.8)

## 2024-07-25 PROCEDURE — 84443 ASSAY THYROID STIM HORMONE: CPT

## 2024-07-25 PROCEDURE — 81001 URINALYSIS AUTO W/SCOPE: CPT

## 2024-07-25 PROCEDURE — 85025 COMPLETE CBC W/AUTO DIFF WBC: CPT

## 2024-07-25 PROCEDURE — 82306 VITAMIN D 25 HYDROXY: CPT

## 2024-07-25 PROCEDURE — 82570 ASSAY OF URINE CREATININE: CPT

## 2024-07-25 PROCEDURE — 80061 LIPID PANEL: CPT

## 2024-07-25 PROCEDURE — 82043 UR ALBUMIN QUANTITATIVE: CPT

## 2024-07-25 PROCEDURE — 84481 FREE ASSAY (FT-3): CPT

## 2024-07-25 PROCEDURE — 84439 ASSAY OF FREE THYROXINE: CPT

## 2024-07-25 PROCEDURE — 80053 COMPREHEN METABOLIC PANEL: CPT

## 2024-07-25 PROCEDURE — 83036 HEMOGLOBIN GLYCOSYLATED A1C: CPT

## 2024-07-27 DIAGNOSIS — R80.9 MICROPROTEINURIA: Primary | ICD-10-CM

## 2024-07-29 RX ORDER — ROSUVASTATIN CALCIUM 5 MG/1
5 TABLET, COATED ORAL NIGHTLY
Qty: 90 TABLET | Refills: 1 | Status: SHIPPED | OUTPATIENT
Start: 2024-07-29

## 2024-08-28 ENCOUNTER — OFFICE VISIT (OUTPATIENT)
Dept: INTERNAL MEDICINE | Facility: CLINIC | Age: 74
End: 2024-08-28
Payer: MEDICARE

## 2024-08-28 VITALS
SYSTOLIC BLOOD PRESSURE: 120 MMHG | HEIGHT: 61 IN | DIASTOLIC BLOOD PRESSURE: 70 MMHG | WEIGHT: 145.2 LBS | BODY MASS INDEX: 27.41 KG/M2 | HEART RATE: 74 BPM | TEMPERATURE: 98.2 F | OXYGEN SATURATION: 97 %

## 2024-08-28 DIAGNOSIS — Z00.00 ANNUAL PHYSICAL EXAM: ICD-10-CM

## 2024-08-28 DIAGNOSIS — I10 BENIGN ESSENTIAL HYPERTENSION: ICD-10-CM

## 2024-08-28 DIAGNOSIS — E55.9 VITAMIN D DEFICIENCY: ICD-10-CM

## 2024-08-28 DIAGNOSIS — K59.09 CHRONIC CONSTIPATION: Chronic | ICD-10-CM

## 2024-08-28 DIAGNOSIS — R80.1 PERSISTENT PROTEINURIA: ICD-10-CM

## 2024-08-28 DIAGNOSIS — E66.3 OVERWEIGHT WITH BODY MASS INDEX (BMI) OF 27 TO 27.9 IN ADULT: Chronic | ICD-10-CM

## 2024-08-28 DIAGNOSIS — M18.0 ARTHRITIS OF CARPOMETACARPAL (CMC) JOINT OF BOTH THUMBS: Chronic | ICD-10-CM

## 2024-08-28 DIAGNOSIS — E78.2 MIXED HYPERLIPIDEMIA: ICD-10-CM

## 2024-08-28 DIAGNOSIS — E03.9 ACQUIRED HYPOTHYROIDISM: ICD-10-CM

## 2024-08-28 DIAGNOSIS — M85.89 OSTEOPENIA OF MULTIPLE SITES: Chronic | ICD-10-CM

## 2024-08-28 DIAGNOSIS — Z00.00 MEDICARE ANNUAL WELLNESS VISIT, SUBSEQUENT: Primary | ICD-10-CM

## 2024-08-28 RX ORDER — PHENTERMINE HYDROCHLORIDE 37.5 MG/1
37.5 TABLET ORAL DAILY
Qty: 30 TABLET | Refills: 0 | Status: SHIPPED | OUTPATIENT
Start: 2024-08-28 | End: 2024-08-28

## 2024-08-28 RX ORDER — LEVOTHYROXINE SODIUM 100 UG/1
100 TABLET ORAL DAILY
Qty: 90 TABLET | Refills: 1 | Status: SHIPPED | OUTPATIENT
Start: 2024-08-28

## 2024-08-28 RX ORDER — PHENTERMINE HYDROCHLORIDE 37.5 MG/1
37.5 TABLET ORAL DAILY
Start: 2024-08-28

## 2024-08-28 RX ORDER — LIOTHYRONINE SODIUM 5 UG/1
10 TABLET ORAL DAILY
Qty: 60 TABLET | Refills: 5 | Status: SHIPPED | OUTPATIENT
Start: 2024-08-28

## 2024-08-28 RX ORDER — LOSARTAN POTASSIUM 50 MG/1
50 TABLET ORAL DAILY
Qty: 90 TABLET | Refills: 1 | Status: SHIPPED | OUTPATIENT
Start: 2024-08-28

## 2024-08-28 RX ORDER — POLYETHYLENE GLYCOL 3350 17 G/17G
17 POWDER, FOR SOLUTION ORAL DAILY
Start: 2024-08-28

## 2024-08-28 NOTE — PATIENT INSTRUCTIONS
Problem List Items Addressed This Visit          Cardiac and Vasculature    Benign essential hypertension    Overview     Taking losartan 50 mg tablet daily.               Relevant Medications    losartan (COZAAR) 50 MG tablet    Other Relevant Orders    ECG 12 Lead    Mixed hyperlipidemia    Overview     Taking rosuvastatin every evening.         Relevant Medications    rosuvastatin (CRESTOR) 5 MG tablet    Other Relevant Orders    Comprehensive Metabolic Panel    Lipid Panel       Endocrine and Metabolic    Vitamin D deficiency    Acquired hypothyroidism    Overview     Taking levothyroxine 100 mcg daily and liothyronine 5 mcg tablet daily.             Relevant Medications    liothyronine (CYTOMEL) 5 MCG tablet    levothyroxine (SYNTHROID, LEVOTHROID) 100 MCG tablet    Other Relevant Orders    T3, Free    T4, Free    TSH       Gastrointestinal Abdominal     Chronic constipation (Chronic)       Genitourinary and Reproductive     Persistent proteinuria       Health Encounters    Medicare annual wellness visit, subsequent - Primary    Annual physical exam       Musculoskeletal and Injuries    Arthritis of carpometacarpal (CMC) joint of both thumbs (Chronic)    Osteopenia of multiple sites (Chronic)    Overview     8/17/2018 DEXA showed normal spine T-scores, left femoral neck T score was -1.9, right femoral neck T score was -2.2.      8/2023 DEXA shows osteopenia has improved a little.  The lowest T score is in the femoral neck at -1.7.                   Other    Overweight with body mass index (BMI) of 27 to 27.9 in adult (Chronic)    Relevant Medications    phentermine (ADIPEX-P) 37.5 MG tablet     Exercising to Stay Healthy  To become healthy and stay healthy, it is recommended that you do moderate-intensity and vigorous-intensity exercise. You can tell that you are exercising at a moderate intensity if your heart starts beating faster and you start breathing faster but can still hold a conversation. You can  tell that you are exercising at a vigorous intensity if you are breathing much harder and faster and cannot hold a conversation while exercising.  How can exercise benefit me?  Exercising regularly is important. It has many health benefits, such as:  Improving overall fitness, flexibility, and endurance.  Increasing bone density.  Helping with weight control.  Decreasing body fat.  Increasing muscle strength and endurance.  Reducing stress and tension, anxiety, depression, or anger.  Improving overall health.  What guidelines should I follow while exercising?  Before you start a new exercise program, talk with your health care provider.  Do not exercise so much that you hurt yourself, feel dizzy, or get very short of breath.  Wear comfortable clothes and wear shoes with good support.  Drink plenty of water while you exercise to prevent dehydration or heat stroke.  Work out until your breathing and your heartbeat get faster (moderate intensity).  How often should I exercise?  Choose an activity that you enjoy, and set realistic goals. Your health care provider can help you make an activity plan that is individually designed and works best for you.  Exercise regularly as told by your health care provider. This may include:  Doing strength training two times a week, such as:  Lifting weights.  Using resistance bands.  Push-ups.  Sit-ups.  Yoga.  Doing a certain intensity of exercise for a given amount of time. Choose from these options:  A total of 150 minutes of moderate-intensity exercise every week.  A total of 75 minutes of vigorous-intensity exercise every week.  A mix of moderate-intensity and vigorous-intensity exercise every week.  Children, pregnant women, people who have not exercised regularly, people who are overweight, and older adults may need to talk with a health care provider about what activities are safe to perform. If you have a medical condition, be sure to talk with your health care provider  before you start a new exercise program.  What are some exercise ideas?  Moderate-intensity exercise ideas include:  Walking 1 mile (1.6 km) in about 15 minutes.  Biking.  Hiking.  Golfing.  Dancing.  Water aerobics.  Vigorous-intensity exercise ideas include:  Walking 4.5 miles (7.2 km) or more in about 1 hour.  Jogging or running 5 miles (8 km) in about 1 hour.  Biking 10 miles (16.1 km) or more in about 1 hour.  Lap swimming.  Roller-skating or in-line skating.  Cross-country skiing.  Vigorous competitive sports, such as football, basketball, and soccer.  Jumping rope.  Aerobic dancing.  What are some everyday activities that can help me get exercise?  Yard work, such as:  Pushing a .  Raking and bagging leaves.  Washing your car.  Pushing a stroller.  Shoveling snow.  Gardening.  Washing windows or floors.  How can I be more active in my day-to-day activities?  Use stairs instead of an elevator.  Take a walk during your lunch break.  If you drive, park your car farther away from your work or school.  If you take public transportation, get off one stop early and walk the rest of the way.  Stand up or walk around during all of your indoor phone calls.  Get up, stretch, and walk around every 30 minutes throughout the day.  Enjoy exercise with a friend. Support to continue exercising will help you keep a regular routine of activity.  Where to find more information  You can find more information about exercising to stay healthy from:  [place] Department of Health and Human Services: www.hhs.gov  Centers for Disease Control and Prevention (CDC): www.cdc.gov  Summary  Exercising regularly is important. It will improve your overall fitness, flexibility, and endurance.  Regular exercise will also improve your overall health. It can help you control your weight, reduce stress, and improve your bone density.  Do not exercise so much that you hurt yourself, feel dizzy, or get very short of breath.  Before you start a  "new exercise program, talk with your health care provider.  This information is not intended to replace advice given to you by your health care provider. Make sure you discuss any questions you have with your health care provider.  Document Revised: 04/15/2022 Document Reviewed: 04/15/2022  trend.ly Patient Education © 2023 [employer] BMI for Adults  Body mass index (BMI) is a number found using a person's weight and height. BMI can help tell how much of a person's weight is made up of fat. BMI does not measure body fat directly. It is used instead of tests that directly measure body fat, which can be difficult and expensive.  What are BMI measurements used for?  BMI is useful to:  Find out if your weight puts you at higher risk for medical problems.  Help recommend changes, such as in diet and exercise. This can help you reach a healthy weight. BMI screening can be done again to see if these changes are working.  How is BMI calculated?  Your height and weight are measured. The BMI is found from those numbers. This can be done with [place] or metric measurements. Note that charts and online BMI calculators are available to help you find your BMI quickly and easily without doing these calculations.  To calculate your BMI in [place] measurements:  Measure your weight in pounds (lb).  Multiply the number of pounds by 703.  So, for an adult who weighs 150 lb, multiply that number by 703: 150 x 703, which equals 105,450.  Measure your height in inches. Then multiply that number by itself to get a measurement called \"inches squared.\"  So, for an adult who is 70 inches tall, the \"inches squared\" measurement is 70 inches x 70 inches, which equals 4,900 inches squared.  Divide the total from step 2 (number of lb x 703) by the total from step 3 (inches squared): 105,450 ÷ 4,900 = 21.5. This is your BMI.  To calculate your BMI in metric measurements:    Measure your weight in kilograms (kg).  For this example, the weight is 70 " "kg.  Measure your height in meters (m). Then multiply that number by itself to get a measurement called \"meters squared.\"  So, for an adult who is 1.75 m tall, the \"meters squared\" measurement is 1.75 m x 1.75 m, which equals 3.1 meters squared.  Divide the number of kilograms (your weight) by the meters squared number. In this example: 70 ÷ 3.1 = 22.6. This is your BMI.  What do the results mean?  BMI charts are used to see if you are underweight, normal weight, overweight, or obese. The following guidelines will be used:  Underweight: BMI less than 18.5.  Normal weight: BMI between 18.5 and 24.9.  Overweight: BMI between 25 and 29.9.  Obese: BMI of 30 or above.  BMI is a tool and cannot diagnose a condition. Talk with your health care provider about what your BMI means for you. Keep these notes in mind:  Weight includes fat and muscle. Someone with a muscular build, such as an athlete, may have a BMI that is higher than 24.9. In cases like these, BMI is not a correct measure of body fat.  If you have a BMI of 25 or higher, your provider may need to do more testing to find out if excess body fat is the cause.  BMI is measured the same way for males and females. Females usually have more body fat than males of the same height and weight.  Where to find more information  For more information about BMI, including tools to quickly find your BMI, go to:  Centers for Disease Control and Prevention: cdc.gov  American Heart Association: heart.org  National Heart, Lung, and Blood [place]: nhlbi.nih.gov  This information is not intended to replace advice given to you by your health care provider. Make sure you discuss any questions you have with your health care provider.  Document Revised: 09/07/2023 Document Reviewed: 08/31/2023  Elsevier Patient Education © 2024 Elsevier Inc.    "

## 2024-08-28 NOTE — PROGRESS NOTES
Subjective   The ABCs of the Annual Wellness Visit  Medicare Wellness Visit      Tenisha Grayson is a 74 y.o. patient who presents for a Medicare Wellness Visit.    The following portions of the patient's history were reviewed and   updated as appropriate: allergies, current medications, past family history, past medical history, past social history, past surgical history, and problem list.    Compared to one year ago, the patient's physical   health is the same.  Compared to one year ago, the patient's mental   health is the same.    Recent Hospitalizations:  She was not admitted to the hospital during the last year.     Current Medical Providers:  Patient Care Team:  Doris Canseco MD as PCP - General (Internal Medicine)  Deisy Sumner MD as Consulting Physician (Dermatology)    Outpatient Medications Prior to Visit   Medication Sig Dispense Refill    aspirin 325 MG tablet Take 1 tablet by mouth Every Other Day.      Calcium-Magnesium-Zinc 333-133-5 MG tablet Take 1 tablet by mouth Every 30 (Thirty) Days.      Coenzyme Q10 400 MG capsule Take 1 capsule by mouth Every Night. 90 capsule 3    Hormone Cream Base cream Apply 2 clicks (0.5mL) to inner arm or thigh each day  Estradiol/Estriol/Testosterone Versabase CR 0.2/0.8/1mg/0.5mL 15 g 5    MULTIPLE VITAMIN PO Take 1 tablet by mouth Daily.      progesterone (PROMETRIUM) 200 MG capsule Take 2 capsules by mouth 1-2 hours before bedtime 180 capsule 1    rosuvastatin (CRESTOR) 5 MG tablet TAKE 1 TABLET BY MOUTH EVERY NIGHT. 90 tablet 1    levothyroxine (SYNTHROID, LEVOTHROID) 100 MCG tablet TAKE 1 TABLET BY MOUTH DAILY. 90 tablet 1    liothyronine (CYTOMEL) 5 MCG tablet Take 1 tablet by mouth Daily. 30 tablet 0    losartan (COZAAR) 50 MG tablet Take 1 tablet by mouth Daily. 30 tablet 0    phentermine (ADIPEX-P) 37.5 MG tablet TAKE 1 TABLET BY MOUTH DAILY 30 tablet 0    vitamin D3 125 MCG (5000 UT) capsule capsule Take 1 capsule by mouth Daily. When she  "remembers 30 capsule 11     No facility-administered medications prior to visit.     No opioid medication identified on active medication list. I have reviewed chart for other potential  high risk medication/s and harmful drug interactions in the elderly.      Aspirin is on active medication list. Aspirin use is indicated based on review of current medical condition/s. Pros and cons of this therapy have been discussed today. Benefits of this medication outweigh potential harm.  Patient has been encouraged to continue taking this medication.  .      Patient Active Problem List   Diagnosis    COPD, moderate    Adnexal mass    Tobacco use    Vitamin D deficiency    Menopausal and postmenopausal disorder    Acquired hypothyroidism    Benign essential hypertension    Other fatigue    Left bundle branch block (LBBB) determined by electrocardiography    Stress and adjustment reaction    Screening for colon cancer    Mixed hyperlipidemia    Osteopenia of multiple sites    Overweight with body mass index (BMI) of 27 to 27.9 in adult    Diverticulosis    Chronic constipation    Medicare annual wellness visit, subsequent    Annual physical exam    Screening mammogram, encounter for    Arthritis of carpometacarpal (CMC) joint of both thumbs    Rash    Herpes zoster without complication    Persistent proteinuria     Advance Care Planning Advance Directive is not on file.  ACP discussion was held with the patient during this visit. Patient does not have an advance directive, information provided.            Objective   Vitals:    08/28/24 1040   BP: 120/70   BP Location: Left arm   Patient Position: Sitting   Cuff Size: Adult   Pulse: 74   Temp: 98.2 °F (36.8 °C)   SpO2: 97%   Weight: 65.9 kg (145 lb 3.2 oz)   Height: 154 cm (60.63\")   PainSc: 0-No pain       Estimated body mass index is 27.77 kg/m² as calculated from the following:    Height as of this encounter: 154 cm (60.63\").    Weight as of this encounter: 65.9 kg (145 lb " 3.2 oz).    BMI is >= 25 and <30. (Overweight) The following options were offered after discussion;: weight loss educational material (shared in after visit summary), exercise counseling/recommendations, nutrition counseling/recommendations, pharmacological intervention options, and information on healthy weight added to patient's after visit summary        Does the patient have evidence of cognitive impairment? No  Lab Results   Component Value Date    TRIG 160 (H) 2024    HDL 61 (H) 2024    LDL 79 2024    VLDL 27 2024    HGBA1C 5.40 2024       ECG 12 Lead    Date/Time: 2024 3:51 PM  Performed by: Doris Canseco MD    Authorized by: Doris Canseco MD  Comparison: compared with previous ECG   Similar to previous ECG  Rhythm: sinus rhythm  Rate: normal  BPM: 66  Conduction: left bundle branch block  T Waves: T waves normal    Clinical impression: abnormal EKG                                                                                                Health  Risk Assessment    Smoking Status:  Social History     Tobacco Use   Smoking Status Some Days    Current packs/day: 1.00    Types: Cigarettes   Smokeless Tobacco Never   Tobacco Comments    Stopped 18     Alcohol Consumption:  Social History     Substance and Sexual Activity   Alcohol Use Yes       Fall Risk Screen  STEADI Fall Risk Assessment was completed, and patient is at LOW risk for falls.Assessment completed on:2024    Depression Screenin/28/2024    10:36 AM   PHQ-2/PHQ-9 Depression Screening   Little Interest or Pleasure in Doing Things 1-->several days   Feeling Down, Depressed or Hopeless 0-->not at all   PHQ-9: Brief Depression Severity Measure Score 1     Health Habits and Functional and Cognitive Screenin/28/2024    10:35 AM   Functional & Cognitive Status   Do you have difficulty preparing food and eating? No   Do you have difficulty bathing yourself, getting dressed or grooming  yourself? No   Do you have difficulty using the toilet? No   Do you have difficulty moving around from place to place? No   Do you have trouble with steps or getting out of a bed or a chair? No   Current Diet Well Balanced Diet   Dental Exam Up to date   Eye Exam Up to date   Exercise (times per week) 2 times per week   Current Exercises Include Walking   Do you need help using the phone?  No   Are you deaf or do you have serious difficulty hearing?  No   Do you need help to go to places out of walking distance? No   Do you need help shopping? No   Do you need help preparing meals?  No   Do you need help with housework?  No   Do you need help with laundry? No   Do you need help taking your medications? No   Do you need help managing money? No   Do you ever drive or ride in a car without wearing a seat belt? No   Have you felt unusual stress, anger or loneliness in the last month? No   Who do you live with? Spouse   If you need help, do you have trouble finding someone available to you? No   Have you been bothered in the last four weeks by sexual problems? No   Do you have difficulty concentrating, remembering or making decisions? No           Age-appropriate Screening Schedule:  Refer to the list below for future screening recommendations based on patient's age, sex and/or medical conditions. Orders for these recommended tests are listed in the plan section. The patient has been provided with a written plan.    Health Maintenance List  Health Maintenance   Topic Date Due    ZOSTER VACCINE (2 of 3) 07/22/2016    COVID-19 Vaccine (5 - 2023-24 season) 09/01/2023    INFLUENZA VACCINE  08/01/2024    MAMMOGRAM  06/21/2025    LIPID PANEL  07/25/2025    DXA SCAN  08/08/2025    ANNUAL WELLNESS VISIT  08/28/2025    BMI FOLLOWUP  08/28/2025    TDAP/TD VACCINES (3 - Td or Tdap) 04/19/2028    COLORECTAL CANCER SCREENING  09/04/2030    HEPATITIS C SCREENING  Completed    Pneumococcal Vaccine 65+  Completed    PAP SMEAR   Discontinued                                                                                                                                                CMS Preventative Services Quick Reference  Risk Factors Identified During Encounter  Fall Risk-High or Moderate: Discussed Fall Prevention in the home and Information on Fall Prevention Shared in After Visit Summary  Inactivity/Sedentary: Patient was advised to exercise at least 150 minutes a week per CDC recommendations.    The above risks/problems have been discussed with the patient.  Pertinent information has been shared with the patient in the After Visit Summary.  An After Visit Summary and PPPS were made available to the patient.    Follow Up:   Next Medicare Wellness visit to be scheduled in 1 year.         Additional E&M Note during same encounter follows:  Patient has additional, significant, and separately identifiable condition(s)/problem(s) that require work above and beyond the Medicare Wellness Visit     Chief Complaint  Medicare Wellness-subsequent and Hypertension    Subjective    HPI  Tenisha is also being seen today for an annual adult preventative physical exam.        The patient presents for an annual wellness visit.    She reports no significant health issues, except for occasional constipation, which she manages with MiraLAX.    She has been using terzepatide for weight management for the past 4 months, resulting in a weight loss of 14 pounds. Her physical activity is limited due to her 's recent fibula fracture. She maintains a low-fat diet, consuming small portions and incorporating chopped vegetables. She also drinks 1 or 2 protein shakes daily.    She is on hormone therapy, including testosterone and estrogen pellets, and takes progesterone capsules in the evening. She believes this regimen helps manage her arthritis.    She also takes rosuvastatin 5 mg every evening for cholesterol control and losartan for blood pressure  "management.    She had a skin lesion removed by Dr. Guajardo and underwent baggy eyelid surgery in April 2024. She has not had a mammogram since June 2023.    She injured her hip flexor while carrying a [place] tree from her basement, which required physical therapy.    She has not received the shingles vaccine.    She was informed that her thyroid levels were low during her last lab work. She has noticed hair thinning over the past few months. She is currently taking liothyronine 5 mcg and levothyroxine 100 mcg daily.    She took phentermine last year, which she found beneficial for energy and appetite control. She is considering resuming it at a lower dose.    She was advised to consult a nephrologist last year, but she did not follow through. She is concerned about her kidney function and wonders if her protein intake could be contributing to any issues.          Objective   Vital Signs:  /70 (BP Location: Left arm, Patient Position: Sitting, Cuff Size: Adult)   Pulse 74   Temp 98.2 °F (36.8 °C)   Ht 154 cm (60.63\")   Wt 65.9 kg (145 lb 3.2 oz)   SpO2 97%   BMI 27.77 kg/m²   Physical Exam  Vitals and nursing note reviewed.   Constitutional:       Appearance: She is well-developed and overweight.   HENT:      Head: Normocephalic.   Eyes:      Conjunctiva/sclera: Conjunctivae normal.      Pupils: Pupils are equal, round, and reactive to light.   Neck:      Thyroid: No thyromegaly.   Cardiovascular:      Rate and Rhythm: Normal rate and regular rhythm.      Heart sounds: Normal heart sounds.   Pulmonary:      Effort: Pulmonary effort is normal.      Breath sounds: Normal breath sounds. No wheezing.   Chest:   Breasts:     Right: No inverted nipple, mass, nipple discharge, skin change or tenderness.      Left: No inverted nipple, mass, nipple discharge, skin change or tenderness.   Abdominal:      General: Bowel sounds are normal.      Palpations: Abdomen is soft.      Tenderness: There is no abdominal " tenderness.   Musculoskeletal:         General: No tenderness. Normal range of motion.      Cervical back: Normal range of motion and neck supple.      Right lower leg: No edema.      Left lower leg: No edema.   Lymphadenopathy:      Cervical: No cervical adenopathy.      Upper Body:      Right upper body: No supraclavicular, axillary or pectoral adenopathy.      Left upper body: No supraclavicular, axillary or pectoral adenopathy.   Skin:     General: Skin is warm and dry.      Findings: No rash.   Neurological:      Mental Status: She is alert and oriented to person, place, and time.      Cranial Nerves: No cranial nerve deficit.      Sensory: No sensory deficit.      Coordination: Coordination normal.      Gait: Gait normal.   Psychiatric:         Attention and Perception: Attention normal.         Mood and Affect: Mood normal.         Speech: Speech normal.         Behavior: Behavior normal.         Thought Content: Thought content normal.         Cognition and Memory: Cognition normal.         Judgment: Judgment normal.               The following data was reviewed by: Doris Canseco MD on 08/28/2024:         Assessment and Plan Additional age appropriate preventative wellness advice topics were discussed during today's preventative wellness exam(some topics already addressed during AWV portion of the note above):    Physical Activity: Advised cardiovascular activity 150 minutes per week as tolerated. (example brisk walk for 30 minutes, 5 days a week).     Nutrition: Discussed nutrition plan with patient. Information shared in after visit summary. Goal is for a well balanced diet to enhance overall health.     Healthy Weight: Discussed current and goal BMI with patient. Steps to attain this goal discussed. Information shared in after visit summary.          Benign essential hypertension.  She will continue taking losartan 50 mg daily. She is advised to avoid salt in her diet and engage in regular  exercise.    Mixed hyperlipidemia.  She will continue taking rosuvastatin 5 mg every evening. She is encouraged to maintain a low-fat, healthy diet and increase physical activity and walking.      Chronic constipation.  She reports ongoing issues with constipation and has been using MiraLAX intermittently. She is advised to use MiraLAX daily, starting with a whole packet each morning. If stools become too loose, she should reduce the amount to half or a third of the packet, but continue taking some every day. MiraLAX can be mixed with hot coffee, tea, or lukewarm water to minimize grittiness.    Overweight with BMI of 27   She has lost 14 pounds over the past 4 months using tirzepatide 7.5 mg injection weekly. She is encouraged to continue this medication.  She may resume taking phentermine every morning.  Prescription was sent to the pharmacy.  She should watch for elevation of blood pressure or insomnia or palpitations.  She is also advised to increase physical activity, including walking and possibly joining exercise classes like Silver Sneakers or Pilates or bettye chi. Online classes are an option if she is concerned about COVID-19 exposure.  Continue to decrease sugars and fats in the diet and eat smaller portions at mealtime.  Weigh at home once a week to monitor progress.    Acquired hypothyroidism.  Her TSH was elevated, and free T3 was low. She will increase liothyronine to two of the 5 mcg tablets daily while continuing her current dose of levothyroxine 100 mcg daily. Thyroid labs will be rechecked in 6 weeks to determine if further adjustments are needed.    Persistent proteinuria.  Proteinuria has worsened over the last 3 years. Her GFR and creatinine remain in the normal range. She has been referred and will see a nephrologist soon to address the increased protein loss in her urine.    Osteopenia  She should increase weightbearing exercises with more walking and using small hand weights at home.  She will  also consider classes such as Silver sneakers or bettye chi or Pilates.  Continue taking vitamin D3 and calcium.    Arthritis of CMC joints  She may use Tylenol arthritis as needed and/or over-the-counter arthritis creams.    Medicare annual wellness visit  She will get a flu shot in September 2024 and a COVID-19 booster in October 2024. She is up to date on other immunizations. She will continue doing a mammogram every other year. She is up to date on DEXA and colonoscopy.    Annual physical exam   See plan above  .    Follow-up  The patient will follow up in 6 months.    Orders Placed This Encounter   Procedures    Comprehensive Metabolic Panel     Standing Status:   Future     Standing Expiration Date:   8/28/2025     Order Specific Question:   Release to patient     Answer:   Routine Release [[id number]]    Lipid Panel     Standing Status:   Future     Standing Expiration Date:   8/28/2025     Order Specific Question:   Release to patient     Answer:   Routine Release [[id number]]    T3, Free     Standing Status:   Future     Standing Expiration Date:   8/28/2025     Order Specific Question:   Release to patient     Answer:   Routine Release [[id number]]    T4, Free     Standing Status:   Future     Standing Expiration Date:   8/28/2025     Order Specific Question:   Release to patient     Answer:   Routine Release [[id number]]    TSH     Standing Status:   Future     Standing Expiration Date:   8/28/2025     Order Specific Question:   Release to patient     Answer:   Routine Release [[id number]]    ECG 12 Lead     This order was created via procedure documentation     Order Specific Question:   Release to patient     Answer:   Routine Release [[id number]]     New Medications Ordered This Visit   Medications    Tirzepatide (MOUNJARO) 7.5 MG/0.5ML solution pen-injector pen     Sig: Inject 0.5 mL under the skin into the appropriate area as directed 1 (One) Time Per Week.    liothyronine (CYTOMEL) 5 MCG tablet      Sig: Take 2 tablets by mouth Daily.     Dispense:  60 tablet     Refill:  5    levothyroxine (SYNTHROID, LEVOTHROID) 100 MCG tablet     Sig: Take 1 tablet by mouth Daily.     Dispense:  90 tablet     Refill:  1    losartan (COZAAR) 50 MG tablet     Sig: Take 1 tablet by mouth Daily.     Dispense:  90 tablet     Refill:  1    vitamin D3 125 MCG (5000 UT) capsule capsule     Sig: Take 1 capsule by mouth Daily. When she remembers     Dispense:  90 capsule     Refill:  1    phentermine (ADIPEX-P) 37.5 MG tablet     Sig: Take 1 tablet by mouth Daily.    polyethylene glycol (MIRALAX) 17 g packet     Sig: Take 17 g by mouth Daily.          Follow Up   Return in about 6 months (around 2/28/2025) for recheck fasting.  Patient was given instructions and counseling regarding her condition or for health maintenance advice. Please see specific information pulled into the AVS if appropriate.  Patient or patient representative verbalized consent for the use of Ambient Listening during the visit with  Doris Canseco MD for chart documentation. 8/28/2024  15:46 EDT

## 2024-10-16 ENCOUNTER — LAB (OUTPATIENT)
Dept: LAB | Facility: HOSPITAL | Age: 74
End: 2024-10-16
Payer: MEDICARE

## 2024-10-16 DIAGNOSIS — E03.9 ACQUIRED HYPOTHYROIDISM: ICD-10-CM

## 2024-10-16 DIAGNOSIS — E78.2 MIXED HYPERLIPIDEMIA: ICD-10-CM

## 2024-10-16 LAB
ALBUMIN SERPL-MCNC: 3.8 G/DL (ref 3.5–5.2)
ALBUMIN/GLOB SERPL: 1.1 G/DL
ALP SERPL-CCNC: 59 U/L (ref 39–117)
ALT SERPL W P-5'-P-CCNC: 14 U/L (ref 1–33)
ANION GAP SERPL CALCULATED.3IONS-SCNC: 11.9 MMOL/L (ref 5–15)
AST SERPL-CCNC: 17 U/L (ref 1–32)
BILIRUB SERPL-MCNC: 0.4 MG/DL (ref 0–1.2)
BUN SERPL-MCNC: 14 MG/DL (ref 8–23)
BUN/CREAT SERPL: 17.5 (ref 7–25)
CALCIUM SPEC-SCNC: 9.5 MG/DL (ref 8.6–10.5)
CHLORIDE SERPL-SCNC: 101 MMOL/L (ref 98–107)
CHOLEST SERPL-MCNC: 143 MG/DL (ref 0–200)
CO2 SERPL-SCNC: 24.1 MMOL/L (ref 22–29)
CREAT SERPL-MCNC: 0.8 MG/DL (ref 0.57–1)
EGFRCR SERPLBLD CKD-EPI 2021: 77.9 ML/MIN/1.73
GLOBULIN UR ELPH-MCNC: 3.4 GM/DL
GLUCOSE SERPL-MCNC: 90 MG/DL (ref 65–99)
HDLC SERPL-MCNC: 60 MG/DL (ref 40–60)
LDLC SERPL CALC-MCNC: 58 MG/DL (ref 0–100)
LDLC/HDLC SERPL: 0.88 {RATIO}
POTASSIUM SERPL-SCNC: 4.4 MMOL/L (ref 3.5–5.2)
PROT SERPL-MCNC: 7.2 G/DL (ref 6–8.5)
SODIUM SERPL-SCNC: 137 MMOL/L (ref 136–145)
T3FREE SERPL-MCNC: 2.14 PG/ML (ref 2–4.4)
T4 FREE SERPL-MCNC: 0.86 NG/DL (ref 0.92–1.68)
TRIGL SERPL-MCNC: 150 MG/DL (ref 0–150)
TSH SERPL DL<=0.05 MIU/L-ACNC: 4.43 UIU/ML (ref 0.27–4.2)
VLDLC SERPL-MCNC: 25 MG/DL (ref 5–40)

## 2024-10-16 PROCEDURE — 84443 ASSAY THYROID STIM HORMONE: CPT

## 2024-10-16 PROCEDURE — 80061 LIPID PANEL: CPT

## 2024-10-16 PROCEDURE — 84481 FREE ASSAY (FT-3): CPT

## 2024-10-16 PROCEDURE — 80053 COMPREHEN METABOLIC PANEL: CPT

## 2024-10-16 PROCEDURE — 84439 ASSAY OF FREE THYROXINE: CPT

## 2024-10-21 DIAGNOSIS — E03.9 ACQUIRED HYPOTHYROIDISM: Primary | ICD-10-CM

## 2024-10-21 RX ORDER — LEVOTHYROXINE SODIUM 112 UG/1
112 TABLET ORAL DAILY
Qty: 90 TABLET | Refills: 1 | Status: SHIPPED | OUTPATIENT
Start: 2024-10-21

## 2024-12-02 RX ORDER — LOSARTAN POTASSIUM 50 MG/1
50 TABLET ORAL DAILY
Qty: 90 TABLET | Refills: 1 | Status: SHIPPED | OUTPATIENT
Start: 2024-12-02

## 2024-12-30 DIAGNOSIS — E66.3 OVERWEIGHT WITH BODY MASS INDEX (BMI) OF 27 TO 27.9 IN ADULT: Chronic | ICD-10-CM

## 2024-12-30 RX ORDER — PHENTERMINE HYDROCHLORIDE 37.5 MG/1
37.5 TABLET ORAL DAILY
Qty: 30 TABLET | Refills: 0 | Status: SHIPPED | OUTPATIENT
Start: 2024-12-30

## 2024-12-30 NOTE — TELEPHONE ENCOUNTER
Rx Refill Note  Requested Prescriptions     Pending Prescriptions Disp Refills    vitamin D3 125 MCG (5000 UT) capsule capsule [Pharmacy Med Name: VITAMIN D3 5000 UNIT  MCG Capsule] 90 capsule 1     Sig: TAKE 1 CAPSULE BY MOUTH DAILY.    phentermine (ADIPEX-P) 37.5 MG tablet [Pharmacy Med Name: PHENTERMINE 37.5 MG TAB 37.5 Tablet] 30 tablet 0     Sig: TAKE 1 TABLET BY MOUTH DAILY.      Last refill phentermine:  8/28/24 unknown quantity   Last office visit with prescribing clinician: 8/28/2024   Last telemedicine visit with prescribing clinician: Visit date not found   Next office visit with prescribing clinician: 3/25/2025                         Would you like a call back once the refill request has been completed: [] Yes [] No    If the office needs to give you a call back, can they leave a voicemail: [] Yes [] No    Brinda Carr RN  12/30/24, 12:13 EST

## 2025-01-17 NOTE — TELEPHONE ENCOUNTER
Labs and letter electronically faxed to [employer] @ [phone].    Visited with patient today for check in. Patient was asleep upon entry but woke him up. Emergency bag with patient no visible damage. Patient has an extra set of batteries with him at this time. Patient could not stay awake no further needs or questions.

## 2025-02-24 ENCOUNTER — TELEPHONE (OUTPATIENT)
Dept: INTERNAL MEDICINE | Facility: CLINIC | Age: 75
End: 2025-02-24
Payer: MEDICARE

## 2025-02-24 NOTE — TELEPHONE ENCOUNTER
Pt states she has had the cough for over a week, no pain, no nasal congestion.  She is wanting some advice on OTC cough medicine.  I advised her to make an appt to be seen and evaluated, she was hesitant.  Please advise.

## 2025-02-24 NOTE — TELEPHONE ENCOUNTER
Caller: Tenisha Grayson    Relationship: Self    Best call back number: [phone]     What medication are you requesting: DEEP COUGH FOR PAST WEEK.     How long have you been experiencing symptoms: 1 WEEK.  MAY NEED AN ANTIBIOTIC, WHEEZING    Have you had these symptoms before:    [] Yes  [x] No    Have you been treated for these symptoms before:   [] Yes  [x] No    If a prescription is needed, what is your preferred pharmacy and phone number: [id number] - [place], [place] - [address] AT [facility] [address] . [facility] [phone] [facility] [phone] FX

## 2025-04-04 RX ORDER — LIOTHYRONINE SODIUM 5 UG/1
10 TABLET ORAL DAILY
Qty: 180 TABLET | Refills: 3 | Status: SHIPPED | OUTPATIENT
Start: 2025-04-04

## 2025-04-04 RX ORDER — LEVOTHYROXINE SODIUM 112 UG/1
112 TABLET ORAL DAILY
Qty: 90 TABLET | Refills: 3 | Status: SHIPPED | OUTPATIENT
Start: 2025-04-04

## 2025-04-04 NOTE — TELEPHONE ENCOUNTER
Caller: Kenan Tenisha R    Relationship: Self    Best call back number: [phone]     Requested Prescriptions:   Requested Prescriptions     Pending Prescriptions Disp Refills    liothyronine (CYTOMEL) 5 MCG tablet 60 tablet 5     Sig: Take 2 tablets by mouth Daily.    levothyroxine (SYNTHROID, LEVOTHROID) 112 MCG tablet 90 tablet 1     Sig: Take 1 tablet by mouth Daily.        Pharmacy where request should be sent: [facility] #[id number] [address] AT [facility] [address] [facility] [facility] [phone] [facility] [phone] FX     Last office visit with prescribing clinician: 8/28/2024   Last telemedicine visit with prescribing clinician: Visit date not found   Next office visit with prescribing clinician: Visit date not found     Additional details provided by patient: PATIENT IS LEAVING FOR [place] ON 04/06/25.    Does the patient have less than a 3 day supply:  [x] Yes  [] No    Would you like a call back once the refill request has been completed: [] Yes [] No    If the office needs to give you a call back, can they leave a voicemail: [] Yes [] No    Simran Sher Rep   04/04/25 11:08 EDT

## 2025-04-27 NOTE — PROGRESS NOTES
Encounter Date: 4/27/2025       History     Chief Complaint   Patient presents with    Otalgia     Pt arrives Pov and ambulatory in triage with C/O left ear pain and decreased hearing x1 week. No drainage noted.      See [facility] for details.      The history is provided by the patient. No  was used.     Review of patient's allergies indicates:  No Known Allergies  No past medical history on file.  No past surgical history on file.  No family history on file.  Social History[1]  Review of Systems   Constitutional: Negative.  Negative for activity change, appetite change, diaphoresis, fatigue and fever.   HENT:  Positive for ear discharge and ear pain. Negative for facial swelling, rhinorrhea and sinus pressure.    Eyes: Negative.    Respiratory: Negative.  Negative for chest tightness.    Cardiovascular:  Negative for chest pain.   Gastrointestinal: Negative.  Negative for abdominal distention and abdominal pain.   Endocrine: Negative.    Genitourinary: Negative.    Musculoskeletal: Negative.  Negative for arthralgias.   Allergic/Immunologic: Negative.    Neurological:  Negative for dizziness and headaches.   Hematological: Negative.    Psychiatric/Behavioral: Negative.     All other systems reviewed and are negative.      Physical Exam     Initial Vitals [04/27/25 1841]   BP Pulse Resp Temp SpO2   130/82 99 18 98.8 °F (37.1 °C) 98 %      MAP       --         Physical Exam    Nursing note and vitals reviewed.  Constitutional: He appears well-developed and well-nourished. He is cooperative. No distress.   HENT:   Head: Atraumatic. Not macrocephalic.   Right Ear: There is drainage and swelling. Tympanic membrane is bulging. Tympanic membrane is not erythematous. A middle ear effusion is present.   Left Ear: Tympanic membrane normal. No drainage or swelling. A foreign body is present. Tympanic membrane is not erythematous and not bulging.  No middle ear effusion.   Nose: No mucosal edema. Right sinus  QUICK REFERENCE INFORMATION:  The ABCs of the Annual Wellness Visit    Initial Medicare Wellness Visit    HEALTH RISK ASSESSMENT    1950    Recent Hospitalizations:  No hospitalization(s) within the last year..        Current Medical Providers:  Patient Care Team:  Abhishek Spence MD as PCP - General (Internal Medicine)        Smoking Status:  History   Smoking Status   • Current Every Day Smoker   • Packs/day: 1.00   • Years: 44.00   Smokeless Tobacco   • Not on file     Comment: considering stopping        Alcohol Consumption:  History   Alcohol Use   • Yes     Comment: 2 drinks/night       Depression Screen:   No flowsheet data found.    Health Habits and Functional and Cognitive Screening:  No flowsheet data found.               Does the patient have evidence of cognitive impairment? No    Asiprin use counseling: Does not need ASA (and currently is not on it)      Recent Lab Results:    Visual Acuity:  No exam data present    Age-appropriate Screening Schedule:  Refer to the list below for future screening recommendations based on patient's age, sex and/or medical conditions. Orders for these recommended tests are listed in the plan section. The patient has been provided with a written plan.    Health Maintenance   Topic Date Due   • MAMMOGRAM  05/27/2016   • COLONOSCOPY  05/27/2016   • ZOSTER VACCINE  05/27/2016   • PNEUMOCOCCAL VACCINES (65+ LOW/MEDIUM RISK) (1 of 2 - PCV13) 07/04/2016   • LIPID PANEL  10/07/2016   • INFLUENZA VACCINE  08/01/2017   • TDAP/TD VACCINES (2 - Td) 03/30/2020        Subjective   History of Present Illness    Tenisha Grayson is a 67 y.o. female who presents for an Annual Wellness Visit.    The following portions of the patient's history were reviewed and updated as appropriate: allergies.    Outpatient Medications Prior to Visit   Medication Sig Dispense Refill   • acyclovir (ZOVIRAX) 400 MG tablet Take 1 tablet by mouth 3 (Three) Times a Day. Take no more than 5 doses a day.  30 tablet 0   • ARMOUR THYROID PO Take 1 tablet by mouth daily. T4 95 cg and T3 27 mc compounded     • aspirin 325 MG tablet Take  by mouth.     • azithromycin (ZITHROMAX Z-MELLISSA) 250 MG tablet Take 2 tablets the first day, then 1 tablet daily for 4 days. 6 tablet 0   • Cholecalciferol (VITAMIN D3) 5000 UNITS tablet Take 1 tablet by mouth Daily. 30 tablet    • levoFLOXacin (LEVAQUIN) 500 MG tablet Take 1 tablet by mouth Daily. 7 tablet 0   • losartan-hydrochlorothiazide (HYZAAR) 100-25 MG per tablet Take 0.5 tablets by mouth Every Morning. 15 tablet 0   • MULTIPLE VITAMIN PO Take  by mouth daily.       No facility-administered medications prior to visit.        Patient Active Problem List   Diagnosis   • COPD, moderate   • HLD (hyperlipidemia)   • Adult hypothyroidism   • Adnexal mass   • Compulsive tobacco user syndrome   • Avitaminosis D   • Essential hypertension       Advance Care Planning:  has an advance directive - a copy HAS NOT been provided. Have asked the patient to send this to us to add to record.    Identification of Risk Factors:  Risk factors include: tobacco use and alcohol use.    Review of Systems    Compared to one year ago, the patient feels her physical health is better.  Compared to one year ago, the patient feels her mental health is better.    Objective     Physical Exam    Vitals:    07/18/17 1127   BP: 140/90   BP Location: Right arm   Patient Position: Sitting   Pulse: 76   Resp: 16   Temp: 99.2 °F (37.3 °C)   TempSrc: Oral   SpO2: 94%   Weight: 137 lb (62.1 kg)       Body mass index is 26.76 kg/(m^2).  Discussed the patient's BMI with her. The BMI is above average; no BMI management plan is appropriate..    Assessment/Plan   Patient Self-Management and Personalized Health Advice  The patient has been provided with information about: diet, exercise, tobacco cessation and alcohol use and preventive services including:   · Glaucoma screening recommended, Influenza vaccine, Pneumococcal  exhibits no frontal sinus tenderness. Left sinus exhibits no frontal sinus tenderness. Mouth/Throat: Mucous membranes are normal.   Cardiovascular:  Normal rate.           Pulmonary/Chest: Effort normal. No respiratory distress. He has no decreased breath sounds.   Musculoskeletal:         General: Normal range of motion.     Lymphadenopathy:        Head (right side): No submental adenopathy present.        Head (left side): No submental adenopathy present.   Neurological: He is alert and oriented to person, place, and time. He has normal strength. GCS score is 15. GCS eye subscore is 4. GCS verbal subscore is 5. GCS motor subscore is 6.   Skin: Skin is warm.   Psychiatric: He has a normal mood and affect. His behavior is normal. Judgment and thought content normal.         ED Course   Foreign Body    Date/Time: 4/27/2025 8:07 PM    Performed by: Kendra Tang PA  Authorized by: Juni Landis IV, MD  Consent Done: Yes  Consent: Verbal consent obtained. Written consent not obtained  Risks and benefits: risks, benefits and alternatives were discussed  Consent given by: patient  Patient understanding: patient states understanding of the procedure being performed  Body area: ear    Patient sedated: no  Patient restrained: no  Patient cooperative: yes  Localization method: visualized  Removal mechanism: forceps and irrigation  Complexity: simple  1 objects recovered.  Objects recovered: 1  Post-procedure assessment: foreign body removed  Patient tolerance: Patient tolerated the procedure well with no immediate complications  Comments: Q tip removed intact left ear canal.      Labs Reviewed - No data to display       Imaging Results    None          Medications - No data to display  Medical Decision Making  22yoAAM w/right ear pain x 5days. Also having left ear pain. Tried to clean with otc drops and did not improve and now worsening. Denies nausea,vomiting, abdominal pain, fever, chest pain, chills, or shortness  of breath.       Problems Addressed:  Foreign body of left ear, initial encounter: acute illness or injury  Otalgia of left ear: acute illness or injury     Details: Differential diagnosis included but not limited to:  Likely otitis externa.  Patient also had foreign body.  Foreign body removed.  Otalgia of right ear: acute illness or injury     Details: Differential diagnosis included but not limited to:  Otitis externa, otitis media, foreign body, other etiologies     Likely otitis media with a component of otitis externa with tragal tenderness. All questions asked and answered at the time of the visit. Strict ER precautions given. Patient and family members agreeable to plan.         Risk  Prescription drug management.                                      Clinical Impression:  Final diagnoses:  [H92.01] Right ear pain  [H92.02] Otalgia of left ear  [H92.01] Otalgia of right ear  [T16.2XXA] Foreign body of left ear, initial encounter  [H66.90] Otitis media, unspecified laterality, unspecified otitis media type (Primary)          ED Disposition Condition    Discharge Stable          ED Prescriptions       Medication Sig Dispense Start Date End Date Auth. Provider    ciprofloxacin-dexAMETHasone 0.3-0.1% (CIPRODEX) 0.3-0.1 % DrpS  (Status: Discontinued) Place 4 drops into both ears 2 (two) times daily. 7.5 mL 4/27/2025 4/27/2025 Kendra Tang PA    amoxicillin-clavulanate 875-125mg (AUGMENTIN) 875-125 mg per tablet  (Status: Discontinued) Take 1 tablet by mouth 2 (two) times daily. for 10 days 20 tablet 4/27/2025 4/27/2025 Kendra Tang PA    ciprofloxacin-dexAMETHasone 0.3-0.1% (CIPRODEX) 0.3-0.1 % DrpS Place 4 drops into both ears 2 (two) times daily. 7.5 mL 4/27/2025 -- Kendra Tang PA    amoxicillin-clavulanate 875-125mg (AUGMENTIN) 875-125 mg per tablet Take 1 tablet by mouth 2 (two) times daily. for 10 days 20 tablet 4/27/2025 5/7/2025 Kendra Tang PA          Follow-up Information  vaccine , Zostavax vaccine (Herpes Zoster).    Visit Diagnoses:  No diagnosis found.    No orders of the defined types were placed in this encounter.      Outpatient Encounter Prescriptions as of 7/18/2017   Medication Sig Dispense Refill   • acyclovir (ZOVIRAX) 400 MG tablet Take 1 tablet by mouth 3 (Three) Times a Day. Take no more than 5 doses a day. 30 tablet 0   • ARMOUR THYROID PO Take 1 tablet by mouth daily. T4 95 cg and T3 27 mc compounded     • aspirin 325 MG tablet Take  by mouth.     • azithromycin (ZITHROMAX Z-MELLISSA) 250 MG tablet Take 2 tablets the first day, then 1 tablet daily for 4 days. 6 tablet 0   • Cholecalciferol (VITAMIN D3) 5000 UNITS tablet Take 1 tablet by mouth Daily. 30 tablet    • levoFLOXacin (LEVAQUIN) 500 MG tablet Take 1 tablet by mouth Daily. 7 tablet 0   • losartan-hydrochlorothiazide (HYZAAR) 100-25 MG per tablet Take 0.5 tablets by mouth Every Morning. 15 tablet 0   • MULTIPLE VITAMIN PO Take  by mouth daily.       No facility-administered encounter medications on file as of 7/18/2017.        Reviewed use of high risk medication in the elderly: yes  Reviewed for potential of harmful drug interactions in the elderly: yes    Follow Up:  No Follow-up on file.     An After Visit Summary and PPPS with all of these plans were given to the patient.     The wellness exam has been reviewed in detail.  The patient has been fully counseled on preventative guidelines for vaccines, cancer screenings, and other health maintenance needs.  Functional testing has been performed to assess capacity for independent living and need for other medical interventions.   The patient was counseled on maintaining a lifestyle to promote good health and to minimize chronic diseases.  The patient has been assisted with scheduling healthcare procedures for the coming year and given a written document outlining these recommendations.    Electronically signed Abhishek Spence M.D.7/21/2017 4:24 PM                 Follow up With Specialties Details Why Contact Info    [facility] - Emergency Dept Emergency Medicine  As needed, If symptoms worsen [address] [facility] [id number] [phone]        This note was typed partially using voice recognition software.  Please be reminded that not all corrections/addendums to grammar may have been made prior to closing of this chart.         [1]         Kendra Tang PA  04/27/25 2011

## 2025-05-12 ENCOUNTER — OFFICE VISIT (OUTPATIENT)
Dept: ORTHOPEDIC SURGERY | Facility: CLINIC | Age: 75
End: 2025-05-12
Payer: MEDICARE

## 2025-05-12 VITALS
WEIGHT: 155.8 LBS | HEIGHT: 61 IN | SYSTOLIC BLOOD PRESSURE: 144 MMHG | DIASTOLIC BLOOD PRESSURE: 86 MMHG | BODY MASS INDEX: 29.42 KG/M2

## 2025-05-12 DIAGNOSIS — M16.11 PRIMARY OSTEOARTHRITIS OF RIGHT HIP: Primary | ICD-10-CM

## 2025-05-12 PROCEDURE — 99204 OFFICE O/P NEW MOD 45 MIN: CPT | Performed by: ORTHOPAEDIC SURGERY

## 2025-05-12 PROCEDURE — 3079F DIAST BP 80-89 MM HG: CPT | Performed by: ORTHOPAEDIC SURGERY

## 2025-05-12 PROCEDURE — 3077F SYST BP >= 140 MM HG: CPT | Performed by: ORTHOPAEDIC SURGERY

## 2025-05-12 PROCEDURE — 1160F RVW MEDS BY RX/DR IN RCRD: CPT | Performed by: ORTHOPAEDIC SURGERY

## 2025-05-12 PROCEDURE — 1159F MED LIST DOCD IN RCRD: CPT | Performed by: ORTHOPAEDIC SURGERY

## 2025-05-12 NOTE — PROGRESS NOTES
[facility] Note    Subjective     Chief Complaint   Patient presents with    Right Hip - Pain        HPI    Tenisha Grayson is a 74 y.o. female who presents with new problem of: right hip pain.  Onset: atraumatic and gradual in nature. The issue has been ongoing for 2 year(s). Pain is a 6/10 on the pain scale. Pain is described as dull, aching, and throbbing. Associated symptoms include pain. The pain is worse with climbing stairs and getting in a car ; pain medication and/or NSAID improve the pain. Previous treatments have included: NSAIDS and physical therapy.  Pain is located in the groin region.  No previous intervention for her hip.    I have reviewed the following portions of the patient's history and agree with: History of Present Illness and Review of Systems    Patient Active Problem List   Diagnosis    COPD, moderate    Adnexal mass    Tobacco use    Vitamin D deficiency    Menopausal and postmenopausal disorder    Acquired hypothyroidism    Benign essential hypertension    Other fatigue    Left bundle branch block (LBBB) determined by electrocardiography    Stress and adjustment reaction    Screening for colon cancer    Mixed hyperlipidemia    Osteopenia of multiple sites    Overweight with body mass index (BMI) of 27 to 27.9 in adult    Diverticulosis    Chronic constipation    Medicare annual wellness visit, subsequent    Annual physical exam    Screening mammogram, encounter for    Arthritis of carpometacarpal (CMC) joint of both thumbs    Rash    Herpes zoster without complication    Persistent proteinuria     Past Medical History:   Diagnosis Date    Abnormal EKG     Atrophic vaginitis     COPD (chronic obstructive pulmonary disease) 1995    Fen-phen exposure 1996    negative workup    Fibroids 1991    MANJULA and BSO    Hyperlipidemia     Hypertension     Hypothyroidism 2000    Obesity, mild 1990    Rectocele     Skin cancer of face     Thyroid disease     Vitamin D deficiency  disease       Past Surgical History:   Procedure Laterality Date    CARDIAC CATHETERIZATION Left 2014    Normal coronaries    COLONOSCOPY  2008    Normal study    OTHER SURGICAL HISTORY  2013    Rectocele repair    TOTAL ABDOMINAL HYSTERECTOMY WITH SALPINGO OOPHORECTOMY  1991    For fibroids and bleeding      Family History   Problem Relation Age of Onset    COPD Mother     Other Mother         Polio    Osteoporosis Mother     Heart attack Father         Cause of death, Non smoker    Hypertension Father     Breast cancer Sister 60    Alcohol abuse Brother     Suicidality Brother     Thrombophlebitis Brother     No Known Problems Brother     Breast cancer Daughter 28    Breast cancer Maternal Aunt     Unexplained death Other     Hypertension Other     Coronary artery disease Other     Ovarian cancer Neg Hx      Social History     Socioeconomic History    Marital status:    Tobacco Use    Smoking status: Some Days     Current packs/day: 1.00     Types: Cigarettes    Smokeless tobacco: Never    Tobacco comments:     Stopped 8/7/17   Vaping Use    Vaping status: Never Used   Substance and Sexual Activity    Alcohol use: Yes    Drug use: No    Sexual activity: Defer      Current Outpatient Medications on File Prior to Visit   Medication Sig Dispense Refill    aspirin 325 MG tablet Take 1 tablet by mouth Every Other Day.      Calcium-Magnesium-Zinc 333-133-5 MG tablet Take 1 tablet by mouth Every 30 (Thirty) Days.      Coenzyme Q10 400 MG capsule Take 1 capsule by mouth Every Night. 90 capsule 3    Hormone Cream Base cream Apply 2 clicks (0.5mL) to inner arm or thigh each day  Estradiol/Estriol/Testosterone Versabase CR 0.2/0.8/1mg/0.5mL 15 g 5    levothyroxine (SYNTHROID, LEVOTHROID) 112 MCG tablet Take 1 tablet by mouth Daily. 90 tablet 3    liothyronine (CYTOMEL) 5 MCG tablet Take 2 tablets by mouth Daily. 180 tablet 3    losartan (COZAAR) 50 MG tablet TAKE 1 TABLET BY MOUTH DAILY. 90 tablet 1    MULTIPLE  VITAMIN PO Take 1 tablet by mouth Daily.      polyethylene glycol (MIRALAX) 17 g packet Take 17 g by mouth Daily.      progesterone (PROMETRIUM) 200 MG capsule Take 2 capsules by mouth 1-2 hours before bedtime 180 capsule 1    Progesterone 200 MG suppository Take 2 capsules by mouth 1-2 hrs prior to sleep      rosuvastatin (CRESTOR) 5 MG tablet TAKE 1 TABLET BY MOUTH EVERY NIGHT. 90 tablet 1    vitamin D3 125 MCG (5000 UT) capsule capsule TAKE 1 CAPSULE BY MOUTH DAILY. 90 capsule 1    [DISCONTINUED] phentermine (ADIPEX-P) 37.5 MG tablet TAKE 1 TABLET BY MOUTH DAILY. 30 tablet 0    [DISCONTINUED] Tirzepatide (MOUNJARO) 7.5 MG/0.5ML solution pen-injector pen Inject 0.5 mL under the skin into the appropriate area as directed 1 (One) Time Per Week.       No current facility-administered medications on file prior to visit.      Allergies   Allergen Reactions    Bupropion      Drowsiness        Review of Systems   Constitutional:  Negative for activity change, appetite change, chills, diaphoresis, fatigue, fever and unexpected weight change.   HENT:  Negative for congestion, dental problem, drooling, ear discharge, ear pain, facial swelling, hearing loss, mouth sores, nosebleeds, postnasal drip, rhinorrhea, sinus pressure, sneezing, sore throat, tinnitus, trouble swallowing and voice change.    Eyes:  Negative for photophobia, pain, discharge, redness, itching and visual disturbance.   Respiratory:  Negative for apnea, cough, choking, chest tightness, shortness of breath, wheezing and stridor.    Cardiovascular:  Negative for chest pain, palpitations and leg swelling.   Gastrointestinal:  Negative for abdominal distention, abdominal pain, anal bleeding, blood in stool, constipation, diarrhea, nausea, rectal pain and vomiting.   Endocrine: Negative for cold intolerance, heat intolerance, polydipsia, polyphagia and polyuria.   Genitourinary:  Negative for decreased urine volume, difficulty urinating, dysuria, enuresis,  "flank pain, frequency, genital sores, hematuria and urgency.   Musculoskeletal:  Positive for arthralgias. Negative for back pain, gait problem, joint swelling, myalgias, neck pain and neck stiffness.   Skin:  Negative for color change, pallor, rash and wound.   Allergic/Immunologic: Negative for environmental allergies, food allergies and immunocompromised state.   Neurological:  Negative for dizziness, tremors, seizures, syncope, facial asymmetry, speech difficulty, weakness, light-headedness, numbness and headaches.   Hematological:  Negative for adenopathy. Does not bruise/bleed easily.   Psychiatric/Behavioral:  Negative for agitation, behavioral problems, confusion, decreased concentration, dysphoric mood, hallucinations, self-injury, sleep disturbance and suicidal ideas. The patient is not nervous/anxious and is not hyperactive.         Objective      Physical Exam  /86   Ht 153.7 cm (60.5\")   Wt 70.7 kg (155 lb 12.8 oz)   BMI 29.93 kg/m²     Body mass index is 29.93 kg/m².           General:   Mental Status:  Alert   Appearance: Cooperative, in no acute distress   Build and Nutrition: Well-nourished well-developed female   Orientation: Alert and oriented to person, place and time   Posture: Normal   Gait: Limp on the right    Integument:   Right hip: No skin lesions, no rash, no ecchymosis    Neurologic:   Motor:  Right lower extremity: 5/5 quadriceps, hamstrings, ankle dorsiflexors, and ankle plantar flexors    Lower Extremities:   Right Hip:    Tenderness:  None    Swelling: None    Crepitus:  None    Atrophy:  None    Range of motion:  External Rotation: 30°       Internal Rotation: 20°, with pain       Flexion:  100°       Extension:  0°   Instability:  None  Deformities:  None  Functional testing: [facility]    No leg length discrepancy      Imaging/Studies      Imaging Results (Last 24 Hours)       Procedure Component Value Units Date/Time    XR Hip With or Without Pelvis 2 - 3 View " Right [[id number]] Resulted: 05/12/25 1348     Updated: 05/12/25 1348    Narrative:      Right Hip Radiographs  Indication: right hip pain  Views: low AP pelvis and lateral of the right hip    Comparison: no prior studies available for review    Findings:   Osteophytes at the head neck junction, inferior acetabular osteophyte,   lateral acetabular osteophyte, no acute bony abnormalities.  No unusual   bony features.  Right hip arthritis.              Assessment and Plan     Diagnoses and all orders for this visit:    1. Primary osteoarthritis of right hip (Primary)  -     XR Hip With or Without Pelvis 2 - 3 View Right  -     Ambulatory Referral to Physical Therapy for Evaluation & Treatment        1. Primary osteoarthritis of right hip          I reviewed my findings with the patient.  She has right hip arthritis, we discussed options today.  She would like to proceed with a trial of an intra-articular hip injection first before consideration of hip replacement surgery.  Ultimately I think she is a good candidate for hip replacement surgery.  I will see her back about 4 weeks after the injection, but I will be happy to see her back sooner for any problems.  We will refer her to Dr. Garcia for consideration of an ultrasound-guided injection into her right hip.  She would also like to try course of physical therapy, and a referral was provided today.    Return in about 4 weeks (around 6/9/2025) for after hip injection.      Mkio Drummond MD  05/12/25  14:13 EDT      Dictated Utilizing Dragon Dictation

## 2025-05-14 ENCOUNTER — TELEPHONE (OUTPATIENT)
Dept: ORTHOPEDIC SURGERY | Facility: CLINIC | Age: 75
End: 2025-05-14
Payer: MEDICARE

## 2025-05-14 ENCOUNTER — TELEPHONE (OUTPATIENT)
Dept: INTERNAL MEDICINE | Facility: CLINIC | Age: 75
End: 2025-05-14
Payer: MEDICARE

## 2025-05-14 NOTE — TELEPHONE ENCOUNTER
Caller: Tenisha Grayson    Relationship: Self    Best call back number: [phone]     What medication are you requesting: PHENTERMINE    What are your current symptoms: OVER WEIGHT    How long have you been experiencing symptoms:     Have you had these symptoms before:    [] Yes  [x] No    Have you been treated for these symptoms before:   [] Yes  [x] No    If a prescription is needed, what is your preferred pharmacy and phone number: [id number] [address] [facility] [address] [facility] [phone] [facility] [phone] FX     Additional notes: PATIENT STATES SHE WAS ON THIS MEDICATION SOME TIME LAST YEAR AND WENT ON THE WEIGHT LOSS INJECTIONS INSTEAD. SHE IS NOT LONGER ON THE INJECTIONS. SHE ONLY TOOK THEM UNTIL JULY 2024. SHE WOULD NOW LIKE TO GO BACK ON THIS MEDICATION AND WOULD LIKE TO SEE IF DR. JONES WOULD CALL THIS IN FOR HER. I DID ADVISE HER THAT SHE WOULD NEED AN APPOINTMENT TO GET THIS MEDICATION DUE TO NEEDING AN UP TO DATE DRUG SCREEN AND CSA. I ALSO SCHEDULED HER AN APPOINTMENT TO HAVE THE URINE SCREEN, BUT SHE INSISTED I ALSO SEND THIS MESSAGE.

## 2025-05-14 NOTE — TELEPHONE ENCOUNTER
Caller: Tenisha Grayson    Relationship to patient: Self    Best call back number: [phone] (home)       Chief complaint: RIGHT HIP      Type of visit: INJECTION       Additional notes:NEEDS TO CANCEL WILL CALL BACK WHEN READY TO RESCHEDULE

## 2025-05-14 NOTE — TELEPHONE ENCOUNTER
Spoke with Dr. Canseco about this and she is going to wait until she sees the pt on 5/30. LM advising pt that they can talk about this at her appt.

## 2025-05-16 ENCOUNTER — TELEPHONE (OUTPATIENT)
Dept: ORTHOPEDIC SURGERY | Facility: CLINIC | Age: 75
End: 2025-05-16

## 2025-05-16 NOTE — TELEPHONE ENCOUNTER
Provider: DR. CASTRO    Caller: Tenisha Grayson    Relationship to Patient: Self    Phone Number: [phone]    Reason for Call: PATIENT CALLED STATING SHE HAS MADE UP HER MIND AND WANTED TO MOVE FORWARD WITH A HIP REPLACEMENT. SHE IS REQUESTING A CALL BACK. PLEASE ADVISE.

## 2025-06-23 ENCOUNTER — OFFICE VISIT (OUTPATIENT)
Dept: ORTHOPEDIC SURGERY | Facility: CLINIC | Age: 75
End: 2025-06-23
Payer: MEDICARE

## 2025-06-23 VITALS
SYSTOLIC BLOOD PRESSURE: 132 MMHG | WEIGHT: 145 LBS | BODY MASS INDEX: 27.38 KG/M2 | DIASTOLIC BLOOD PRESSURE: 78 MMHG | HEIGHT: 61 IN

## 2025-06-23 DIAGNOSIS — M16.11 PRIMARY OSTEOARTHRITIS OF RIGHT HIP: Primary | ICD-10-CM

## 2025-06-23 PROCEDURE — 1159F MED LIST DOCD IN RCRD: CPT | Performed by: ORTHOPAEDIC SURGERY

## 2025-06-23 PROCEDURE — 1160F RVW MEDS BY RX/DR IN RCRD: CPT | Performed by: ORTHOPAEDIC SURGERY

## 2025-06-23 PROCEDURE — 3078F DIAST BP <80 MM HG: CPT | Performed by: ORTHOPAEDIC SURGERY

## 2025-06-23 PROCEDURE — 3075F SYST BP GE 130 - 139MM HG: CPT | Performed by: ORTHOPAEDIC SURGERY

## 2025-06-23 PROCEDURE — 99212 OFFICE O/P EST SF 10 MIN: CPT | Performed by: ORTHOPAEDIC SURGERY

## 2025-06-23 RX ORDER — ROSUVASTATIN CALCIUM 5 MG/1
5 TABLET, COATED ORAL
Qty: 90 TABLET | Refills: 1 | Status: SHIPPED | OUTPATIENT
Start: 2025-06-23

## 2025-06-23 NOTE — PROGRESS NOTES
[facility]    Subjective     Chief Complaint   Patient presents with    Follow-up     6 week follow up - Primary osteoarthritis of right hip        HPI    It has been 6  week(s) since Ms. Grayson's last visit. She returns to clinic today for follow-up of right hip arthritis. The issue has been ongoing for 2 year(s). She rates her pain a 5/10 on the pain scale. Previous/current treatments: NSAIDS and physical therapy. Current symptoms: pain. The pain is worse with climbing stairs, leisure, and rising from seated position; resting, sitting, and pain medication and/or NSAID improve the pain. Overall, she is doing better.  Physical therapy has helped.  She is reconsidering the hip injection, and would like to have that scheduled.  She has also not keen on hip replacement surgery at this time.      I have reviewed the following portions of the patient's history and agree with: History of Present Illness and Review of Systems    Patient Active Problem List   Diagnosis    COPD, moderate    Adnexal mass    Tobacco use    Vitamin D deficiency    Menopausal and postmenopausal disorder    Acquired hypothyroidism    Benign essential hypertension    Other fatigue    Left bundle branch block (LBBB) determined by electrocardiography    Stress and adjustment reaction    Screening for colon cancer    Mixed hyperlipidemia    Osteopenia of multiple sites    Overweight with body mass index (BMI) of 27 to 27.9 in adult    Diverticulosis    Chronic constipation    Medicare annual wellness visit, subsequent    Annual physical exam    Screening mammogram, encounter for    Arthritis of carpometacarpal (CMC) joint of both thumbs    Rash    Herpes zoster without complication    Persistent proteinuria     Past Medical History:   Diagnosis Date    Abnormal EKG     Atrophic vaginitis     COPD (chronic obstructive pulmonary disease) 1995    Fen-phen exposure 1996    negative workup    Fibroids 1991    MANJULA and BSO     Hyperlipidemia     Hypertension     Hypothyroidism 2000    Obesity, mild 1990    Rectocele     Skin cancer of face     Thyroid disease     Vitamin D deficiency disease       Past Surgical History:   Procedure Laterality Date    CARDIAC CATHETERIZATION Left 2014    Normal coronaries    COLONOSCOPY  2008    Normal study    OTHER SURGICAL HISTORY  2013    Rectocele repair    TOTAL ABDOMINAL HYSTERECTOMY WITH SALPINGO OOPHORECTOMY  1991    For fibroids and bleeding      Family History   Problem Relation Age of Onset    COPD Mother     Other Mother         Polio    Osteoporosis Mother     Heart attack Father         Cause of death, Non smoker    Hypertension Father     Breast cancer Sister 60    Alcohol abuse Brother     Suicidality Brother     Thrombophlebitis Brother     No Known Problems Brother     Breast cancer Daughter 28    Breast cancer Maternal Aunt     Unexplained death Other     Hypertension Other     Coronary artery disease Other     Ovarian cancer Neg Hx      Social History     Socioeconomic History    Marital status:    Tobacco Use    Smoking status: Some Days     Current packs/day: 1.00     Types: Cigarettes    Smokeless tobacco: Never    Tobacco comments:     Stopped 8/7/17   Vaping Use    Vaping status: Never Used   Substance and Sexual Activity    Alcohol use: Yes    Drug use: No    Sexual activity: Defer      Current Outpatient Medications on File Prior to Visit   Medication Sig Dispense Refill    aspirin 325 MG tablet Take 1 tablet by mouth Every Other Day.      Calcium-Magnesium-Zinc 333-133-5 MG tablet Take 1 tablet by mouth Every 30 (Thirty) Days.      Coenzyme Q10 400 MG capsule Take 1 capsule by mouth Every Night. 90 capsule 3    Diclofenac Sodium (VOLTAREN) 1 % gel gel APPLY TWO GRAMS TWICE A DAY TO AFFECTED AREAS FOR 60 DAYS.      Hormone Cream Base cream Apply 2 clicks (0.5mL) to inner arm or thigh each day  Estradiol/Estriol/Testosterone Versabase CR 0.2/0.8/1mg/0.5mL 15 g 5     levothyroxine (SYNTHROID, LEVOTHROID) 112 MCG tablet Take 1 tablet by mouth Daily. 90 tablet 3    liothyronine (CYTOMEL) 5 MCG tablet Take 2 tablets by mouth Daily. 180 tablet 3    losartan (COZAAR) 50 MG tablet TAKE 1 TABLET BY MOUTH DAILY. 90 tablet 1    MULTIPLE VITAMIN PO Take 1 tablet by mouth Daily.      polyethylene glycol (MIRALAX) 17 g packet Take 17 g by mouth Daily.      progesterone (PROMETRIUM) 200 MG capsule Take 2 capsules by mouth 1-2 hours before bedtime 180 capsule 1    Progesterone 200 MG suppository Take 2 capsules by mouth 1-2 hrs prior to sleep      vitamin D3 125 MCG (5000 UT) capsule capsule TAKE 1 CAPSULE BY MOUTH DAILY. 90 capsule 1    [DISCONTINUED] rosuvastatin (CRESTOR) 5 MG tablet TAKE 1 TABLET BY MOUTH EVERY NIGHT. 90 tablet 1     No current facility-administered medications on file prior to visit.      Allergies   Allergen Reactions    Bupropion      Drowsiness        Review of Systems   Constitutional:  Negative for activity change, appetite change, chills, diaphoresis, fatigue, fever and unexpected weight change.   HENT:  Negative for congestion, dental problem, drooling, ear discharge, ear pain, facial swelling, hearing loss, mouth sores, nosebleeds, postnasal drip, rhinorrhea, sinus pressure, sneezing, sore throat, tinnitus, trouble swallowing and voice change.    Eyes:  Negative for photophobia, pain, discharge, redness, itching and visual disturbance.   Respiratory:  Negative for apnea, cough, choking, chest tightness, shortness of breath, wheezing and stridor.    Cardiovascular:  Negative for chest pain, palpitations and leg swelling.   Gastrointestinal:  Negative for abdominal distention, abdominal pain, anal bleeding, blood in stool, constipation, diarrhea, nausea, rectal pain and vomiting.   Endocrine: Negative for cold intolerance, heat intolerance, polydipsia, polyphagia and polyuria.   Genitourinary:  Negative for decreased urine volume, difficulty urinating, dysuria,  "enuresis, flank pain, frequency, genital sores, hematuria and urgency.   Musculoskeletal:  Positive for arthralgias. Negative for back pain, gait problem, joint swelling, myalgias, neck pain and neck stiffness.   Skin:  Negative for color change, pallor, rash and wound.   Allergic/Immunologic: Negative for environmental allergies, food allergies and immunocompromised state.   Neurological:  Negative for dizziness, tremors, seizures, syncope, facial asymmetry, speech difficulty, weakness, light-headedness, numbness and headaches.   Hematological:  Negative for adenopathy. Does not bruise/bleed easily.   Psychiatric/Behavioral:  Negative for agitation, behavioral problems, confusion, decreased concentration, dysphoric mood, hallucinations, self-injury, sleep disturbance and suicidal ideas. The patient is not nervous/anxious and is not hyperactive.         Objective      Physical Exam  /78   Ht 153.7 cm (60.51\")   Wt 65.8 kg (145 lb)   BMI 27.84 kg/m²     Body mass index is 27.84 kg/m².         General:   Mental Status:  Alert   Appearance: Cooperative, in no acute distress   Build and Nutrition: Well-nourished well-developed female   Orientation: Alert and oriented to person, place and time   Posture: Normal   Gait: Mild limp on the right    Integument:   Right hip: No skin lesions, no rash, no ecchymosis    Lower Extremity:   Right Hip:    Tenderness:  None    Swelling:  None    Crepitus:  None    Range of motion:  External Rotation: 30°       Internal Rotation: 20°, with pain       Flexion:  100°       Extension:  0°    Deformities:  None  Functional testing: Positive [facility]    No leg length discrepancy      Imaging/Studies  Imaging Results (Last 24 Hours)       ** No results found for the last 24 hours. **              Assessment and Plan     Diagnoses and all orders for this visit:    1. Primary osteoarthritis of right hip (Primary)        1. Primary osteoarthritis of right hip          I reviewed my " findings with the patient.  We discussed options for her right hip again today.  She decided to forego the intra-articular hip injection on her last visit, but now she would like to reconsider.  She has seen some improvement with physical therapy also.  She is not interested in surgical intervention at this time.  Will go ahead and have her see Dr. Garcia for consideration of an intra-articular hip injection under ultrasound guidance.  Will see her back about 4 weeks after the injection, but sooner for any problems.    Return in about 4 weeks (around 7/21/2025) for after hip injection.      Miko Drummond MD  06/23/25  15:16 EDT    Dictated Utilizing Dragon Dictation

## 2025-06-25 RX ORDER — LEVOTHYROXINE SODIUM 112 UG/1
112 TABLET ORAL DAILY
Qty: 90 TABLET | Refills: 3 | Status: SHIPPED | OUTPATIENT
Start: 2025-06-25

## 2025-06-25 RX ORDER — LIOTHYRONINE SODIUM 5 UG/1
10 TABLET ORAL DAILY
Qty: 180 TABLET | Refills: 3 | Status: SHIPPED | OUTPATIENT
Start: 2025-06-25

## 2025-06-25 RX ORDER — LOSARTAN POTASSIUM 50 MG/1
50 TABLET ORAL DAILY
Qty: 90 TABLET | Refills: 1 | Status: SHIPPED | OUTPATIENT
Start: 2025-06-25

## 2025-06-25 NOTE — TELEPHONE ENCOUNTER
Caller: Kenan Tenisha CRISTÓBAL    Relationship: Self    Best call back number: [phone]     Requested Prescriptions:   Requested Prescriptions     Pending Prescriptions Disp Refills    levothyroxine (SYNTHROID, LEVOTHROID) 112 MCG tablet 90 tablet 3     Sig: Take 1 tablet by mouth Daily.    liothyronine (CYTOMEL) 5 MCG tablet 180 tablet 3     Sig: Take 2 tablets by mouth Daily.    losartan (COZAAR) 50 MG tablet 90 tablet 1     Sig: Take 1 tablet by mouth Daily.    vitamin D3 125 MCG (5000 UT) capsule capsule 90 capsule 1     Sig: Take 1 capsule by mouth Daily.        Pharmacy where request should be sent: [id number] [id number] AT [facility] [address] [facility] [phone] [facility] [phone] FX     Last office visit with prescribing clinician: 8/28/2024   Last telemedicine visit with prescribing clinician: Visit date not found   Next office visit with prescribing clinician: 9/2/2025     Additional details provided by patient: PATIENT HAS CALLED REQUESTING REFILLS ON ABOVE MEDICATIONS.     Does the patient have less than a 3 day supply:  [] Yes  [x] No    Would you like a call back once the refill request has been completed: [] Yes [x] No    If the office needs to give you a call back, can they leave a voicemail: [] Yes [x] No    Samantha Steve   06/25/25 09:34 EDT

## 2025-06-26 ENCOUNTER — CLINICAL SUPPORT (OUTPATIENT)
Age: 75
End: 2025-06-26
Payer: MEDICARE

## 2025-06-26 DIAGNOSIS — M16.11 PRIMARY OSTEOARTHRITIS OF RIGHT HIP: Primary | ICD-10-CM

## 2025-06-26 RX ORDER — TRIAMCINOLONE ACETONIDE 40 MG/ML
80 INJECTION, SUSPENSION INTRA-ARTICULAR; INTRAMUSCULAR
Status: COMPLETED | OUTPATIENT
Start: 2025-06-26 | End: 2025-06-26

## 2025-06-26 RX ORDER — LIDOCAINE HYDROCHLORIDE 10 MG/ML
2 INJECTION, SOLUTION EPIDURAL; INFILTRATION; INTRACAUDAL; PERINEURAL
Status: COMPLETED | OUTPATIENT
Start: 2025-06-26 | End: 2025-06-26

## 2025-06-26 RX ORDER — BUPIVACAINE HYDROCHLORIDE 5 MG/ML
2 INJECTION, SOLUTION EPIDURAL; INTRACAUDAL; PERINEURAL
Status: COMPLETED | OUTPATIENT
Start: 2025-06-26 | End: 2025-06-26

## 2025-06-26 RX ADMIN — LIDOCAINE HYDROCHLORIDE 2 ML: 10 INJECTION, SOLUTION EPIDURAL; INFILTRATION; INTRACAUDAL; PERINEURAL at 11:45

## 2025-06-26 RX ADMIN — TRIAMCINOLONE ACETONIDE 80 MG: 40 INJECTION, SUSPENSION INTRA-ARTICULAR; INTRAMUSCULAR at 11:45

## 2025-06-26 RX ADMIN — BUPIVACAINE HYDROCHLORIDE 2 ML: 5 INJECTION, SOLUTION EPIDURAL; INTRACAUDAL; PERINEURAL at 11:45

## 2025-06-26 NOTE — PROGRESS NOTES
Procedure   - Large Joint Arthrocentesis: R hip joint on 6/26/2025 11:45 AM  Indications: pain  Details: 25 G (22 spinal ) needle, ultrasound-guided anterolateral approach  Medications: 2 mL lidocaine PF 1% 1 %; 80 mg triamcinolone acetonide 40 MG/ML; 2 mL bupivacaine (PF) 0.5 %  Outcome: tolerated well, no immediate complications  Procedure, treatment alternatives, risks and benefits explained, specific risks discussed. Consent was given by the patient. Immediately prior to procedure a time out was called to verify the correct patient, procedure, equipment, support staff and site/side marked as required. Patient was prepped and draped in the usual sterile fashion.        74-year-old female presents with right hip pain from right hip osteoarthritis.  Patient is a referral from Dr. Drummond for ultrasound-guided right hip joint corticosteroid injection.  Previous office documentation and images were personally reviewed prior to the visit.  We discussed them in detail how they correlate to experienced symptoms.  I explained the procedure in detail.  I answered all questions to the best my ability.  Risks and benefits as well as post procedure instructions were provided.  Patient elected to proceed and tolerated this procedure well.  See procedure note.  Follow-up with me will be on an as-needed basis.

## 2025-07-21 ENCOUNTER — OFFICE VISIT (OUTPATIENT)
Dept: ORTHOPEDIC SURGERY | Facility: CLINIC | Age: 75
End: 2025-07-21
Payer: MEDICARE

## 2025-07-21 VITALS
SYSTOLIC BLOOD PRESSURE: 134 MMHG | WEIGHT: 145 LBS | HEIGHT: 61 IN | BODY MASS INDEX: 27.38 KG/M2 | DIASTOLIC BLOOD PRESSURE: 80 MMHG

## 2025-07-21 DIAGNOSIS — M16.11 PRIMARY OSTEOARTHRITIS OF RIGHT HIP: Primary | ICD-10-CM

## 2025-07-21 PROCEDURE — 1159F MED LIST DOCD IN RCRD: CPT | Performed by: ORTHOPAEDIC SURGERY

## 2025-07-21 PROCEDURE — 3075F SYST BP GE 130 - 139MM HG: CPT | Performed by: ORTHOPAEDIC SURGERY

## 2025-07-21 PROCEDURE — 1160F RVW MEDS BY RX/DR IN RCRD: CPT | Performed by: ORTHOPAEDIC SURGERY

## 2025-07-21 PROCEDURE — 3079F DIAST BP 80-89 MM HG: CPT | Performed by: ORTHOPAEDIC SURGERY

## 2025-07-21 PROCEDURE — 99212 OFFICE O/P EST SF 10 MIN: CPT | Performed by: ORTHOPAEDIC SURGERY

## 2025-07-21 RX ORDER — HYDROCODONE BITARTRATE AND ACETAMINOPHEN 7.5; 325 MG/1; MG/1
TABLET ORAL AS NEEDED
COMMUNITY
Start: 2025-07-02 | End: 2025-07-21

## 2025-07-21 NOTE — PROGRESS NOTES
[facility]    Subjective     Chief Complaint   Patient presents with    Follow-up     1 month follow up - Primary osteoarthritis of right hip        HPI    It has been 1  month(s) since Ms. Grayson's last visit. She returns to clinic today for follow-up of right hip arthritis. The issue has been ongoing for 2 year(s). She rates her pain a 0/10 on the pain scale. Previous/current treatments: physical therapy and viscosupplementation (last injection 6/26/25). Current symptoms: discomfort. The pain is worse with leisure; does not need treatment to improve the pain. Overall, she is doing better.  Injection helped out significantly.      I have reviewed the following portions of the patient's history and agree with: History of Present Illness and Review of Systems    Patient Active Problem List   Diagnosis    COPD, moderate    Adnexal mass    Tobacco use    Vitamin D deficiency    Menopausal and postmenopausal disorder    Acquired hypothyroidism    Benign essential hypertension    Other fatigue    Left bundle branch block (LBBB) determined by electrocardiography    Stress and adjustment reaction    Screening for colon cancer    Mixed hyperlipidemia    Osteopenia of multiple sites    Overweight with body mass index (BMI) of 27 to 27.9 in adult    Diverticulosis    Chronic constipation    Medicare annual wellness visit, subsequent    Annual physical exam    Screening mammogram, encounter for    Arthritis of carpometacarpal (CMC) joint of both thumbs    Rash    Herpes zoster without complication    Persistent proteinuria     Past Medical History:   Diagnosis Date    Abnormal EKG     Atrophic vaginitis     COPD (chronic obstructive pulmonary disease) 1995    Fen-phen exposure 1996    negative workup    Fibroids 1991    MANJULA and BSO    Hyperlipidemia     Hypertension     Hypothyroidism 2000    Obesity, mild 1990    Rectocele     Skin cancer of face     Thyroid disease     Vitamin D deficiency  disease       Past Surgical History:   Procedure Laterality Date    CARDIAC CATHETERIZATION Left 2014    Normal coronaries    COLONOSCOPY  2008    Normal study    OTHER SURGICAL HISTORY  2013    Rectocele repair    TOTAL ABDOMINAL HYSTERECTOMY WITH SALPINGO OOPHORECTOMY  1991    For fibroids and bleeding      Family History   Problem Relation Age of Onset    COPD Mother     Other Mother         Polio    Osteoporosis Mother     Heart attack Father         Cause of death, Non smoker    Hypertension Father     Breast cancer Sister 60    Alcohol abuse Brother     Suicidality Brother     Thrombophlebitis Brother     No Known Problems Brother     Breast cancer Daughter 28    Breast cancer Maternal Aunt     Unexplained death Other     Hypertension Other     Coronary artery disease Other     Ovarian cancer Neg Hx      Social History     Socioeconomic History    Marital status:    Tobacco Use    Smoking status: Some Days     Current packs/day: 1.00     Types: Cigarettes    Smokeless tobacco: Never    Tobacco comments:     Stopped 8/7/17   Vaping Use    Vaping status: Never Used   Substance and Sexual Activity    Alcohol use: Yes    Drug use: No    Sexual activity: Defer      Current Outpatient Medications on File Prior to Visit   Medication Sig Dispense Refill    [DISCONTINUED] HYDROcodone-acetaminophen (NORCO) 7.5-325 MG per tablet Take  by mouth As Needed. Take 1 tablet by mouth every 4-6 hours as needed for pain      aspirin 325 MG tablet Take 1 tablet by mouth Every Other Day.      Calcium-Magnesium-Zinc 333-133-5 MG tablet Take 1 tablet by mouth Every 30 (Thirty) Days.      Coenzyme Q10 400 MG capsule Take 1 capsule by mouth Every Night. 90 capsule 3    Diclofenac Sodium (VOLTAREN) 1 % gel gel APPLY TWO GRAMS TWICE A DAY TO AFFECTED AREAS FOR 60 DAYS.      Hormone Cream Base cream Apply 2 clicks (0.5mL) to inner arm or thigh each day  Estradiol/Estriol/Testosterone Versabase CR 0.2/0.8/1mg/0.5mL 15 g 5     levothyroxine (SYNTHROID, LEVOTHROID) 112 MCG tablet Take 1 tablet by mouth Daily. 90 tablet 3    liothyronine (CYTOMEL) 5 MCG tablet Take 2 tablets by mouth Daily. 180 tablet 3    losartan (COZAAR) 50 MG tablet Take 1 tablet by mouth Daily. 90 tablet 1    MULTIPLE VITAMIN PO Take 1 tablet by mouth Daily.      polyethylene glycol (MIRALAX) 17 g packet Take 17 g by mouth Daily.      progesterone (PROMETRIUM) 200 MG capsule Take 2 capsules by mouth 1-2 hours before bedtime 180 capsule 1    Progesterone 200 MG suppository Take 2 capsules by mouth 1-2 hrs prior to sleep      rosuvastatin (CRESTOR) 5 MG tablet TAKE 1 TABLET BY MOUTH EVERY NIGHT AT BEDTIME 90 tablet 1    vitamin D3 125 MCG (5000 UT) capsule capsule Take 1 capsule by mouth Daily. 90 capsule 1     No current facility-administered medications on file prior to visit.      Allergies   Allergen Reactions    Bupropion      Drowsiness        Review of Systems   Constitutional:  Negative for activity change, appetite change, chills, diaphoresis, fatigue, fever and unexpected weight change.   HENT:  Negative for congestion, dental problem, drooling, ear discharge, ear pain, facial swelling, hearing loss, mouth sores, nosebleeds, postnasal drip, rhinorrhea, sinus pressure, sneezing, sore throat, tinnitus, trouble swallowing and voice change.    Eyes:  Negative for photophobia, pain, discharge, redness, itching and visual disturbance.   Respiratory:  Negative for apnea, cough, choking, chest tightness, shortness of breath, wheezing and stridor.    Cardiovascular:  Negative for chest pain, palpitations and leg swelling.   Gastrointestinal:  Negative for abdominal distention, abdominal pain, anal bleeding, blood in stool, constipation, diarrhea, nausea, rectal pain and vomiting.   Endocrine: Negative for cold intolerance, heat intolerance, polydipsia, polyphagia and polyuria.   Genitourinary:  Negative for decreased urine volume, difficulty urinating, dysuria,  "enuresis, flank pain, frequency, genital sores, hematuria and urgency.   Musculoskeletal:  Positive for arthralgias. Negative for back pain, gait problem, joint swelling, myalgias, neck pain and neck stiffness.   Skin:  Negative for color change, pallor, rash and wound.   Allergic/Immunologic: Negative for environmental allergies, food allergies and immunocompromised state.   Neurological:  Negative for dizziness, tremors, seizures, syncope, facial asymmetry, speech difficulty, weakness, light-headedness, numbness and headaches.   Hematological:  Negative for adenopathy. Does not bruise/bleed easily.   Psychiatric/Behavioral:  Negative for agitation, behavioral problems, confusion, decreased concentration, dysphoric mood, hallucinations, self-injury, sleep disturbance and suicidal ideas. The patient is not nervous/anxious and is not hyperactive.         Objective      Physical Exam  /80   Ht 153.7 cm (60.51\")   Wt 65.8 kg (145 lb)   BMI 27.84 kg/m²     Body mass index is 27.84 kg/m².         General:   Mental Status:  Alert   Appearance: Cooperative, in no acute distress   Build and Nutrition: Well-nourished well-developed female   Orientation: Alert and oriented to person, place and time   Posture: Normal   Gait: Nonantalgic    Integument:   Right hip: No skin lesions, no rash, no ecchymosis    Lower Extremity:   Right Hip:    Tenderness:  None    Swelling:  None    Crepitus:  None    Range of motion:  External Rotation: 30°       Internal Rotation: 15°       Flexion:  100°       Extension:  0°    Deformities:  None  Functional testing: Mildly positive [facility]    No leg length discrepancy      Imaging/Studies  Imaging Results (Last 24 Hours)       ** No results found for the last 24 hours. **          No new imaging today.    Assessment and Plan     Diagnoses and all orders for this visit:    1. Primary osteoarthritis of right hip (Primary)        1. Primary osteoarthritis of right hip          I " reviewed my findings with the patient.  She responded well to her intra-articular hip injection and I will see her back in 6 months for checkup, but I will be happy to see her back sooner for any worsening or problems.    Return in about 6 months (around 1/21/2026).      Mkio Drummond MD  07/21/25  12:46 EDT    Dictated Utilizing Dragon Dictation